# Patient Record
Sex: MALE | Race: WHITE | NOT HISPANIC OR LATINO | Employment: OTHER | URBAN - METROPOLITAN AREA
[De-identification: names, ages, dates, MRNs, and addresses within clinical notes are randomized per-mention and may not be internally consistent; named-entity substitution may affect disease eponyms.]

---

## 2018-04-12 ENCOUNTER — OFFICE VISIT (OUTPATIENT)
Dept: VASCULAR SURGERY | Facility: CLINIC | Age: 83
End: 2018-04-12
Payer: COMMERCIAL

## 2018-04-12 VITALS
TEMPERATURE: 98 F | WEIGHT: 250 LBS | SYSTOLIC BLOOD PRESSURE: 130 MMHG | BODY MASS INDEX: 39.24 KG/M2 | HEIGHT: 67 IN | DIASTOLIC BLOOD PRESSURE: 86 MMHG

## 2018-04-12 DIAGNOSIS — I73.9 PERIPHERAL ARTERIAL DISEASE (HCC): Primary | ICD-10-CM

## 2018-04-12 PROBLEM — G62.9 PERIPHERAL NEUROPATHY: Status: ACTIVE | Noted: 2018-04-12

## 2018-04-12 PROCEDURE — 99213 OFFICE O/P EST LOW 20 MIN: CPT | Performed by: SURGERY

## 2018-04-12 RX ORDER — CILOSTAZOL 100 MG/1
TABLET ORAL
COMMUNITY
Start: 2018-03-12 | End: 2018-06-17 | Stop reason: SDUPTHER

## 2018-04-12 RX ORDER — CHLORAL HYDRATE 500 MG
CAPSULE ORAL
COMMUNITY

## 2018-04-12 RX ORDER — METOPROLOL SUCCINATE 50 MG/1
TABLET, EXTENDED RELEASE ORAL
COMMUNITY

## 2018-04-12 RX ORDER — MULTIVIT-MIN/IRON/FOLIC ACID/K 18-600-40
CAPSULE ORAL
COMMUNITY

## 2018-04-12 RX ORDER — ASPIRIN 325 MG
TABLET ORAL
COMMUNITY
End: 2018-06-20 | Stop reason: CLARIF

## 2018-04-12 RX ORDER — LOSARTAN POTASSIUM 50 MG/1
TABLET ORAL
COMMUNITY
Start: 2018-03-16

## 2018-04-12 RX ORDER — AMLODIPINE BESYLATE 5 MG/1
TABLET ORAL
COMMUNITY
Start: 2018-03-12

## 2018-04-12 RX ORDER — ROSUVASTATIN CALCIUM 10 MG/1
TABLET, COATED ORAL
COMMUNITY

## 2018-04-12 NOTE — ASSESSMENT & PLAN NOTE
Recent onset of inability for dorsiflexion right foot, history of SFA stenosis    Planning lower extremity arterial study in further follow-up

## 2018-04-12 NOTE — PROGRESS NOTES
Assessment/Plan:    Peripheral arterial disease ([facility] )  Recent onset of inability for dorsiflexion right foot, history of SFA stenosis  Planning lower extremity arterial study in further follow-up       Diagnoses and all orders for this visit:    Peripheral arterial disease ([facility] )    Other orders  -     amLODIPine (NORVASC) 5 mg tablet;   -     aspirin 325 mg tablet; Take by mouth  -     cilostazol (PLETAL) 100 mg tablet;   -     rosuvastatin (CRESTOR) 10 MG tablet; Take by mouth  -     losartan (COZAAR) 50 mg tablet;   -     metoprolol succinate (TOPROL XL) 50 mg 24 hr tablet; Take by mouth  -     Cholecalciferol (VITAMIN D) 2000 units CAPS; Take by mouth  -     Cetirizine HCl (ZYRTEC ALLERGY) 10 MG CAPS; Take by mouth  -     Omega-3 1000 MG CAPS; Take by mouth  -     Fluticasone Propionate (FLONASE NA); into each nostril        Subjective:      Patient ID: Aman Kate is a 81 y o  male  HPI recent onset of tripping with his right foot problems with dorsiflexion and a recent fall, no ischemic rest pain or tissue loss    The following portions of the patient's history were reviewed and updated as appropriate: allergies, current medications, past family history, past medical history, past social history, past surgical history and problem list     Review of Systems    Right foot weakness all other systems negative  /86 (BP Location: Right arm, Patient Position: Sitting, Cuff Size: Adult)   Temp 98 °F (36 7 °C) (Tympanic)   Ht 5' 7" (1 702 m)   Wt 113 kg (250 lb) Comment: per pt  BMI 39 16 kg/m²          Physical Exam  bilateral lower extremities with excellent capillary refill, mild weakness in dorsiflexion right foot    No tissue loss at this time    Vitals:    04/12/18 0958   BP: 130/86   BP Location: Right arm   Patient Position: Sitting   Cuff Size: Adult   Temp: 98 °F (36 7 °C)   TempSrc: Tympanic   Weight: 113 kg (250 lb)   Height: 5' 7" (1 702 m)       Patient Active Problem List   Diagnosis  Peripheral neuropathy    Peripheral arterial disease (HCC)       No past surgical history on file  No family history on file  Social History     Social History    Marital status: /Civil Union     Spouse name: N/A    Number of children: N/A    Years of education: N/A     Occupational History    Not on file       Social History Main Topics    Smoking status: Never Smoker    Smokeless tobacco: Never Used    Alcohol use Not on file    Drug use: Unknown    Sexual activity: Not on file     Other Topics Concern    Not on file     Social History Narrative    No narrative on file       Allergies no known allergies      Current Outpatient Prescriptions:     amLODIPine (NORVASC) 5 mg tablet, , Disp: , Rfl:     aspirin 325 mg tablet, Take by mouth, Disp: , Rfl:     Cetirizine HCl (ZYRTEC ALLERGY) 10 MG CAPS, Take by mouth, Disp: , Rfl:     Cholecalciferol (VITAMIN D) 2000 units CAPS, Take by mouth, Disp: , Rfl:     cilostazol (PLETAL) 100 mg tablet, , Disp: , Rfl:     Fluticasone Propionate (FLONASE NA), into each nostril, Disp: , Rfl:     losartan (COZAAR) 50 mg tablet, , Disp: , Rfl:     metoprolol succinate (TOPROL XL) 50 mg 24 hr tablet, Take by mouth, Disp: , Rfl:     Omega-3 1000 MG CAPS, Take by mouth, Disp: , Rfl:     rosuvastatin (CRESTOR) 10 MG tablet, Take by mouth, Disp: , Rfl:

## 2018-04-12 NOTE — PATIENT INSTRUCTIONS
Patient is complaining of inability to dorsiflex his right foot and has tripped and fallen recently  He was in to see Podiatry who has recommended physical therapy for him and a brace as well  He has not had a lower extremity arterial study in over 2 years and I will be ordering that at [facility] for follow-up  This does not seem to be a vascular insufficiency issue while his pulses not palpable he has excellent capillary refill and we will obtain a lower extremity arterial study and see him in follow-up for further evaluation

## 2018-04-23 ENCOUNTER — TELEPHONE (OUTPATIENT)
Dept: PHYSICAL THERAPY | Facility: CLINIC | Age: 83
End: 2018-04-23

## 2018-04-23 ENCOUNTER — EVALUATION (OUTPATIENT)
Dept: PHYSICAL THERAPY | Facility: CLINIC | Age: 83
End: 2018-04-23
Payer: COMMERCIAL

## 2018-04-23 DIAGNOSIS — G89.29 CHRONIC PAIN OF RIGHT ANKLE: ICD-10-CM

## 2018-04-23 DIAGNOSIS — M25.571 CHRONIC PAIN OF RIGHT ANKLE: ICD-10-CM

## 2018-04-23 DIAGNOSIS — R29.818 DIFFICULTY BALANCING: Primary | ICD-10-CM

## 2018-04-23 PROCEDURE — G8979 MOBILITY GOAL STATUS: HCPCS

## 2018-04-23 PROCEDURE — 97163 PT EVAL HIGH COMPLEX 45 MIN: CPT

## 2018-04-23 PROCEDURE — G8978 MOBILITY CURRENT STATUS: HCPCS

## 2018-04-23 PROCEDURE — G8980 MOBILITY D/C STATUS: HCPCS

## 2018-04-23 NOTE — PROGRESS NOTES
PT Evaluation   PT DISCHARGE NOTE: 18 Pt has not returned to PT since intial evaluation due to further diagnostic testing ordered by MD, and PT put on hold DC from PT  Today's date: 2018  Patient name: Shannan Garland  : 1935  MRN: [id number]  Referring provider: Mone Lance  Dx:   Encounter Diagnosis     ICD-10-CM    1  Difficulty balancing R29 818    2  Chronic pain of right ankle M25 571     G89 29        Start Time: 1305  Stop Time: 1401  Total time in clinic (min): 56 minutes    Assessment    Assessment details: 81year old male is at increased falls per multiple standardized tests  Current script is for right ankle pain, but pt states his  Pain from posterior tibial tendon dysfunction has been significantly reduced by use of an ankle brace (Bennett Brace) that limits IV/EV  The pt however is more concerned about his imbalance  He progressed from a SPC to a rolling walker a few months ago due to imbalance  The pt states he has been referred to neurology and he will be seen next month  His goal is to walk with SPC or no Ad  Ankle strengthening will need to be incorporated to PT as he is reporting foot catching right, as long as it does not aggravate his pain, or AFO may need to be considered for safety  Advise continued PT for balance therapy  Updated script will be sought  Understanding of Dx/Px/POC: excellent   Prognosis: good    Goals  STG 2 weeks  1  Pt will be independent with initial hep  2  Independent/safe stand to sit without cues  LTG 4-8 weeks  1  Improve TUG score to 13-15 seconds to meet/approach age norm for community dwelling adults  2  Improve DGI to 20 or higher to improve safety in community   3  Improve gait speed by 0 1 meter/sec or more  4  Complete 6MWT as tolerated  5  Progress to least restrictive AD for community mobility/stairs/curbs/uneven ground         Plan  Planned therapy interventions: home exercise program, gait training, balance, manual therapy, neuromuscular re-education, patient education, therapeutic exercise and therapeutic activities  Frequency: 2x week  Duration in visits: 2  Duration in weeks: 8  Treatment plan discussed with: patient  Plan details: POC expires 18  Subjective Evaluation    History of Present Illness  Mechanism of injury: 81 y o male notes worsening balance over the past 8 years  He presents with script today  For right ankle pain  He has Posterior tibial tendon dysfunction (literature ) but he is very concerned about his balance  Pt arrive with rolling walker which he uses all of the time for the past 6 weeks  He fell a few times when he was using a SPC  Pt denies ever having dizziness  He wears a right foot brace all waking hours  **pt states he will see a neurologist next month due to his imbalance  Pain  Current pain ratin  At best pain ratin  At worst pain ratin  Quality: sharp  Relieving factors: support  Exacerbated by: activity without the brace  Social Support  Steps to enter house: yes  Stairs in house: yes (he lives on one level, has a stair glide  )   Lives in: multiple-level home  Lives with: spouse    Employment status: not working  Treatments  Previous treatment: physical therapy (PT here before, 4 years ago, for balance  )  Patient Goals  Patient goal: Pt wants to walk without AD without loss of balance  Objective     Ambulation     Ambulation: Level Surfaces     Additional Level Surfaces Ambulation Details  Coordination: intact finger to nose  Heel to shin: intact   ANNETTE: reduced performance in Ue and Le  Strength: hip flexion, quads 5/5  Left ankle 5/5 except EV = 4/5  Right ankle: pt wears a Bennett brace, allows df/pf only with plastic lateral uprights  Df=4/5  Pf=5/5  Pt reports difficulty getting up from a standard chair due to weakness  REO:30 sec  REC:5 sec  SLS: 1 sec with supervision  Gait speed= 0 71 m/sec or 2 32 ft/sec with rolling walker  Ruby Castañeda Decreased step height  Pt admits to occasionally catching right foot in gait  DGI:15/24  TU 29s  Oculomotor: SP intact,  Saccades slow and dysmetric  Positionals: deferred  Flowsheet Rows    Flowsheet Row Most Recent Value   PT/OT G-Codes   Current Score  34   Projected Score  61   FOTO information reviewed  Yes   Assessment Type  Evaluation   G code set  Mobility: Walking & Moving Around   Mobility: Walking and Moving Around Current Status ()  CJ   Mobility: Walking and Moving Around Goal Status ()  CL          Precautions:  has a past medical history of Allergic; Arthritis; Coronary artery disease; Obesity; and Visual impairment  He also has no past medical history of Anxiety; Depression; Diabetes mellitus ([address] ); Disease of thyroid gland; Eating disorder; GERD (gastroesophageal reflux disease); Heart murmur; HL (hearing loss); or Stroke [facility])      Daily Treatment Diary

## 2018-04-23 NOTE — TELEPHONE ENCOUNTER
Left message on nurse's station line  Requested new script since script is dated 1/10/18, and requested imbalance be added to the script as pt is very concerned about his balance and pain in ankle is negligible at this time with use of ankle brace  Left [facility] phone and fax #

## 2018-04-23 NOTE — TELEPHONE ENCOUNTER
Erica Benavides called to state Dr Victoriano Garcia would not write an updated script for this pt without seeing the pt and the pt was called and refused to make a follow up appt with physician

## 2018-04-23 NOTE — LETTER
2018    Yohana Lacey    Patient: Karri Christianson   YOB: 1935   Date of Visit: 2018     Encounter Diagnosis     ICD-10-CM    1  Difficulty balancing R29 818    2  Chronic pain of right ankle M25 571     G89 29        Dear Dr Sunshine Oliveira:    Please review the attached Plan of Care from [facility] recent visit  Please verify that you agree therapy should continue by signing the attached document and sending it back to our office  If you have any questions or concerns, please don't hesitate to call  Sincerely,    Diomedes Bingham, PT      Referring Provider:      I certify that I have read the below Plan of Care and certify the need for these services furnished under this plan of treatment while under my care  Elder Katherine  VIA Facsimile: [phone]          PT Evaluation     Today's date: 2018  Patient name: Karri Christianson  : 1935  MRN: [id number]  Referring provider: Marni Lesch  Dx:   Encounter Diagnosis     ICD-10-CM    1  Difficulty balancing R29 818    2  Chronic pain of right ankle M25 571     G89 29        Start Time: 1305  Stop Time: 1401  Total time in clinic (min): 56 minutes    Assessment    Assessment details: 81year old male is at increased falls per multiple standardized tests  Current script is for right ankle pain, but pt states his  Pain from posterior tibial tendon dysfunction has been significantly reduced by use of an ankle brace (Bennett Brace) that limits IV/EV  The pt however is more concerned about his imbalance  He progressed from a SPC to a rolling walker a few months ago due to imbalance  The pt states he has been referred to neurology and he will be seen next month  His goal is to walk with SPC or no Ad  Ankle strengthening will need to be incorporated to PT as he is reporting foot catching right, as long as it does not aggravate his pain, or AFO may need to be considered for safety    Advise continued PT for balance therapy  Updated script will be sought  Understanding of Dx/Px/POC: excellent   Prognosis: good    Goals  STG 2 weeks  1  Pt will be independent with initial hep  2  Independent/safe stand to sit without cues  LTG 4-8 weeks  1  Improve TUG score to 13-15 seconds to meet/approach age norm for community dwelling adults  2  Improve DGI to 20 or higher to improve safety in community   3  Improve gait speed by 0 1 meter/sec or more  4  Complete 6MWT as tolerated  5  Progress to least restrictive AD for community mobility/stairs/curbs/uneven ground  Plan  Planned therapy interventions: home exercise program, gait training, balance, manual therapy, neuromuscular re-education, patient education, therapeutic exercise and therapeutic activities  Frequency: 2x week  Duration in visits: 2  Duration in weeks: 8  Treatment plan discussed with: patient  Plan details: POC expires 18  Subjective Evaluation    History of Present Illness  Mechanism of injury: 81 y o male notes worsening balance over the past 8 years  He presents with script today  For right ankle pain  He has Posterior tibial tendon dysfunction (literature ) but he is very concerned about his balance  Pt arrive with rolling walker which he uses all of the time for the past 6 weeks  He fell a few times when he was using a SPC  Pt denies ever having dizziness  He wears a right foot brace all waking hours  **pt states he will see a neurologist next month due to his imbalance  Pain  Current pain ratin  At best pain ratin  At worst pain ratin  Quality: sharp  Relieving factors: support  Exacerbated by: activity without the brace      Social Support  Steps to enter house: yes  Stairs in house: yes (he lives on one level, has a stair glide  )   Lives in: multiple-level home  Lives with: spouse    Employment status: not working  Treatments  Previous treatment: physical therapy (PT here before, 4 years ago, for balance  )  Patient Goals  Patient goal: Pt wants to walk without AD without loss of balance  Objective     Ambulation     Ambulation: Level Surfaces     Additional Level Surfaces Ambulation Details  Coordination: intact finger to nose  Heel to shin: intact   ANNETTE: reduced performance in Ue and Le  Strength: hip flexion, quads 5/5  Left ankle 5/5 except EV = 4/5  Right ankle: pt wears a Bennett brace, allows df/pf only with plastic lateral uprights  Df=4/5  Pf=5/5  Pt reports difficulty getting up from a standard chair due to weakness  REO:30 sec  REC:5 sec  SLS: 1 sec with supervision  Gait speed= 0 71 m/sec or 2 32 ft/sec with rolling walker     Decreased step height  Pt admits to occasionally catching right foot in gait  DGI:15/24  TU 29s  Oculomotor: SP intact,  Saccades slow and dysmetric  Positionals: deferred  Flowsheet Rows    Flowsheet Row Most Recent Value   PT/OT G-Codes   Current Score  34   Projected Score  61   FOTO information reviewed  Yes   Assessment Type  Evaluation   G code set  Mobility: Walking & Moving Around   Mobility: Walking and Moving Around Current Status ()  CJ   Mobility: Walking and Moving Around Goal Status ()  CL          Precautions:  has a past medical history of Allergic; Arthritis; Coronary artery disease; Obesity; and Visual impairment  He also has no past medical history of Anxiety; Depression; Diabetes mellitus ([address] ); Disease of thyroid gland; Eating disorder; GERD (gastroesophageal reflux disease); Heart murmur; HL (hearing loss); or Stroke [facility])      Daily Treatment Diary

## 2018-04-26 ENCOUNTER — TRANSCRIBE ORDERS (OUTPATIENT)
Dept: PHYSICAL THERAPY | Facility: CLINIC | Age: 83
End: 2018-04-26

## 2018-04-26 DIAGNOSIS — R29.818 DIFFICULTY BALANCING: Primary | ICD-10-CM

## 2018-05-09 ENCOUNTER — TRANSCRIBE ORDERS (OUTPATIENT)
Dept: NEUROSURGERY | Facility: CLINIC | Age: 83
End: 2018-05-09

## 2018-05-09 DIAGNOSIS — G91.2 NORMAL PRESSURE HYDROCEPHALUS (HCC): Primary | ICD-10-CM

## 2018-05-10 ENCOUNTER — DOCUMENTATION (OUTPATIENT)
Dept: NEUROSURGERY | Facility: CLINIC | Age: 83
End: 2018-05-10

## 2018-05-10 ENCOUNTER — TELEPHONE (OUTPATIENT)
Dept: NEUROSURGERY | Facility: CLINIC | Age: 83
End: 2018-05-10

## 2018-05-10 DIAGNOSIS — R26.9 GAIT DISTURBANCE: Primary | ICD-10-CM

## 2018-05-10 NOTE — TELEPHONE ENCOUNTER
Spoke with patient and scheduled the following appts:    Friday, June 1st (SLT):    PT @ 10am, followed by appt with DR @ 11am   Patient is aware that he needs to bring his disc with images on them to this appt  Letter mailed home to patient

## 2018-05-10 NOTE — PROGRESS NOTES
Dear Allyssa Robison,     As discussed, you have been referred for further evaluation through the [facility] ([facility] pressure hydrocephalus) [facility]  The following appointments have been scheduled:       Friday, June 1st ([address] [place] [address])    · PT (physical therapy) gait assessment at 10:00am (arrive at 9:45 am)  · appointment in our office with Dr Dominique Nelson to follow at 11:00 am      Both PT and our office are located on the 2nd floor of the [address] [facility] at [address] [place] [address]; [facility] [address], located at the intersection of [facility] and [facility]  If you use a cane or walker, be sure to bring these assistive devices with you  Please bring your disc with your images on it to the appointment as well  If you have any questions or need to cancel or reschedule, please call [phone]  Thank you           Paty Molina RN  Coordinator, [facility] Fitz Meraz MD  Director, [address] [place] [address]

## 2018-06-01 ENCOUNTER — TELEPHONE (OUTPATIENT)
Dept: NEUROSURGERY | Facility: CLINIC | Age: 83
End: 2018-06-01

## 2018-06-01 ENCOUNTER — OFFICE VISIT (OUTPATIENT)
Dept: PHYSICAL THERAPY | Facility: CLINIC | Age: 83
End: 2018-06-01
Payer: COMMERCIAL

## 2018-06-01 ENCOUNTER — OFFICE VISIT (OUTPATIENT)
Dept: NEUROSURGERY | Facility: CLINIC | Age: 83
End: 2018-06-01
Payer: COMMERCIAL

## 2018-06-01 VITALS
HEIGHT: 67 IN | RESPIRATION RATE: 16 BRPM | BODY MASS INDEX: 33.74 KG/M2 | DIASTOLIC BLOOD PRESSURE: 78 MMHG | SYSTOLIC BLOOD PRESSURE: 142 MMHG | TEMPERATURE: 97.9 F | WEIGHT: 215 LBS

## 2018-06-01 DIAGNOSIS — Z01.818 PRE-PROCEDURAL EXAMINATION: ICD-10-CM

## 2018-06-01 DIAGNOSIS — G91.2 NORMAL PRESSURE HYDROCEPHALUS (HCC): Primary | ICD-10-CM

## 2018-06-01 DIAGNOSIS — R26.0 STAGGERING GAIT: Primary | ICD-10-CM

## 2018-06-01 DIAGNOSIS — G60.9 IDIOPATHIC PERIPHERAL NEUROPATHY: ICD-10-CM

## 2018-06-01 PROCEDURE — 97161 PT EVAL LOW COMPLEX 20 MIN: CPT | Performed by: PHYSICAL THERAPIST

## 2018-06-01 PROCEDURE — G8979 MOBILITY GOAL STATUS: HCPCS | Performed by: PHYSICAL THERAPIST

## 2018-06-01 PROCEDURE — G8978 MOBILITY CURRENT STATUS: HCPCS | Performed by: PHYSICAL THERAPIST

## 2018-06-01 PROCEDURE — 99205 OFFICE O/P NEW HI 60 MIN: CPT | Performed by: NEUROLOGICAL SURGERY

## 2018-06-01 RX ORDER — CHLORAL HYDRATE 500 MG
1000 CAPSULE ORAL 2 TIMES DAILY
COMMUNITY
End: 2021-02-08 | Stop reason: HOSPADM

## 2018-06-01 RX ORDER — CHLORHEXIDINE GLUCONATE 0.12 MG/ML
15 RINSE ORAL EVERY 12 HOURS SCHEDULED
Status: CANCELLED | OUTPATIENT
Start: 2018-06-01

## 2018-06-01 NOTE — LETTER
June 1, 2018     Dionisio Kemp MD  [address] [facility] [address]    Patient: Gladys Gambino   YOB: 1935   Date of Visit: 6/1/2018       Dear Dr Justus Haddad:    Thank you for referring Turner Montalvo to me for evaluation  Below are my notes for this consultation  If you have questions, please do not hesitate to call me  I look forward to following your patient along with you  Sincerely,        Parminder Naranjo MD        CC: Lopez Prince MD  6/1/2018 12:16 PM  Sign at close encounter  Office Note - Neurosurgery   Gladys Gambino 81 y o  male MRN: [id number]      Assessment:    Patient is gradually worsening  Symptoms, as detailed in HPI, continue to significantly impact of patient's quality of life in daily activities  After carefully considering presentation, investigations, functional status and co-morbidities, the risk/benefit profile of surgical intervention is favorable  I reviewed the history, physical examination and imaging in detail today  The clinical, radiographic and investigations support the diagnosis of possible NPH based on 2005 INPH Guidelines  I suspect there is peripheral neuropathy contributing to his gait difficulties  Requirement for right ankle arthrosis is also contributing to balance issues  After discussing the natural history for normal pressure hydrocephalus, the patient would like to proceed with high-volume lumbar puncture with respect to further investigation for normal pressure hydrocephalus  However, he is quite interested in proceeding with a shunt  This patient is a candidate for a right frontal ventriculoperitoneal shunt insertion  The degree of improvement is difficult to estimate and may be related to the extent that other diagnoses are contributing to clinical presentation  There is no alternative treatment for NPH, however overall function may be improved with additional treatment for other diagnoses      The goals of surgery:    1  Gait and balance are most likely to improve  Cognitive changes and incontinence are less likely to improve  The degree of improvement is difficult to predict and may be partial or negligible  The risks of surgery:    1  Risk of general anesthetic, infection and bleeding  2  Risk of neurological injury with new pain, weakness, numbness, speech or vision difficulties  Stroke, hemorrhage or seizures can occur  3  Risk of shunt malfunction or over-drainage, producing subdural hematoma,  requiring additional surgery or reprogramming  4  Risk of injury to thoracic and intraperitoneal structures  Once all questions were answered to their satisfaction, they would like to proceed with high-volume lumbar puncture and would likely proceed with insertion of right frontal  shunt unless no significant improvement is noted following LP  Medical clearance will be required  Thank you for referring your patient to the [address] [place] [address] Normal Pressure Hydrocephalus Program      Expected postoperative course, including activity restrictions, expected pain and postoperative medication were reviewed  The need for possible postoperative rehab placement was discussed  He will likely need to obtain help for his wife at home during his hospitalization  Patient provided verbal consent to surgical procedure and signed consent form: Yes    History, physical examination and diagnostic tests were reviewed and questions answered  Diagnosis, care plan and treatment options were discussed  The patient understand instructions and will follow up as directed      Plan:    Follow-up:  After LP    Problem List Items Addressed This Visit        Nervous and Auditory    Peripheral neuropathy    Relevant Orders    Ambulatory referral to Family Practice    UA (URINE) with reflex to Microscopic    Type and screen    Comprehensive metabolic panel    CBC and differential    APTT    Protime-INR    HEMOGLOBIN A1C W/ EAG ESTIMATION    EKG 12 lead      Other Visit Diagnoses     Normal pressure hydrocephalus    -  Primary    Relevant Orders    Case request operating room: Insertion of right frontal ventriculoperitoneal shunt (Completed)    UA (URINE) with reflex to Microscopic    Type and screen    Comprehensive metabolic panel    CBC and differential    APTT    Protime-INR    HEMOGLOBIN A1C W/ EAG ESTIMATION    EKG 12 lead    Pre-procedural examination        Relevant Orders    UA (URINE) with reflex to Microscopic    Type and screen    Comprehensive metabolic panel    CBC and differential    APTT    Protime-INR    HEMOGLOBIN A1C W/ EAG ESTIMATION    EKG 12 lead          Subjective/Objective     Chief Complaint    Progressive gait difficulties  HPI    81-year-old right-hand-dominant gentleman with a 7 year history of progressive difficulties with gait and balance  He denies any pain, weakness or numbness in his legs  Of note he does wear a right AFO secondary to tendon pain  He feels as if his balance has become slowly worse to the point where he has begun using a walker over the past 2 months  He describes a 6-9 month history of urinary urgency without flaquito incontinence  He denies any side change in cognition  He is the primary caregiver for his wife and is able to look over household finances and chores  He denies any headaches, nausea vomiting or seizure activity  He had 2 instances where he was hospitalized following closed head injury in the past 40 years  He denies any history of meningitis or intracranial hemorrhage  He has been referred to the integrated normal pressure hydrocephalus program for further assessment  ROS    Review of Systems   HENT: Negative  Eyes: Negative  Respiratory: Negative  Cardiovascular: Negative  Gastrointestinal: Negative  Endocrine: Negative  Genitourinary: Positive for urgency  Musculoskeletal: Negative  Skin: Negative      Allergic/Immunologic: Negative  Neurological: Positive for weakness (difficulty with gait and balance  Uses rollator  Wears brace on RLE)  Hematological: Bruises/bleeds easily (fish oil and omega 3)  Family History    Family History   Problem Relation Age of Onset    Hodgkin's lymphoma Father     Cancer Brother        Social History    Social History     Social History    Marital status: /Civil Union     Spouse name: N/A    Number of children: N/A    Years of education: N/A     Occupational History    Not on file       Social History Main Topics    Smoking status: Former Smoker     Packs/day: 6 00     Years: 20 00     Quit date: 4/23/1978    Smokeless tobacco: Never Used    Alcohol use 8 4 oz/week     14 Glasses of wine per week    Drug use: No    Sexual activity: Not on file     Other Topics Concern    Not on file     Social History Narrative    No narrative on file       Past Medical History    Past Medical History:   Diagnosis Date    Allergic     Arthritis     Coronary artery disease     History of heart artery stent 2001    x2    Obesity     Visual impairment        Surgical History    Past Surgical History:   Procedure Laterality Date    CATARACT EXTRACTION, BILATERAL Bilateral 2014    ROTATOR CUFF REPAIR Right 2016       Medications      Current Outpatient Prescriptions:     amLODIPine (NORVASC) 5 mg tablet, , Disp: , Rfl:     aspirin 325 mg tablet, Take by mouth, Disp: , Rfl:     Cetirizine HCl (ZYRTEC ALLERGY) 10 MG CAPS, Take by mouth, Disp: , Rfl:     Cholecalciferol (VITAMIN D) 2000 units CAPS, Take by mouth, Disp: , Rfl:     cilostazol (PLETAL) 100 mg tablet, , Disp: , Rfl:     Fluticasone Propionate (FLONASE NA), into each nostril, Disp: , Rfl:     losartan (COZAAR) 50 mg tablet, , Disp: , Rfl:     metoprolol succinate (TOPROL XL) 50 mg 24 hr tablet, Take by mouth, Disp: , Rfl:     Omega-3 1000 MG CAPS, Take by mouth, Disp: , Rfl:     Omega-3 Fatty Acids (FISH OIL) 1,000 mg, Take 1,000 mg by mouth 2 (two) times a day, Disp: , Rfl:     rosuvastatin (CRESTOR) 10 MG tablet, Take by mouth, Disp: , Rfl:     Allergies    No Known Allergies    The following portions of the patient's history were reviewed and updated as appropriate: allergies, current medications, past family history, past medical history, past social history, past surgical history and problem list     Investigations    I personally reviewed the MRI PT and cognitive assessment results with the patient:    MRI of the brain without contrast dated May 2nd, 2018  Age-appropriate degree of cortical atrophy  White matter signal change consistent with small vessel ischemic disease  Ventricular system is somewhat dilated out of proportion to degree of cortical atrophy  NPH scale dated 6/1/2018, 12/15  PT gait assessment dated 6/1/2018  TUG 26 sec , TUGc 30 sec, DGI 11/24   MoCA dated 6/1/2018, 25/30  Physical Exam    Vitals:  Blood pressure 142/78, temperature 97 9 °F (36 6 °C), resp  rate 16, height 5' 7" (1 702 m), weight 97 5 kg (215 lb)  ,Body mass index is 33 67 kg/m²  Physical Exam   Constitutional: He appears well-developed and well-nourished  Eyes: EOM are normal    Cardiovascular: Normal rate and regular rhythm  Pulmonary/Chest: Effort normal and breath sounds normal    Abdominal: Soft  Bowel sounds are normal    Neurological: He is alert  He has a normal Romberg Test    Reflex Scores:       Patellar reflexes are 0 on the right side and 0 on the left side  Achilles reflexes are 0 on the right side and 0 on the left side  Skin: Skin is warm and dry  Psychiatric: He has a normal mood and affect  His behavior is normal  Judgment and thought content normal      Neurologic Exam     Cranial Nerves     CN III, IV, VI   Extraocular motions are normal      CN V   Facial sensation intact  CN VII   Facial expression full, symmetric       CN XII   CN XII normal      Motor Exam   Right arm pronator drift: absent  Left arm pronator drift: absent5/5 power in lower extremities aside from weakness on right dorsiflexion  Brace is in place  Sensory Exam   Right leg light touch: normal  Left leg light touch: normal  Right leg vibration: decreased from knee  Left leg vibration: decreased from knee  Right leg pinprick: normal  Left leg pinprick: normal    Gait, Coordination, and Reflexes     Gait  Gait: shuffling    Coordination   Romberg: negative    Reflexes   Right patellar: 0  Left patellar: 0  Right achilles: 0  Left achilles: 0  Right Lockwood: absent  Left Lockwood: absent  Right ankle clonus: absent  Left ankle clonus: absentWalks with a shuffling gait with toes pointed outward  Slightly stooped forward posture

## 2018-06-01 NOTE — TELEPHONE ENCOUNTER
Patient is interested in LP  Tentatively scheduled with Nora for June 20th for 9:30am, arrive 8am   Will need to move the noon PT to make room for this patient

## 2018-06-01 NOTE — PROGRESS NOTES
Office Note - Neurosurgery   Virginia Bryson 81 y o  male MRN: [id number]      Assessment:    Patient is gradually worsening  Symptoms, as detailed in HPI, continue to significantly impact of patient's quality of life in daily activities  After carefully considering presentation, investigations, functional status and co-morbidities, the risk/benefit profile of surgical intervention is favorable  I reviewed the history, physical examination and imaging in detail today  The clinical, radiographic and investigations support the diagnosis of possible NPH based on 2005 INPH Guidelines  I suspect there is peripheral neuropathy contributing to his gait difficulties  Requirement for right ankle arthrosis is also contributing to balance issues  After discussing the natural history for normal pressure hydrocephalus, the patient would like to proceed with high-volume lumbar puncture with respect to further investigation for normal pressure hydrocephalus  However, he is quite interested in proceeding with a shunt  This patient is a candidate for a right frontal ventriculoperitoneal shunt insertion  The degree of improvement is difficult to estimate and may be related to the extent that other diagnoses are contributing to clinical presentation  There is no alternative treatment for NPH, however overall function may be improved with additional treatment for other diagnoses  The goals of surgery:    1  Gait and balance are most likely to improve  Cognitive changes and incontinence are less likely to improve  The degree of improvement is difficult to predict and may be partial or negligible  The risks of surgery:    1  Risk of general anesthetic, infection and bleeding  2  Risk of neurological injury with new pain, weakness, numbness, speech or vision difficulties  Stroke, hemorrhage or seizures can occur    3  Risk of shunt malfunction or over-drainage, producing subdural hematoma,  requiring additional surgery or reprogramming  4  Risk of injury to thoracic and intraperitoneal structures  Once all questions were answered to their satisfaction, they would like to proceed with high-volume lumbar puncture and would likely proceed with insertion of right frontal  shunt unless no significant improvement is noted following LP  Medical clearance will be required  Thank you for referring your patient to the [address] [place] [address] Normal Pressure Hydrocephalus Program      Expected postoperative course, including activity restrictions, expected pain and postoperative medication were reviewed  The need for possible postoperative rehab placement was discussed  He will likely need to obtain help for his wife at home during his hospitalization  Patient provided verbal consent to surgical procedure and signed consent form: Yes    History, physical examination and diagnostic tests were reviewed and questions answered  Diagnosis, care plan and treatment options were discussed  The patient understand instructions and will follow up as directed      Plan:    Follow-up:  After LP    Problem List Items Addressed This Visit        Nervous and Auditory    Peripheral neuropathy    Relevant Orders    Ambulatory referral to Family Practice    UA (URINE) with reflex to Microscopic    Type and screen    Comprehensive metabolic panel    CBC and differential    APTT    Protime-INR    HEMOGLOBIN A1C W/ EAG ESTIMATION    EKG 12 lead      Other Visit Diagnoses     Normal pressure hydrocephalus    -  Primary    Relevant Orders    Case request operating room: Insertion of right frontal ventriculoperitoneal shunt (Completed)    UA (URINE) with reflex to Microscopic    Type and screen    Comprehensive metabolic panel    CBC and differential    APTT    Protime-INR    HEMOGLOBIN A1C W/ EAG ESTIMATION    EKG 12 lead    Pre-procedural examination        Relevant Orders    UA (URINE) with reflex to Microscopic    Type and screen Comprehensive metabolic panel    CBC and differential    APTT    Protime-INR    HEMOGLOBIN A1C W/ EAG ESTIMATION    EKG 12 lead          Subjective/Objective     Chief Complaint    Progressive gait difficulties  HPI    73-year-old right-hand-dominant gentleman with a 7 year history of progressive difficulties with gait and balance  He denies any pain, weakness or numbness in his legs  Of note he does wear a right AFO secondary to tendon pain  He feels as if his balance has become slowly worse to the point where he has begun using a walker over the past 2 months  He describes a 6-9 month history of urinary urgency without flaquito incontinence  He denies any side change in cognition  He is the primary caregiver for his wife and is able to look over household finances and chores  He denies any headaches, nausea vomiting or seizure activity  He had 2 instances where he was hospitalized following closed head injury in the past 40 years  He denies any history of meningitis or intracranial hemorrhage  He has been referred to the integrated normal pressure hydrocephalus program for further assessment  ROS    Review of Systems   HENT: Negative  Eyes: Negative  Respiratory: Negative  Cardiovascular: Negative  Gastrointestinal: Negative  Endocrine: Negative  Genitourinary: Positive for urgency  Musculoskeletal: Negative  Skin: Negative  Allergic/Immunologic: Negative  Neurological: Positive for weakness (difficulty with gait and balance  Uses rollator  Wears brace on RLE)  Hematological: Bruises/bleeds easily (fish oil and omega 3)  Family History    Family History   Problem Relation Age of Onset    Hodgkin's lymphoma Father     Cancer Brother        Social History    Social History     Social History    Marital status: /Civil Union     Spouse name: N/A    Number of children: N/A    Years of education: N/A     Occupational History    Not on file       Social History Main Topics    Smoking status: Former Smoker     Packs/day: 6 00     Years: 20 00     Quit date: 4/23/1978    Smokeless tobacco: Never Used    Alcohol use 8 4 oz/week     14 Glasses of wine per week    Drug use: No    Sexual activity: Not on file     Other Topics Concern    Not on file     Social History Narrative    No narrative on file       Past Medical History    Past Medical History:   Diagnosis Date    Allergic     Arthritis     Coronary artery disease     History of heart artery stent 2001    x2    Obesity     Visual impairment        Surgical History    Past Surgical History:   Procedure Laterality Date    CATARACT EXTRACTION, BILATERAL Bilateral 2014    ROTATOR CUFF REPAIR Right 2016       Medications      Current Outpatient Prescriptions:     amLODIPine (NORVASC) 5 mg tablet, , Disp: , Rfl:     aspirin 325 mg tablet, Take by mouth, Disp: , Rfl:     Cetirizine HCl (ZYRTEC ALLERGY) 10 MG CAPS, Take by mouth, Disp: , Rfl:     Cholecalciferol (VITAMIN D) 2000 units CAPS, Take by mouth, Disp: , Rfl:     cilostazol (PLETAL) 100 mg tablet, , Disp: , Rfl:     Fluticasone Propionate (FLONASE NA), into each nostril, Disp: , Rfl:     losartan (COZAAR) 50 mg tablet, , Disp: , Rfl:     metoprolol succinate (TOPROL XL) 50 mg 24 hr tablet, Take by mouth, Disp: , Rfl:     Omega-3 1000 MG CAPS, Take by mouth, Disp: , Rfl:     Omega-3 Fatty Acids (FISH OIL) 1,000 mg, Take 1,000 mg by mouth 2 (two) times a day, Disp: , Rfl:     rosuvastatin (CRESTOR) 10 MG tablet, Take by mouth, Disp: , Rfl:     Allergies    No Known Allergies    The following portions of the patient's history were reviewed and updated as appropriate: allergies, current medications, past family history, past medical history, past social history, past surgical history and problem list     Investigations    I personally reviewed the MRI PT and cognitive assessment results with the patient:    MRI of the brain without contrast dated May 2nd, 2018  Age-appropriate degree of cortical atrophy  White matter signal change consistent with small vessel ischemic disease  Ventricular system is somewhat dilated out of proportion to degree of cortical atrophy  NPH scale dated 6/1/2018, 12/15  PT gait assessment dated 6/1/2018  TUG 26 sec , TUGc 30 sec, DGI 11/24   MoCA dated 6/1/2018, 25/30  Physical Exam    Vitals:  Blood pressure 142/78, temperature 97 9 °F (36 6 °C), resp  rate 16, height 5' 7" (1 702 m), weight 97 5 kg (215 lb)  ,Body mass index is 33 67 kg/m²  Physical Exam   Constitutional: He appears well-developed and well-nourished  Eyes: EOM are normal    Cardiovascular: Normal rate and regular rhythm  Pulmonary/Chest: Effort normal and breath sounds normal    Abdominal: Soft  Bowel sounds are normal    Neurological: He is alert  He has a normal Romberg Test    Reflex Scores:       Patellar reflexes are 0 on the right side and 0 on the left side  Achilles reflexes are 0 on the right side and 0 on the left side  Skin: Skin is warm and dry  Psychiatric: He has a normal mood and affect  His behavior is normal  Judgment and thought content normal      Neurologic Exam     Cranial Nerves     CN III, IV, VI   Extraocular motions are normal      CN V   Facial sensation intact  CN VII   Facial expression full, symmetric  CN XII   CN XII normal      Motor Exam   Right arm pronator drift: absent  Left arm pronator drift: absent5/5 power in lower extremities aside from weakness on right dorsiflexion  Brace is in place       Sensory Exam   Right leg light touch: normal  Left leg light touch: normal  Right leg vibration: decreased from knee  Left leg vibration: decreased from knee  Right leg pinprick: normal  Left leg pinprick: normal    Gait, Coordination, and Reflexes     Gait  Gait: shuffling    Coordination   Romberg: negative    Reflexes   Right patellar: 0  Left patellar: 0  Right achilles: 0  Left achilles: 0  Right Lockwood: absent  Left Lockwood: absent  Right ankle clonus: absent  Left ankle clonus: absentWalks with a shuffling gait with toes pointed outward  Slightly stooped forward posture

## 2018-06-01 NOTE — LETTER
June 1, 2018     Gin Sanchez MD  [address] [address]    Patient: Chencho Garces   YOB: 1935   Date of Visit: 6/1/2018       Dear Dr Raquel Reynoso:    Thank you for referring Kimmieliliam Mcbride to me for evaluation  Below are my notes for this consultation  If you have questions, please do not hesitate to call me  I look forward to following your patient along with you  Sincerely,        Emmanuel Lozano MD        CC: Xiomy Tapia MD  6/1/2018 11:19 AM  Sign at close encounter  Office Note - Neurosurgery   Chencho Garces 81 y o  male MRN: [id number]      Assessment:    {drdiscussionselection:97314}    ***     Expected postoperative course, including activity restrictions, expected pain and postoperative medication were reviewed  Patient provided verbal consent to surgical procedure and signed consent form: {YES/NO/NA - CPSL:65048}    { Patient discussion:48219}    Plan:    Follow-up: {follow up:08651}    {Assess/PlanSmartLinks:52692}    Subjective/Objective     Chief Complaint    ***    HPI    7 yra inc gait problems, decrease, legs ok  Urinary urgency 6-9 mt  Chi 37 ya, no mening, no bleed            ROS    Review of Systems   HENT: Negative  Eyes: Negative  Respiratory: Negative  Cardiovascular: Negative  Gastrointestinal: Negative  Endocrine: Negative  Genitourinary: Positive for urgency  Musculoskeletal: Negative  Skin: Negative  Allergic/Immunologic: Negative  Neurological: Positive for weakness (difficulty with gait and balance  Uses rollator  Wears brace on RLE)  Hematological: Bruises/bleeds easily (fish oil and omega 3)         Family History    Family History   Problem Relation Age of Onset    Hodgkin's lymphoma Father     Cancer Brother        Social History    Social History     Social History    Marital status: /Civil Union     Spouse name: N/A    Number of children: N/A    Years of education: N/A Occupational History    Not on file  Social History Main Topics    Smoking status: Former Smoker     Packs/day: 6 00     Years: 20 00     Quit date: 4/23/1978    Smokeless tobacco: Never Used    Alcohol use 8 4 oz/week     14 Glasses of wine per week    Drug use: No    Sexual activity: Not on file     Other Topics Concern    Not on file     Social History Narrative    No narrative on file       Past Medical History    Past Medical History:   Diagnosis Date    Allergic     Arthritis     Coronary artery disease     Obesity     Visual impairment        Surgical History    Past Surgical History:   Procedure Laterality Date    CATARACT EXTRACTION, BILATERAL Bilateral 2014       Medications      Current Outpatient Prescriptions:     amLODIPine (NORVASC) 5 mg tablet, , Disp: , Rfl:     aspirin 325 mg tablet, Take by mouth, Disp: , Rfl:     Cetirizine HCl (ZYRTEC ALLERGY) 10 MG CAPS, Take by mouth, Disp: , Rfl:     Cholecalciferol (VITAMIN D) 2000 units CAPS, Take by mouth, Disp: , Rfl:     cilostazol (PLETAL) 100 mg tablet, , Disp: , Rfl:     Fluticasone Propionate (FLONASE NA), into each nostril, Disp: , Rfl:     losartan (COZAAR) 50 mg tablet, , Disp: , Rfl:     metoprolol succinate (TOPROL XL) 50 mg 24 hr tablet, Take by mouth, Disp: , Rfl:     Omega-3 1000 MG CAPS, Take by mouth, Disp: , Rfl:     rosuvastatin (CRESTOR) 10 MG tablet, Take by mouth, Disp: , Rfl:     Allergies    No Known Allergies    {Common ambulatory SmartLinks:50990}    Investigations    I personally reviewed the {#:35579} results with the patient:    ***    Physical Exam    Vitals: There were no vitals taken for this visit  ,There is no height or weight on file to calculate BMI      Physical Exam  Neurologic Exam

## 2018-06-01 NOTE — PROGRESS NOTES
PT Evaluation     Today's date: 2018  Patient name: Kalli Gandara  : 1935  MRN: [id number]  Referring provider: Shnatell Hidalgo MD  Dx:   Encounter Diagnosis     ICD-10-CM    1  Staggering gait R26 0                   Assessment    Assessment details: Based on recent history as well as objective findings, pt is considered a moderate fall risk with use of RW  Pt presents with a decreased step length, mary, and small steps during pivot turn  Pt has a safe home set up with a full bed/bath on first floor without using steps  Pt would benefit from skilled PT to improve balance, gait/mobility, and gain more independence due to spouse limitations to assist  Pt is able to receive PT at this location if convenient to pt but no follow up is necessary at this time until future appointment with Dr Paul Pelaez  Goals  STG 4 weeks:  1  Follow up prn  LTG 8 weeks:  1  Follow up prn    Plan  Duration in weeks: 6  Treatment plan discussed with: patient  Plan details: Follow up as needed  Subjective Evaluation    History of Present Illness  Date of onset: 2018  Mechanism of injury: Pt presents today stating that 7 years ago he fell backwards and hit his head  He states over the years he has began to notice his balance was declining in particularly the last 1-2 years he states that he is discouraged with his mobility and has progressed to a walker from [facility] that is being used at all times  He scheduled an appointment with Dr Nicanor Darling, his PCP about a year ago and encouraged to go to PT  He did this with small improvement, then stopped  He went back to his PCP who indicated he should trial PT more and he was not content with this decision and did not follow up on PT  He made an appointment with a neurologist 2 months ago receiving an MRI and CT scan that confirmed the diagnosis of NPH and then referred to Dr Paul Pelaez which he is present to meet with him today   Pt had one fall within the past month while bending forward to sweep the floor  Pt feels unsteady when walking and is worried about a future fall  Pt lives with his wife that is unable to assist when needed due to current health condition  They live in a 2-story home that has 2STE with a railing on both sides  He does not use the full staircase in home due to a full bathroom and bedroom on the first floor  Pt has a chair lift to enter the basement but does not venture to the second floor  Quality of life: good    Pain  No pain reported    Social Support  Steps to enter house: yes  Stairs in house: yes (Use of Chair lift in basement, does not use 2nd floor stairs)   Lives in: multiple-level home  Lives with: spouse    Employment status: not working    Diagnostic Tests  MRI studies: abnormal  CT scan: abnormal (positive NPH)  Treatments  Previous treatment: physical therapy  Patient Goals  Patient goals for therapy: improved balance          Objective     Ambulation     Comments   TU'37" with RW  TUG Co'53" with RW recall until 64  DGI:  with RW  For all other details please refer to NPH form  Flowsheet Rows      Most Recent Value   PT/OT G-Codes   Assessment Type  Evaluation   G code set  Mobility: Walking & Moving Around   Mobility: Walking and Moving Around Current Status ()  CK   Mobility: Walking and Moving Around Goal Status ()  CK          Precautions: Falls, NPH, Neuropathy UE, HTN, Stent x 2

## 2018-06-04 ENCOUNTER — DOCUMENTATION (OUTPATIENT)
Dept: NEUROSURGERY | Facility: CLINIC | Age: 83
End: 2018-06-04

## 2018-06-04 ENCOUNTER — TELEPHONE (OUTPATIENT)
Dept: NEUROSURGERY | Facility: CLINIC | Age: 83
End: 2018-06-04

## 2018-06-04 DIAGNOSIS — R26.9 GAIT DISTURBANCE: Primary | ICD-10-CM

## 2018-06-04 NOTE — PROGRESS NOTES
Dear Allyssa Robison,     As discussed, you have been scheduled for continued evaluation through the NPH (normal pressure hydrocephalus) Center with the following appointments: Wednesday, June 20th ([address] [place] [address])    · LP (lumbar puncture) at 9:30 am (arrive at 8am; hospital entrance to registration desk)  · after you are discharged from the Ambulatory Unit following the lumbar puncture, please report to PT (physical therapy) for 12 noon for your gait assessment (Medical office building)  · appointment in our office with Dr Dominique Nelson to follow at 1pm    Again, both PT and our office are located on the 2nd floor of the Medical Office Building  Nora from radiology will be contacting you about a week prior to review our medications and any instructions prior to the procedure  If you use a cane or walker, be sure to bring these assistive devices with you to your appointments  If you have any questions or need to cancel or reschedule please call me directly at [phone]  Thank you           Paty Molina RN  Coordinator, NPH Fitz Meraz MD  Director, [address] [place] [address]

## 2018-06-04 NOTE — TELEPHONE ENCOUNTER
Spoke with patient and scheduled the following appts: Wednesday, June 20th (SLT):    LP @ 9:30am, arrive 8am    PT @ noon    Appt with DR @ 1pm    Info was emailed to patient and scripts were placed in the system  He is aware that he needs to stop blood thinning medications 1 wk prior

## 2018-06-05 PROBLEM — G91.2 NORMAL PRESSURE HYDROCEPHALUS (HCC): Status: ACTIVE | Noted: 2018-06-05

## 2018-06-13 ENCOUNTER — TELEPHONE (OUTPATIENT)
Dept: RADIOLOGY | Facility: HOSPITAL | Age: 83
End: 2018-06-13

## 2018-06-15 NOTE — PROGRESS NOTES
Patient is tentatively scheduled for surgery on 7/11/18 depending on results of LP  If he wants to go through with surgery, schedule patient's 2 and 4 wk POVs accordingly

## 2018-06-17 DIAGNOSIS — I73.9 CLAUDICATION IN PERIPHERAL VASCULAR DISEASE (HCC): Primary | ICD-10-CM

## 2018-06-18 ENCOUNTER — APPOINTMENT (OUTPATIENT)
Dept: LAB | Facility: HOSPITAL | Age: 83
End: 2018-06-18
Attending: NEUROLOGICAL SURGERY
Payer: COMMERCIAL

## 2018-06-18 ENCOUNTER — TRANSCRIBE ORDERS (OUTPATIENT)
Dept: ADMINISTRATIVE | Facility: HOSPITAL | Age: 83
End: 2018-06-18

## 2018-06-18 DIAGNOSIS — Z01.818 PREOP TESTING: ICD-10-CM

## 2018-06-18 DIAGNOSIS — G91.2 NORMAL PRESSURE HYDROCEPHALUS (HCC): ICD-10-CM

## 2018-06-18 DIAGNOSIS — Z01.818 PREOP TESTING: Primary | ICD-10-CM

## 2018-06-18 DIAGNOSIS — G60.9 IDIOPATHIC PERIPHERAL NEUROPATHY: ICD-10-CM

## 2018-06-18 DIAGNOSIS — Z01.818 PRE-PROCEDURAL EXAMINATION: ICD-10-CM

## 2018-06-18 LAB
ALBUMIN SERPL BCP-MCNC: 3.8 G/DL (ref 3.5–5)
ALP SERPL-CCNC: 76 U/L (ref 46–116)
ALT SERPL W P-5'-P-CCNC: 33 U/L (ref 12–78)
ANION GAP SERPL CALCULATED.3IONS-SCNC: 9 MMOL/L (ref 4–13)
APTT PPP: 28 SECONDS (ref 24–36)
AST SERPL W P-5'-P-CCNC: 26 U/L (ref 5–45)
BACTERIA UR QL AUTO: ABNORMAL /HPF
BASOPHILS # BLD AUTO: 0.09 THOUSANDS/ΜL (ref 0–0.1)
BASOPHILS NFR BLD AUTO: 1 % (ref 0–1)
BILIRUB SERPL-MCNC: 0.5 MG/DL (ref 0.2–1)
BILIRUB UR QL STRIP: NEGATIVE
BUN SERPL-MCNC: 11 MG/DL (ref 5–25)
CALCIUM SERPL-MCNC: 8.8 MG/DL (ref 8.3–10.1)
CHLORIDE SERPL-SCNC: 103 MMOL/L (ref 100–108)
CLARITY UR: CLEAR
CO2 SERPL-SCNC: 26 MMOL/L (ref 21–32)
COLOR UR: YELLOW
CREAT SERPL-MCNC: 1.2 MG/DL (ref 0.6–1.3)
EOSINOPHIL # BLD AUTO: 0.35 THOUSAND/ΜL (ref 0–0.61)
EOSINOPHIL NFR BLD AUTO: 4 % (ref 0–6)
ERYTHROCYTE [DISTWIDTH] IN BLOOD BY AUTOMATED COUNT: 13.1 % (ref 11.6–15.1)
EST. AVERAGE GLUCOSE BLD GHB EST-MCNC: 140 MG/DL
GFR SERPL CREATININE-BSD FRML MDRD: 56 ML/MIN/1.73SQ M
GLUCOSE P FAST SERPL-MCNC: 121 MG/DL (ref 65–99)
GLUCOSE UR STRIP-MCNC: NEGATIVE MG/DL
HBA1C MFR BLD: 6.5 % (ref 4.2–6.3)
HCT VFR BLD AUTO: 44.9 % (ref 36.5–49.3)
HGB BLD-MCNC: 15.1 G/DL (ref 12–17)
HGB UR QL STRIP.AUTO: ABNORMAL
IMM GRANULOCYTES # BLD AUTO: 0.02 THOUSAND/UL (ref 0–0.2)
IMM GRANULOCYTES NFR BLD AUTO: 0 % (ref 0–2)
INR PPP: 0.98 (ref 0.86–1.16)
KETONES UR STRIP-MCNC: NEGATIVE MG/DL
LEUKOCYTE ESTERASE UR QL STRIP: NEGATIVE
LYMPHOCYTES # BLD AUTO: 2.42 THOUSANDS/ΜL (ref 0.6–4.47)
LYMPHOCYTES NFR BLD AUTO: 26 % (ref 14–44)
MCH RBC QN AUTO: 31.5 PG (ref 26.8–34.3)
MCHC RBC AUTO-ENTMCNC: 33.6 G/DL (ref 31.4–37.4)
MCV RBC AUTO: 94 FL (ref 82–98)
MONOCYTES # BLD AUTO: 0.86 THOUSAND/ΜL (ref 0.17–1.22)
MONOCYTES NFR BLD AUTO: 9 % (ref 4–12)
NEUTROPHILS # BLD AUTO: 5.74 THOUSANDS/ΜL (ref 1.85–7.62)
NEUTS SEG NFR BLD AUTO: 60 % (ref 43–75)
NITRITE UR QL STRIP: NEGATIVE
NON-SQ EPI CELLS URNS QL MICRO: ABNORMAL /HPF
NRBC BLD AUTO-RTO: 0 /100 WBCS
PH UR STRIP.AUTO: 6 [PH] (ref 5–9)
PLATELET # BLD AUTO: 270 THOUSANDS/UL (ref 149–390)
PMV BLD AUTO: 9.5 FL (ref 8.9–12.7)
POTASSIUM SERPL-SCNC: 3.9 MMOL/L (ref 3.5–5.3)
PROT SERPL-MCNC: 7.8 G/DL (ref 6.4–8.2)
PROT UR STRIP-MCNC: NEGATIVE MG/DL
PROTHROMBIN TIME: 10.3 SECONDS (ref 9.4–11.7)
RBC # BLD AUTO: 4.8 MILLION/UL (ref 3.88–5.62)
RBC #/AREA URNS AUTO: ABNORMAL /HPF
SODIUM SERPL-SCNC: 138 MMOL/L (ref 136–145)
SP GR UR STRIP.AUTO: 1.01 (ref 1–1.03)
UROBILINOGEN UR QL STRIP.AUTO: 0.2 E.U./DL
WBC # BLD AUTO: 9.48 THOUSAND/UL (ref 4.31–10.16)
WBC #/AREA URNS AUTO: ABNORMAL /HPF

## 2018-06-18 PROCEDURE — 85610 PROTHROMBIN TIME: CPT

## 2018-06-18 PROCEDURE — 80053 COMPREHEN METABOLIC PANEL: CPT

## 2018-06-18 PROCEDURE — 36415 COLL VENOUS BLD VENIPUNCTURE: CPT

## 2018-06-18 PROCEDURE — 86901 BLOOD TYPING SEROLOGIC RH(D): CPT

## 2018-06-18 PROCEDURE — 81001 URINALYSIS AUTO W/SCOPE: CPT | Performed by: NEUROLOGICAL SURGERY

## 2018-06-18 PROCEDURE — 85730 THROMBOPLASTIN TIME PARTIAL: CPT

## 2018-06-18 PROCEDURE — 85025 COMPLETE CBC W/AUTO DIFF WBC: CPT

## 2018-06-18 PROCEDURE — 86900 BLOOD TYPING SEROLOGIC ABO: CPT

## 2018-06-18 PROCEDURE — 86850 RBC ANTIBODY SCREEN: CPT

## 2018-06-18 PROCEDURE — 83036 HEMOGLOBIN GLYCOSYLATED A1C: CPT

## 2018-06-18 RX ORDER — CILOSTAZOL 100 MG/1
TABLET ORAL
Qty: 180 TABLET | Refills: 3 | Status: SHIPPED | OUTPATIENT
Start: 2018-06-18 | End: 2019-05-30 | Stop reason: SDUPTHER

## 2018-06-19 LAB
ABO GROUP BLD: NORMAL
BLD GP AB SCN SERPL QL: NEGATIVE
RH BLD: POSITIVE
SPECIMEN EXPIRATION DATE: NORMAL

## 2018-06-20 ENCOUNTER — OFFICE VISIT (OUTPATIENT)
Dept: NEUROSURGERY | Facility: CLINIC | Age: 83
End: 2018-06-20
Payer: COMMERCIAL

## 2018-06-20 ENCOUNTER — HOSPITAL ENCOUNTER (OUTPATIENT)
Dept: RADIOLOGY | Facility: HOSPITAL | Age: 83
Discharge: HOME | End: 2018-06-20
Attending: NEUROLOGICAL SURGERY

## 2018-06-20 ENCOUNTER — OFFICE VISIT (OUTPATIENT)
Dept: PHYSICAL THERAPY | Facility: CLINIC | Age: 83
End: 2018-06-20
Payer: COMMERCIAL

## 2018-06-20 ENCOUNTER — HOSPITAL ENCOUNTER (OUTPATIENT)
Dept: RADIOLOGY | Facility: HOSPITAL | Age: 83
Discharge: HOME | End: 2018-06-20
Attending: NEUROLOGICAL SURGERY | Admitting: RADIOLOGY
Payer: COMMERCIAL

## 2018-06-20 VITALS
SYSTOLIC BLOOD PRESSURE: 150 MMHG | RESPIRATION RATE: 18 BRPM | HEART RATE: 87 BPM | BODY MASS INDEX: 33.74 KG/M2 | WEIGHT: 215 LBS | HEIGHT: 67 IN | DIASTOLIC BLOOD PRESSURE: 83 MMHG | TEMPERATURE: 97 F | OXYGEN SATURATION: 95 %

## 2018-06-20 DIAGNOSIS — G91.9 HYDROCEPHALUS (HCC): ICD-10-CM

## 2018-06-20 DIAGNOSIS — G91.2 NORMAL PRESSURE HYDROCEPHALUS (HCC): Primary | ICD-10-CM

## 2018-06-20 DIAGNOSIS — R26.0 STAGGERING GAIT: Primary | ICD-10-CM

## 2018-06-20 LAB
APTT PPP: 31 SECONDS (ref 24–36)
INR PPP: 0.95 (ref 0.86–1.17)
PLATELET # BLD AUTO: 259 THOUSANDS/UL (ref 149–390)
PMV BLD AUTO: 9.3 FL (ref 8.9–12.7)
PROTHROMBIN TIME: 12.4 SECONDS (ref 11.8–14.2)

## 2018-06-20 PROCEDURE — G8980 MOBILITY D/C STATUS: HCPCS | Performed by: PHYSICAL THERAPIST

## 2018-06-20 PROCEDURE — 62270 DX LMBR SPI PNXR: CPT

## 2018-06-20 PROCEDURE — G8978 MOBILITY CURRENT STATUS: HCPCS | Performed by: PHYSICAL THERAPIST

## 2018-06-20 PROCEDURE — 97164 PT RE-EVAL EST PLAN CARE: CPT | Performed by: PHYSICAL THERAPIST

## 2018-06-20 PROCEDURE — G8979 MOBILITY GOAL STATUS: HCPCS | Performed by: PHYSICAL THERAPIST

## 2018-06-20 PROCEDURE — 99215 OFFICE O/P EST HI 40 MIN: CPT | Performed by: NEUROLOGICAL SURGERY

## 2018-06-20 PROCEDURE — 85730 THROMBOPLASTIN TIME PARTIAL: CPT | Performed by: RADIOLOGY

## 2018-06-20 PROCEDURE — 85610 PROTHROMBIN TIME: CPT | Performed by: RADIOLOGY

## 2018-06-20 PROCEDURE — 85049 AUTOMATED PLATELET COUNT: CPT | Performed by: RADIOLOGY

## 2018-06-20 RX ORDER — MULTIVITAMIN
1 CAPSULE ORAL DAILY
COMMUNITY

## 2018-06-20 RX ORDER — LIDOCAINE WITH 8.4% SOD BICARB 0.9%(10ML)
5 SYRINGE (ML) INJECTION ONCE
Status: DISCONTINUED | OUTPATIENT
Start: 2018-06-20 | End: 2018-06-20

## 2018-06-20 RX ORDER — LIDOCAINE WITH 8.4% SOD BICARB 0.9%(10ML)
5 SYRINGE (ML) INJECTION ONCE
Status: COMPLETED | OUTPATIENT
Start: 2018-06-20 | End: 2018-06-20

## 2018-06-20 RX ADMIN — LIDOCAINE HYDROCHLORIDE 4 ML: 10 INJECTION, SOLUTION INFILTRATION; PERINEURAL at 09:40

## 2018-06-20 NOTE — TELEPHONE ENCOUNTER
Patient is scheduled for surgery on 7/11/18  He will need 2 and 4 wk POVs scheduled  Contact patient/son to set up appts  Patient prefers "[facility]"

## 2018-06-20 NOTE — PROGRESS NOTES
PT Evaluation     Today's date: 2018  Patient name: Merlinda Saner  : 1935  MRN: [id number]  Referring provider: Homero Chew MD  Dx:   Encounter Diagnosis     ICD-10-CM    1  Staggering gait R26 0                   Assessment  Impairments: abnormal gait and activity intolerance    Assessment details: Based on recent history as well as objective findings, pt is considered a minimal fall risk with use of RW  He does present with improved mobility and speed with ambulating  Pt has a safe home set up with a full bed/bath on first floor without using steps  He will follow up with MD for assessment s/p puncture and for possible shunt placement on   Goals  STG 4 weeks:  1  Follow up prn  LTG 8 weeks:  1  Follow up prn    Plan  Duration in weeks: 6  Treatment plan discussed with: patient  Plan details: Follow up as needed  Subjective Evaluation    History of Present Illness  Date of onset: 2018  Mechanism of injury: Pt presents today s/p lumbar puncture this morning  He states he does have a bit of a headache  He does feel the same while walking  It all started 7 years ago when he fell backwards and hit his head  He states over the years he has noticed a decline  Pt had one fall within the past month while bending forward to sweep the floor  Pt feels unsteady when walking and is worried about a future fall  Pt lives with his wife that is unable to assist when needed due to current health condition  They live in a 2-story home that has 2 BREEZY with a railing on both sides  Pt has a chair lift to enter the basement but does not venture to the second floor      Quality of life: good    Social Support  Steps to enter house: yes  Stairs in house: yes (Use of Chair lift in basement, does not use 2nd floor stairs)   Lives in: multiple-level home  Lives with: spouse (wife walking but mentally impaired)    Employment status: not working    Diagnostic Tests  MRI studies: abnormal  CT scan: abnormal (positive NPH)  Treatments  Previous treatment: physical therapy  Patient Goals  Patient goals for therapy: improved balance          Objective     Ambulation     Comments   TUG: 15'" with RW  TUG Co'53" with RW recall until 73  DGI:  with RW  Stairs: 6 steps step through going up B rail step to coming down  LE strength: WFL  Lockwood's:-   Invertor supinator:-          Precautions: Falls, NPH, Neuropathy UE, HTN, Stent x 2

## 2018-06-20 NOTE — LETTER
June 21, 2018     [address]  [address] [facility] [address]    Patient: Merlinda Saner   YOB: 1935   Date of Visit: 6/20/2018       Dear Dr Juan Daniel Shi: Thank you for referring Evan Recinos to me for evaluation  Below are my notes for this consultation  If you have questions, please do not hesitate to call me  I look forward to following your patient along with you           Sincerely,        Meche Burdick MD        CC: No Recipients

## 2018-06-20 NOTE — PRE-PROCEDURE INSTRUCTIONS
Pre-Surgery Instructions:   Medication Instructions    amLODIPine (NORVASC) 5 mg tablet Instructed patient per Anesthesia Guidelines   aspirin (ECOTRIN) 325 mg EC tablet Patient was instructed by Physician and understands   Cetirizine HCl (ZYRTEC ALLERGY) 10 MG CAPS Instructed patient per Anesthesia Guidelines   Cholecalciferol (VITAMIN D) 2000 units CAPS Patient was instructed by Physician and understands   cilostazol (PLETAL) 100 mg tablet Instructed patient per Anesthesia Guidelines   Fluticasone Propionate (FLONASE NA) Instructed patient per Anesthesia Guidelines   losartan (COZAAR) 50 mg tablet Instructed patient per Anesthesia Guidelines   metoprolol succinate (TOPROL XL) 50 mg 24 hr tablet Instructed patient per Anesthesia Guidelines   Multiple Vitamin (MULTIVITAMIN) capsule Patient was instructed by Physician and understands   Omega-3 1000 MG CAPS Patient was instructed by Physician and understands   psyllium (METAMUCIL) 58 6 % packet Instructed patient per Anesthesia Guidelines   rosuvastatin (CRESTOR) 10 MG tablet Instructed patient per Anesthesia Guidelines  Pre op and bathing instructions reviewed   Pt will get hibiclens

## 2018-06-20 NOTE — PROGRESS NOTES
Office Note - Neurosurgery   Perry Marrero 81 y o  male MRN: [id number]      Assessment:    Patient is stable  Symptoms, as detailed in HPI, continue to significantly impact of patient's quality of life in daily activities  After carefully considering presentation, investigations, functional status and co-morbidities, the risk/benefit profile of surgical intervention is favorable  I reviewed the history, physical examination and imaging in detail today  The clinical, radiographic and investigations support the diagnosis of probable NPH based on 2005 INPH Guidelines  He has had significant objective improvement in gait following high-volume lumbar puncture  This patient is a candidate for a right frontal ventriculoperitoneal shunt insertion  The degree of improvement is difficult to estimate and may be related to the extent that other diagnoses are contributing to clinical presentation  There is no alternative treatment for NPH, however overall function may be improved with additional treatment for other diagnoses  The goals of surgery:    1  Gait and balance are most likely to improve  Cognitive changes and incontinence are less likely to improve  The degree of improvement is difficult to predict and may be partial or negligible  The risks of surgery:    1  Risk of general anesthetic, infection and bleeding  2  Risk of neurological injury with new pain, weakness, numbness, speech or vision difficulties  Stroke, hemorrhage or seizures can occur  3  Risk of shunt malfunction or over-drainage, producing subdural hematoma,  requiring additional surgery or reprogramming  4  Risk of injury to thoracic and intraperitoneal structures  Once all questions were answered to their satisfaction, he asked to proceed with surgery  Medical clearance will be required  He understands that he will be the 2nd patient undergo this procedure at the [facility]      Thank you for referring your patient to the [facility] Normal Pressure Hydrocephalus Program      Expected postoperative course, including activity restrictions, expected pain and postoperative medication were reviewed  Patient provided verbal consent to surgical procedure and signed consent form: Yes    History, physical examination and diagnostic tests were reviewed and questions answered  Diagnosis, care plan and treatment options were discussed  The patient understand instructions and will follow up as directed  Plan:    Follow-up:   Surgery    Problem List Items Addressed This Visit        Nervous and Auditory    Normal pressure hydrocephalus - Primary          Subjective/Objective     Chief Complaint    Progressive difficulties with gait  HPI    Patient presents for follow-up post high-volume lumbar puncture  He currently denies any headache  He has not noticed any significant change in his gait or balance since undergoing the procedure a few hours ago  He has had the opportunity to arrange for care for his wife at home and have family support around the time of his planned surgery in July of 2018  He does have further questions regarding surgery  "41-year-old right-hand-dominant gentleman with a 7 year history of progressive difficulties with gait and balance  He denies any pain, weakness or numbness in his legs  Of note he does wear a right AFO secondary to tendon pain  He feels as if his balance has become slowly worse to the point where he has begun using a walker over the past 2 months  He describes a 6-9 month history of urinary urgency without flaquito incontinence  He denies any side change in cognition  He is the primary caregiver for his wife and is able to look over household finances and chores      He denies any headaches, nausea vomiting or seizure activity  He had 2 instances where he was hospitalized following closed head injury in the past 40 years    He denies any history of meningitis or intracranial hemorrhage  He has been referred to the integrated normal pressure hydrocephalus program for further assessment "          ROS    Review of Systems   HENT: Negative  Eyes: Negative  Respiratory: Negative  Cardiovascular: Negative  Gastrointestinal: Negative  Endocrine: Negative  Genitourinary: Positive for urgency  Musculoskeletal: Negative  Skin: Negative  Allergic/Immunologic: Negative  Neurological: Positive for weakness (b/l leg weakness uses walker)  Hematological: Negative  Psychiatric/Behavioral: Positive for decreased concentration  Family History    Family History   Problem Relation Age of Onset    Hodgkin's lymphoma Father     Cancer Brother        Social History    Social History     Social History    Marital status: /Civil Union     Spouse name: N/A    Number of children: N/A    Years of education: N/A     Occupational History    Not on file       Social History Main Topics    Smoking status: Former Smoker     Packs/day: 6 00     Years: 20 00     Quit date: 4/23/1978    Smokeless tobacco: Never Used    Alcohol use 1 2 oz/week     2 Glasses of wine per week      Comment: 3-4 times per week    Drug use: No    Sexual activity: Not on file     Other Topics Concern    Not on file     Social History Narrative    No narrative on file       Past Medical History    Past Medical History:   Diagnosis Date    Allergic     Arthritis     Coronary artery disease     GERD (gastroesophageal reflux disease)     History of heart artery stent 2001    x2    History of transfusion 1988    Hydrocephalus     Hyperlipidemia     Hypertension     Obesity     Visual impairment        Surgical History    Past Surgical History:   Procedure Laterality Date    CATARACT EXTRACTION, BILATERAL Bilateral 2014    ROTATOR CUFF REPAIR Right 2016       Medications      Current Outpatient Prescriptions:     amLODIPine (NORVASC) 5 mg tablet, , Disp: , Rfl:     aspirin (ECOTRIN) 325 mg EC tablet, Take 325 mg by mouth every 6 (six) hours as needed for mild pain, Disp: , Rfl:     Cetirizine HCl (ZYRTEC ALLERGY) 10 MG CAPS, Take by mouth, Disp: , Rfl:     Cholecalciferol (VITAMIN D) 2000 units CAPS, Take by mouth, Disp: , Rfl:     cilostazol (PLETAL) 100 mg tablet, TAKE 1 TABLET TWICE A DAY, Disp: 180 tablet, Rfl: 3    Fluticasone Propionate (FLONASE NA), into each nostril, Disp: , Rfl:     losartan (COZAAR) 50 mg tablet, , Disp: , Rfl:     metoprolol succinate (TOPROL XL) 50 mg 24 hr tablet, Take by mouth, Disp: , Rfl:     Multiple Vitamin (MULTIVITAMIN) capsule, Take 1 capsule by mouth daily, Disp: , Rfl:     Omega-3 1000 MG CAPS, Take by mouth, Disp: , Rfl:     Omega-3 Fatty Acids (FISH OIL) 1,000 mg, Take 1,000 mg by mouth 2 (two) times a day, Disp: , Rfl:     psyllium (METAMUCIL) 58 6 % packet, Take 1 packet by mouth daily, Disp: , Rfl:     rosuvastatin (CRESTOR) 10 MG tablet, Take by mouth, Disp: , Rfl:     Allergies    No Known Allergies    The following portions of the patient's history were reviewed and updated as appropriate: allergies, current medications, past family history, past medical history, past social history, past surgical history and problem list     Investigations    I personally reviewed the LP, NPH PT and NPH Cognitive results with the patient:    High-volume lumbar puncture dated June 20th, 2018  Reported by Dr Johnathan Peck  Opening pressure 17 cm of water  40 cc of clear CSF removed  NPH scale dated  June 20th, 2018, 12/15  PT gait assessment dated June 20th, 2018  TUG 16 sec , TUGc 19 sec, DGI 22/24   MoCA dated June 20th, 2018, 27/30  Physical Exam    Physical Exam   Constitutional: He is oriented to person, place, and time  He appears well-developed and well-nourished  Cardiovascular: Normal rate, regular rhythm and normal heart sounds  Pulmonary/Chest: Effort normal and breath sounds normal    Abdominal: Soft   Bowel sounds are normal    Neurological: He is alert and oriented to person, place, and time  No cranial nerve deficit  Walks with a shuffling gait using a walker, though somewhat improved compared to prior to lumbar puncture  Skin: Skin is warm and dry  Psychiatric: He has a normal mood and affect  His behavior is normal  Judgment and thought content normal      Neurologic Exam     Mental Status   Oriented to person, place, and time       Motor Exam   Right arm pronator drift: absent  Left arm pronator drift: absent

## 2018-06-22 NOTE — PROGRESS NOTES
Pt called back to say he had a slight headache post high volume lumbar puncture, but took tylenol and is fine  Pt denies any lower back pain or bleeding at site

## 2018-06-26 ENCOUNTER — DOCUMENTATION (OUTPATIENT)
Dept: NEUROSURGERY | Facility: CLINIC | Age: 83
End: 2018-06-26

## 2018-06-26 ENCOUNTER — TRANSCRIBE ORDERS (OUTPATIENT)
Dept: NEUROSURGERY | Facility: CLINIC | Age: 83
End: 2018-06-26

## 2018-06-26 DIAGNOSIS — R26.9 GAIT DISTURBANCE: ICD-10-CM

## 2018-06-26 DIAGNOSIS — G91.2 NPH (NORMAL PRESSURE HYDROCEPHALUS) (HCC): ICD-10-CM

## 2018-06-26 DIAGNOSIS — Z98.2 S/P VP SHUNT: Primary | ICD-10-CM

## 2018-06-26 NOTE — PROGRESS NOTES
Dear Mago Hernandez,     As discussed, it is time for your continued care through the NPH (normal pressure hydrocephalus) Center  The following appointments have been scheduled pending that your surgery date does not change: Wednesday, July 25th ([facility] [address])  · Incision check/staple removal with nurse at 11am      Monday, August 6th ([facility])    · CT scan head at 11:00am (arrive at 10:45amam)  · order enclosed      Thursday, August 9th ([facility])    · PT (physical therapy) gait assessment at 10am  · appointment in our office with Dr Ivan Griffith to follow completion of PT at 11am  · both PT and our office are on the 2nd floor of the Medical Office Building  · if you use a cane or walker, be sure to bring these assistive devices with you      If you need to cancel or reschedule, please call me directly at [phone]  Thank you           Dinah Haines RN  Coordinator, NPH Herminio Amos MD  Director, [address] [place] [address]

## 2018-06-26 NOTE — TELEPHONE ENCOUNTER
Spoke with Jolanta Bahena and scheduled his post op appts (see note in chart)    Confirmed information with Jolanta Bahena over the phone and emailed him the appt information per his request

## 2018-07-10 ENCOUNTER — DOCUMENTATION (OUTPATIENT)
Dept: NEUROSURGERY | Facility: CLINIC | Age: 83
End: 2018-07-10

## 2018-07-10 ENCOUNTER — ANESTHESIA EVENT (INPATIENT)
Dept: PERIOP | Facility: HOSPITAL | Age: 83
DRG: 033 | End: 2018-07-10
Payer: COMMERCIAL

## 2018-07-11 ENCOUNTER — APPOINTMENT (INPATIENT)
Dept: RADIOLOGY | Facility: HOSPITAL | Age: 83
DRG: 033 | End: 2018-07-11
Payer: COMMERCIAL

## 2018-07-11 ENCOUNTER — ANESTHESIA (INPATIENT)
Dept: PERIOP | Facility: HOSPITAL | Age: 83
DRG: 033 | End: 2018-07-11
Payer: COMMERCIAL

## 2018-07-11 ENCOUNTER — HOSPITAL ENCOUNTER (INPATIENT)
Facility: HOSPITAL | Age: 83
LOS: 1 days | Discharge: HOME HEALTH | DRG: 033 | End: 2018-07-12
Attending: NEUROLOGICAL SURGERY | Admitting: NEUROLOGICAL SURGERY
Payer: COMMERCIAL

## 2018-07-11 DIAGNOSIS — G60.9 IDIOPATHIC PERIPHERAL NEUROPATHY: ICD-10-CM

## 2018-07-11 DIAGNOSIS — I73.9 PERIPHERAL ARTERIAL DISEASE (HCC): ICD-10-CM

## 2018-07-11 DIAGNOSIS — G91.2 NORMAL PRESSURE HYDROCEPHALUS (HCC): Primary | ICD-10-CM

## 2018-07-11 PROBLEM — E66.811 CLASS 1 OBESITY IN ADULT: Status: ACTIVE | Noted: 2018-07-11

## 2018-07-11 PROBLEM — E66.9 CLASS 1 OBESITY IN ADULT: Status: ACTIVE | Noted: 2018-07-11

## 2018-07-11 PROBLEM — R26.9 NEUROLOGIC GAIT DYSFUNCTION: Chronic | Status: ACTIVE | Noted: 2018-07-11

## 2018-07-11 PROBLEM — E78.5 HYPERLIPIDEMIA: Status: ACTIVE | Noted: 2018-07-11

## 2018-07-11 PROBLEM — I10 ESSENTIAL HYPERTENSION: Chronic | Status: ACTIVE | Noted: 2018-07-11

## 2018-07-11 LAB
GRAM STN SPEC: NORMAL

## 2018-07-11 PROCEDURE — 87070 CULTURE OTHR SPECIMN AEROBIC: CPT | Performed by: NEUROLOGICAL SURGERY

## 2018-07-11 PROCEDURE — 71046 X-RAY EXAM CHEST 2 VIEWS: CPT

## 2018-07-11 PROCEDURE — 62223 ESTABLISH BRAIN CAVITY SHUNT: CPT | Performed by: PHYSICIAN ASSISTANT

## 2018-07-11 PROCEDURE — G8979 MOBILITY GOAL STATUS: HCPCS

## 2018-07-11 PROCEDURE — G8978 MOBILITY CURRENT STATUS: HCPCS

## 2018-07-11 PROCEDURE — 74018 RADEX ABDOMEN 1 VIEW: CPT

## 2018-07-11 PROCEDURE — 99223 1ST HOSP IP/OBS HIGH 75: CPT | Performed by: INTERNAL MEDICINE

## 2018-07-11 PROCEDURE — 97163 PT EVAL HIGH COMPLEX 45 MIN: CPT

## 2018-07-11 PROCEDURE — 97116 GAIT TRAINING THERAPY: CPT

## 2018-07-11 PROCEDURE — 00160J6 BYPASS CEREBRAL VENTRICLE TO PERITONEAL CAVITY WITH SYNTHETIC SUBSTITUTE, OPEN APPROACH: ICD-10-PCS | Performed by: NEUROLOGICAL SURGERY

## 2018-07-11 PROCEDURE — 70250 X-RAY EXAM OF SKULL: CPT

## 2018-07-11 PROCEDURE — 82948 REAGENT STRIP/BLOOD GLUCOSE: CPT

## 2018-07-11 DEVICE — VALVE CERTAS PLUS PROGRAMMABLE IN-LINE VALVE WITH SIPHONGUARD DEVICE: Type: IMPLANTABLE DEVICE | Site: BRAIN | Status: FUNCTIONAL

## 2018-07-11 DEVICE — BACTISEAL VP CATHETER KIT: Type: IMPLANTABLE DEVICE | Site: BRAIN | Status: FUNCTIONAL

## 2018-07-11 RX ORDER — SODIUM CHLORIDE 9 MG/ML
INJECTION, SOLUTION INTRAVENOUS CONTINUOUS PRN
Status: DISCONTINUED | OUTPATIENT
Start: 2018-07-11 | End: 2018-07-11 | Stop reason: SURG

## 2018-07-11 RX ORDER — PROMETHAZINE HYDROCHLORIDE 25 MG/ML
12.5 INJECTION, SOLUTION INTRAMUSCULAR; INTRAVENOUS ONCE AS NEEDED
Status: DISCONTINUED | OUTPATIENT
Start: 2018-07-11 | End: 2018-07-11 | Stop reason: HOSPADM

## 2018-07-11 RX ORDER — PRAVASTATIN SODIUM 80 MG/1
80 TABLET ORAL
Status: DISCONTINUED | OUTPATIENT
Start: 2018-07-11 | End: 2018-07-12 | Stop reason: HOSPADM

## 2018-07-11 RX ORDER — CILOSTAZOL 100 MG/1
100 TABLET ORAL 2 TIMES DAILY
Status: DISCONTINUED | OUTPATIENT
Start: 2018-07-11 | End: 2018-07-12 | Stop reason: HOSPADM

## 2018-07-11 RX ORDER — AMLODIPINE BESYLATE 5 MG/1
5 TABLET ORAL DAILY
Status: DISCONTINUED | OUTPATIENT
Start: 2018-07-11 | End: 2018-07-12 | Stop reason: HOSPADM

## 2018-07-11 RX ORDER — LIDOCAINE HYDROCHLORIDE 10 MG/ML
0.5 INJECTION, SOLUTION INFILTRATION; PERINEURAL ONCE AS NEEDED
Status: DISCONTINUED | OUTPATIENT
Start: 2018-07-11 | End: 2018-07-11

## 2018-07-11 RX ORDER — FENTANYL CITRATE/PF 50 MCG/ML
25 SYRINGE (ML) INJECTION
Status: DISCONTINUED | OUTPATIENT
Start: 2018-07-11 | End: 2018-07-11 | Stop reason: HOSPADM

## 2018-07-11 RX ORDER — SODIUM CHLORIDE, SODIUM LACTATE, POTASSIUM CHLORIDE, CALCIUM CHLORIDE 600; 310; 30; 20 MG/100ML; MG/100ML; MG/100ML; MG/100ML
50 INJECTION, SOLUTION INTRAVENOUS CONTINUOUS
Status: DISCONTINUED | OUTPATIENT
Start: 2018-07-11 | End: 2018-07-11

## 2018-07-11 RX ORDER — SODIUM CHLORIDE, SODIUM LACTATE, POTASSIUM CHLORIDE, CALCIUM CHLORIDE 600; 310; 30; 20 MG/100ML; MG/100ML; MG/100ML; MG/100ML
100 INJECTION, SOLUTION INTRAVENOUS CONTINUOUS
Status: DISCONTINUED | OUTPATIENT
Start: 2018-07-11 | End: 2018-07-11

## 2018-07-11 RX ORDER — OXYCODONE HYDROCHLORIDE 5 MG/1
5 TABLET ORAL EVERY 6 HOURS PRN
Status: DISCONTINUED | OUTPATIENT
Start: 2018-07-11 | End: 2018-07-12 | Stop reason: HOSPADM

## 2018-07-11 RX ORDER — ONDANSETRON 2 MG/ML
INJECTION INTRAMUSCULAR; INTRAVENOUS AS NEEDED
Status: DISCONTINUED | OUTPATIENT
Start: 2018-07-11 | End: 2018-07-11 | Stop reason: SURG

## 2018-07-11 RX ORDER — METOPROLOL TARTRATE 5 MG/5ML
INJECTION INTRAVENOUS AS NEEDED
Status: DISCONTINUED | OUTPATIENT
Start: 2018-07-11 | End: 2018-07-11 | Stop reason: SURG

## 2018-07-11 RX ORDER — CHLORHEXIDINE GLUCONATE 0.12 MG/ML
15 RINSE ORAL EVERY 12 HOURS SCHEDULED
Status: DISCONTINUED | OUTPATIENT
Start: 2018-07-11 | End: 2018-07-12 | Stop reason: HOSPADM

## 2018-07-11 RX ORDER — DOCUSATE SODIUM 100 MG/1
100 CAPSULE, LIQUID FILLED ORAL 2 TIMES DAILY
Status: DISCONTINUED | OUTPATIENT
Start: 2018-07-11 | End: 2018-07-12 | Stop reason: HOSPADM

## 2018-07-11 RX ORDER — SUCCINYLCHOLINE CHLORIDE 20 MG/ML
INJECTION INTRAMUSCULAR; INTRAVENOUS AS NEEDED
Status: DISCONTINUED | OUTPATIENT
Start: 2018-07-11 | End: 2018-07-11 | Stop reason: SURG

## 2018-07-11 RX ORDER — ACETAMINOPHEN 325 MG/1
975 TABLET ORAL EVERY 8 HOURS SCHEDULED
Status: DISCONTINUED | OUTPATIENT
Start: 2018-07-11 | End: 2018-07-12 | Stop reason: HOSPADM

## 2018-07-11 RX ORDER — VALSARTAN 80 MG/1
40 TABLET ORAL DAILY
Status: DISCONTINUED | OUTPATIENT
Start: 2018-07-11 | End: 2018-07-12 | Stop reason: HOSPADM

## 2018-07-11 RX ORDER — SODIUM CHLORIDE, SODIUM LACTATE, POTASSIUM CHLORIDE, CALCIUM CHLORIDE 600; 310; 30; 20 MG/100ML; MG/100ML; MG/100ML; MG/100ML
125 INJECTION, SOLUTION INTRAVENOUS CONTINUOUS
Status: DISCONTINUED | OUTPATIENT
Start: 2018-07-11 | End: 2018-07-11

## 2018-07-11 RX ORDER — METOPROLOL SUCCINATE 50 MG/1
50 TABLET, EXTENDED RELEASE ORAL DAILY
Status: DISCONTINUED | OUTPATIENT
Start: 2018-07-11 | End: 2018-07-12 | Stop reason: HOSPADM

## 2018-07-11 RX ORDER — EPHEDRINE SULFATE 50 MG/ML
INJECTION, SOLUTION INTRAVENOUS AS NEEDED
Status: DISCONTINUED | OUTPATIENT
Start: 2018-07-11 | End: 2018-07-11 | Stop reason: SURG

## 2018-07-11 RX ORDER — ONDANSETRON 2 MG/ML
4 INJECTION INTRAMUSCULAR; INTRAVENOUS EVERY 6 HOURS PRN
Status: DISCONTINUED | OUTPATIENT
Start: 2018-07-11 | End: 2018-07-12 | Stop reason: HOSPADM

## 2018-07-11 RX ORDER — ONDANSETRON 2 MG/ML
4 INJECTION INTRAMUSCULAR; INTRAVENOUS ONCE AS NEEDED
Status: DISCONTINUED | OUTPATIENT
Start: 2018-07-11 | End: 2018-07-11 | Stop reason: HOSPADM

## 2018-07-11 RX ORDER — SENNOSIDES 8.6 MG
1 TABLET ORAL DAILY
Status: DISCONTINUED | OUTPATIENT
Start: 2018-07-11 | End: 2018-07-12 | Stop reason: HOSPADM

## 2018-07-11 RX ORDER — MELATONIN
1000 DAILY
Status: DISCONTINUED | OUTPATIENT
Start: 2018-07-11 | End: 2018-07-12 | Stop reason: HOSPADM

## 2018-07-11 RX ORDER — FENTANYL CITRATE 50 UG/ML
INJECTION, SOLUTION INTRAMUSCULAR; INTRAVENOUS AS NEEDED
Status: DISCONTINUED | OUTPATIENT
Start: 2018-07-11 | End: 2018-07-11 | Stop reason: SURG

## 2018-07-11 RX ORDER — METOCLOPRAMIDE HYDROCHLORIDE 5 MG/ML
10 INJECTION INTRAMUSCULAR; INTRAVENOUS ONCE AS NEEDED
Status: DISCONTINUED | OUTPATIENT
Start: 2018-07-11 | End: 2018-07-11 | Stop reason: HOSPADM

## 2018-07-11 RX ORDER — GINSENG 100 MG
CAPSULE ORAL AS NEEDED
Status: DISCONTINUED | OUTPATIENT
Start: 2018-07-11 | End: 2018-07-11 | Stop reason: HOSPADM

## 2018-07-11 RX ORDER — SODIUM CHLORIDE, SODIUM LACTATE, POTASSIUM CHLORIDE, CALCIUM CHLORIDE 600; 310; 30; 20 MG/100ML; MG/100ML; MG/100ML; MG/100ML
100 INJECTION, SOLUTION INTRAVENOUS CONTINUOUS
Status: DISCONTINUED | OUTPATIENT
Start: 2018-07-11 | End: 2018-07-12

## 2018-07-11 RX ORDER — PROPOFOL 10 MG/ML
INJECTION, EMULSION INTRAVENOUS AS NEEDED
Status: DISCONTINUED | OUTPATIENT
Start: 2018-07-11 | End: 2018-07-11 | Stop reason: SURG

## 2018-07-11 RX ORDER — GLYCOPYRROLATE 0.2 MG/ML
INJECTION INTRAMUSCULAR; INTRAVENOUS AS NEEDED
Status: DISCONTINUED | OUTPATIENT
Start: 2018-07-11 | End: 2018-07-11 | Stop reason: SURG

## 2018-07-11 RX ORDER — ROCURONIUM BROMIDE 10 MG/ML
INJECTION, SOLUTION INTRAVENOUS AS NEEDED
Status: DISCONTINUED | OUTPATIENT
Start: 2018-07-11 | End: 2018-07-11 | Stop reason: SURG

## 2018-07-11 RX ADMIN — CEFAZOLIN SODIUM 2000 MG: 2 SOLUTION INTRAVENOUS at 15:51

## 2018-07-11 RX ADMIN — CILOSTAZOL 100 MG: 100 TABLET ORAL at 17:59

## 2018-07-11 RX ADMIN — SODIUM CHLORIDE, SODIUM LACTATE, POTASSIUM CHLORIDE, AND CALCIUM CHLORIDE 100 ML/HR: .6; .31; .03; .02 INJECTION, SOLUTION INTRAVENOUS at 23:05

## 2018-07-11 RX ADMIN — PSYLLIUM HUSK 1 PACKET: 3.4 POWDER ORAL at 12:00

## 2018-07-11 RX ADMIN — NEOSTIGMINE METHYLSULFATE 4 MG: 1 INJECTION, SOLUTION INTRAMUSCULAR; INTRAVENOUS; SUBCUTANEOUS at 09:13

## 2018-07-11 RX ADMIN — METOPROLOL TARTRATE 2 MG: 1 INJECTION, SOLUTION INTRAVENOUS at 08:36

## 2018-07-11 RX ADMIN — VALSARTAN 40 MG: 80 TABLET ORAL at 11:32

## 2018-07-11 RX ADMIN — PROPOFOL 150 MG: 10 INJECTION, EMULSION INTRAVENOUS at 08:00

## 2018-07-11 RX ADMIN — ACETAMINOPHEN 975 MG: 325 TABLET, FILM COATED ORAL at 21:03

## 2018-07-11 RX ADMIN — DOCUSATE SODIUM 100 MG: 100 CAPSULE, LIQUID FILLED ORAL at 11:33

## 2018-07-11 RX ADMIN — DEXAMETHASONE SODIUM PHOSPHATE 10 MG: 10 INJECTION INTRAMUSCULAR; INTRAVENOUS at 08:00

## 2018-07-11 RX ADMIN — CHLORHEXIDINE GLUCONATE 15 ML: 1.2 RINSE ORAL at 20:52

## 2018-07-11 RX ADMIN — METOPROLOL SUCCINATE 50 MG: 50 TABLET, EXTENDED RELEASE ORAL at 11:33

## 2018-07-11 RX ADMIN — FENTANYL CITRATE 25 MCG: 50 INJECTION INTRAMUSCULAR; INTRAVENOUS at 08:35

## 2018-07-11 RX ADMIN — CEFAZOLIN SODIUM 2000 MG: 2 SOLUTION INTRAVENOUS at 08:15

## 2018-07-11 RX ADMIN — SUCCINYLCHOLINE CHLORIDE 100 MG: 20 INJECTION, SOLUTION INTRAMUSCULAR; INTRAVENOUS at 08:00

## 2018-07-11 RX ADMIN — SODIUM CHLORIDE: 0.9 INJECTION, SOLUTION INTRAVENOUS at 07:01

## 2018-07-11 RX ADMIN — EPHEDRINE SULFATE 10 MG: 50 INJECTION, SOLUTION INTRAMUSCULAR; INTRAVENOUS; SUBCUTANEOUS at 08:42

## 2018-07-11 RX ADMIN — CHLORHEXIDINE GLUCONATE 15 ML: 1.2 RINSE ORAL at 11:03

## 2018-07-11 RX ADMIN — SODIUM CHLORIDE, SODIUM LACTATE, POTASSIUM CHLORIDE, AND CALCIUM CHLORIDE 100 ML/HR: .6; .31; .03; .02 INJECTION, SOLUTION INTRAVENOUS at 11:15

## 2018-07-11 RX ADMIN — ROCURONIUM BROMIDE 10 MG: 10 INJECTION INTRAVENOUS at 08:33

## 2018-07-11 RX ADMIN — GLYCOPYRROLATE 0.5 MG: 0.2 INJECTION, SOLUTION INTRAMUSCULAR; INTRAVENOUS at 09:13

## 2018-07-11 RX ADMIN — AMLODIPINE BESYLATE 5 MG: 5 TABLET ORAL at 11:32

## 2018-07-11 RX ADMIN — PRAVASTATIN SODIUM 80 MG: 80 TABLET ORAL at 17:59

## 2018-07-11 RX ADMIN — FENTANYL CITRATE 25 MCG: 50 INJECTION INTRAMUSCULAR; INTRAVENOUS at 08:31

## 2018-07-11 RX ADMIN — SENNOSIDES 8.6 MG: 8.6 TABLET, FILM COATED ORAL at 11:33

## 2018-07-11 RX ADMIN — DOCUSATE SODIUM 100 MG: 100 CAPSULE, LIQUID FILLED ORAL at 17:59

## 2018-07-11 RX ADMIN — FENTANYL CITRATE 50 MCG: 50 INJECTION INTRAMUSCULAR; INTRAVENOUS at 08:25

## 2018-07-11 RX ADMIN — ROCURONIUM BROMIDE 40 MG: 10 INJECTION INTRAVENOUS at 08:11

## 2018-07-11 RX ADMIN — CHLORHEXIDINE GLUCONATE 15 ML: 1.2 RINSE ORAL at 07:21

## 2018-07-11 RX ADMIN — CHOLECALCIFEROL TAB 25 MCG (1000 UNIT) 1000 UNITS: 25 TAB at 11:32

## 2018-07-11 RX ADMIN — EPHEDRINE SULFATE 10 MG: 50 INJECTION, SOLUTION INTRAMUSCULAR; INTRAVENOUS; SUBCUTANEOUS at 09:05

## 2018-07-11 RX ADMIN — CEFAZOLIN SODIUM 2000 MG: 2 SOLUTION INTRAVENOUS at 23:48

## 2018-07-11 RX ADMIN — ONDANSETRON 4 MG: 2 INJECTION INTRAMUSCULAR; INTRAVENOUS at 08:00

## 2018-07-11 RX ADMIN — FENTANYL CITRATE 50 MCG: 50 INJECTION INTRAMUSCULAR; INTRAVENOUS at 08:43

## 2018-07-11 RX ADMIN — EPHEDRINE SULFATE 10 MG: 50 INJECTION, SOLUTION INTRAMUSCULAR; INTRAVENOUS; SUBCUTANEOUS at 08:32

## 2018-07-11 RX ADMIN — SODIUM CHLORIDE, SODIUM LACTATE, POTASSIUM CHLORIDE, AND CALCIUM CHLORIDE 125 ML/HR: .6; .31; .03; .02 INJECTION, SOLUTION INTRAVENOUS at 10:26

## 2018-07-11 RX ADMIN — CILOSTAZOL 100 MG: 100 TABLET ORAL at 13:00

## 2018-07-11 RX ADMIN — LIDOCAINE HYDROCHLORIDE 100 MG: 20 INJECTION, SOLUTION INTRAVENOUS at 08:00

## 2018-07-11 RX ADMIN — METOPROLOL TARTRATE 1 MG: 1 INJECTION, SOLUTION INTRAVENOUS at 09:21

## 2018-07-11 RX ADMIN — ACETAMINOPHEN 975 MG: 325 TABLET, FILM COATED ORAL at 13:45

## 2018-07-11 RX ADMIN — METOPROLOL TARTRATE 1 MG: 1 INJECTION, SOLUTION INTRAVENOUS at 09:15

## 2018-07-11 RX ADMIN — FENTANYL CITRATE 50 MCG: 50 INJECTION INTRAMUSCULAR; INTRAVENOUS at 08:00

## 2018-07-11 NOTE — LETTER
July 12, 2018     [facility]    Patient: Karri Christianson   YOB: 1935   Date of Visit: 6/5/2018       Dear Dr Garcia Else:    Thank you for referring Narendra Luciano to me for evaluation  Below are the relevant portions of my H&P  If you have questions, please do not hesitate to call me  I look forward to following [facility] along with you  Sincerely,        No name on file          CC: No Recipients patient's belongings returned

## 2018-07-11 NOTE — PHYSICAL THERAPY NOTE
PHYSICAL THERAPY TREATMENT NOTE    Patient Name: Rubia Byrd  JHZMF'P Date: 7/11/2018 07/11/18 1448   Pain Assessment   Pain Assessment No/denies pain   Restrictions/Precautions   Other Precautions Multiple lines;O2;Fall Risk;Telemetry; Chair Alarm; Bed Alarm   General   Chart Reviewed Yes   Family/Caregiver Present Yes   Cognition   Overall Cognitive Status WFL   Arousal/Participation Alert   Attention Attends with cues to redirect   Orientation Level Oriented X4;Other (Comment)  (pt was identified w/ full name and birth date)   Following Commands Follows one step commands with increased time or repetition   Subjective   Subjective pt agreed to PT intervention  mobility education was provided regarding transfers and mobility w/ roller walker  education was completed via demonstration and verbal instruction  Transfers   Sit to Stand 4  Minimal assistance   Additional items Assist x 1; Increased time required;Verbal cues  (for hand placement)   Stand to Sit 4  Minimal assistance   Additional items Assist x 1;Verbal cues  (for body positioning, hand placement)   Ambulation/Elevation   Gait pattern Decreased foot clearance;Shuffling; Foward flexed   Gait Assistance 4  Minimal assist   Additional items Assist x 1;Verbal cues; Tactile cues  (for walker placement, full step length)   Assistive Device Rolling walker   Distance 60 feet x3 w/ standing rest breaks x 2 to 3 minutes each  pt then ambulated 20 feet x2 to bathroom and back    (standing at urinal pt needed modx1 (retropulsion noted))   Balance   Static Sitting Fair +   Dynamic Sitting Poor +   Static Standing Fair -   Dynamic Standing Poor +   Ambulatory Poor +  (w/ roller walker)   Activity Tolerance   Activity Tolerance Patient limited by fatigue   Nurse Made Aware spoke to [address] [facility]   Assessment   Prognosis Good   Problem List Decreased strength;Decreased endurance; Impaired balance;Decreased mobility; Decreased coordination;Decreased safety awareness   Assessment Therapist provided education on mobility technique including transfers and use of roller walker to improve mobility safety  Repetition was necessary for carryover of education received  Pt was noted to have improvement after education w/ decreased assist to maintain mobility safety and increased ambulation distance  Pt continues to need min assist and cuing for technique/safety  Pt continues to be at risk for falls  continued inpatient PT tx is indicated to reduce fall risk  Goals   Patient Goals go home, walk with a walker   STG Expiration Date 07/21/18   Treatment Day 1   Plan   Treatment/Interventions Functional transfer training;LE strengthening/ROM; Elevations; Therapeutic exercise; Endurance training;Patient/family training;Equipment eval/education; Bed mobility;Gait training   Progress Progressing toward goals   PT Frequency Twice a day  (7 days per week)   Recommendation   Recommendation Post acute IP rehab; Other (Comment)  (pt and family refused  pt needs 24 hour assist and home PT)   Equipment Recommended Other (Comment)  (roller walker)     Skilled inpatient PT recommended while in hospital to progress pt toward treatment goals      Emma Fallon, PT

## 2018-07-11 NOTE — ANESTHESIA PREPROCEDURE EVALUATION
Review of Systems/Medical History  Patient summary reviewed  Chart reviewed  No history of anesthetic complications     Cardiovascular  Exercise tolerance (METS): <4,  Hyperlipidemia, Hypertension on > 1 medication, CAD , Cardiac stents     Pulmonary  Smoker ex-smoker  Cumulative Pack Years: 120,        GI/Hepatic    GERD ,        Negative  ROS        Endo/Other    Obesity    GYN  Negative gynecology ROS          Hematology  Negative hematology ROS      Musculoskeletal    Arthritis     Neurology      Comment: Normal pressure hydrocephalus Psychology   Negative psychology ROS              Physical Exam    Airway    Mallampati score: II  TM Distance: >3 FB  Neck ROM: full     Dental   upper dentures and lower dentures,     Cardiovascular      Pulmonary      Other Findings        Anesthesia Plan  ASA Score- 3     Anesthesia Type- general with ASA Monitors  Additional Monitors:   Airway Plan: ETT  Plan Factors- Patient instructed to abstain from smoking on day of procedure  Patient did not smoke on day of surgery  Induction- intravenous  Postoperative Plan- Plan for postoperative opioid use  Planned trial extubation    Informed Consent- Anesthetic plan and risks discussed with patient  I personally reviewed this patient with the CRNA  Discussed and agreed on the Anesthesia Plan with the CRNA  Ora Davidson

## 2018-07-11 NOTE — ANESTHESIA POSTPROCEDURE EVALUATION
Post-Op Assessment Note      CV Status:  Stable    Mental Status:  Lethargic    Hydration Status:  Euvolemic    PONV Controlled:  Controlled    Airway Patency:  Patent and adequate    Post Op Vitals Reviewed: Yes          Staff: CRNA       Comments: oral airway intact    RN aware          BP  135/78   Temp   98 2   Pulse  80   Resp      SpO2   97%

## 2018-07-11 NOTE — PLAN OF CARE
Problem: Potential for Falls  Goal: Patient will remain free of falls  INTERVENTIONS:  - Assess patient frequently for physical needs  -  Identify cognitive and physical deficits and behaviors that affect risk of falls    -  [place] fall precautions as indicated by assessment   - Educate patient/family on patient safety including physical limitations  - Instruct patient to call for assistance with activity based on assessment  - Modify environment to reduce risk of injury  - Consider OT/PT consult to assist with strengthening/mobility   Outcome: Progressing      Problem: Prexisting or High Potential for Compromised Skin Integrity  Goal: Skin integrity is maintained or improved  INTERVENTIONS:  - Identify patients at risk for skin breakdown  - Assess and monitor skin integrity  - Assess and monitor nutrition and hydration status  - Monitor labs (i e  albumin)  - Assess for incontinence   - Turn and reposition patient  - Assist with mobility/ambulation  - Relieve pressure over bony prominences  - Avoid friction and shearing  - Provide appropriate hygiene as needed including keeping skin clean and dry  - Evaluate need for skin moisturizer/barrier cream  - Collaborate with interdisciplinary team (i e  Nutrition, Rehabilitation, etc )   - Patient/family teaching  Outcome: Progressing      Problem: PAIN - ADULT  Goal: Verbalizes/displays adequate comfort level or baseline comfort level  Interventions:  - Encourage patient to monitor pain and request assistance  - Assess pain using appropriate pain scale  - Administer analgesics based on type and severity of pain and evaluate response  - Implement non-pharmacological measures as appropriate and evaluate response  - Consider cultural and social influences on pain and pain management  - Notify physician/advanced practitioner if interventions unsuccessful or patient reports new pain  Outcome: Progressing      Problem: INFECTION - ADULT  Goal: Absence or prevention of progression during hospitalization  INTERVENTIONS:  - Assess and monitor for signs and symptoms of infection  - Monitor lab/diagnostic results  - Monitor all insertion sites, i e  indwelling lines, tubes, and drains  - Monitor endotracheal (as able) and nasal secretions for changes in amount and color  - [place] appropriate cooling/warming therapies per order  - Administer medications as ordered  - Instruct and encourage patient and family to use good hand hygiene technique  - Identify and instruct in appropriate isolation precautions for identified infection/condition  Outcome: Progressing    Goal: Absence of fever/infection during neutropenic period  INTERVENTIONS:  - Monitor WBC  - Implement neutropenic guidelines  Outcome: Progressing      Problem: SAFETY ADULT  Goal: Maintain or return to baseline ADL function  INTERVENTIONS:  -  Assess patient's ability to carry out ADLs; assess patient's baseline for ADL function and identify physical deficits which impact ability to perform ADLs (bathing, care of mouth/teeth, toileting, grooming, dressing, etc )  - Assess/evaluate cause of self-care deficits   - Assess range of motion  - Assess patient's mobility; develop plan if impaired  - Assess patient's need for assistive devices and provide as appropriate  - Encourage maximum independence but intervene and supervise when necessary  ¯ Involve family in performance of ADLs  ¯ Assess for home care needs following discharge   ¯ Request OT consult to assist with ADL evaluation and planning for discharge  ¯ Provide patient education as appropriate  Outcome: Progressing    Goal: Maintain or return mobility status to optimal level  INTERVENTIONS:  - Assess patient's baseline mobility status (ambulation, transfers, stairs, etc )    - Identify cognitive and physical deficits and behaviors that affect mobility  - Identify mobility aids required to assist with transfers and/or ambulation (gait belt, sit-to-stand, lift, walker, cane, etc )  - [place] fall precautions as indicated by assessment  - Record patient progress and toleration of activity level on Mobility SBAR; progress patient to next Phase/Stage  - Instruct patient to call for assistance with activity based on assessment  - Request Rehabilitation consult to assist with strengthening/weightbearing, etc   Outcome: Progressing      Problem: Knowledge Deficit  Goal: Patient/family/caregiver demonstrates understanding of disease process, treatment plan, medications, and discharge instructions  Complete learning assessment and assess knowledge base    Interventions:  - Provide teaching at level of understanding  - Provide teaching via preferred learning methods  Outcome: Progressing

## 2018-07-11 NOTE — PHYSICAL THERAPY NOTE
PHYSICAL THERAPY EVALUATION NOTE    Patient Name: Lance Martinez  UATBL'H Date: 7/11/2018  AGE:   81 y o  Mrn:   [id number]  ADMIT DX:  Normal pressure hydrocephalus [G91 2]    Past Medical History:   Diagnosis Date    Allergic     Arthritis     Coronary artery disease     GERD (gastroesophageal reflux disease)     History of heart artery stent 2001    x2    History of transfusion 1988    Hydrocephalus     Hyperlipidemia     Hypertension     Obesity     Visual impairment      Length Of Stay: 0  PHYSICAL THERAPY EVALUATION :    07/11/18 1422   Pain Assessment   Pain Assessment No/denies pain   Home Living   Type of Home House   Home Layout Multi-level; Able to live on main level with bedroom/bathroom; Other (Comment)  (2 BREEZY w/ railing through garage)   Additional Comments lives w/ spouse  ambulates w/ roller walker  owns cane and quad cane  independent w/ ADLs and driving  2 falls in last 6 months  pt performs all household tasks  family is planning to take time off to stay w/ pt following discharge from hospital  pt and daughter state having no interest in inpatient rehab and want to go home following discharge from hospital    Prior Function   Comments pt seen sitting in chair w/ daughter present  agreed to PT eval  denied pain or dizziness  states feeling "unsteady "    Restrictions/Precautions   Other Precautions Multiple lines;O2;Fall Risk;Telemetry; Chair Alarm; Bed Alarm   General   Additional Pertinent History 2L oxygen via nasal cannula  resting pulse ox 95% and 78 BPM, active 93% and 95 BPM  seated blood pressure 125/79     Family/Caregiver Present Yes   Cognition   Overall Cognitive Status WFL   Arousal/Participation Alert   Orientation Level Oriented X4;Other (Comment)  (pt was identified w/ full name and birth date)   Following Commands Follows one step commands with increased time or repetition   RUE Assessment RUE Assessment WFL  (3+/5, shoulder 3/5)   LUE Assessment   LUE Assessment WFL  (3+/5, shoulder 3/5)   RLE Assessment   RLE Assessment WFL  (3+/5)   LLE Assessment   LLE Assessment WFL  (3+/5)   Coordination   Movements are Fluid and Coordinated 0   Coordination and Movement Description impaired coordination all extremities   Light Touch   RLE Light Touch Grossly intact   LLE Light Touch Grossly intact   Transfers   Sit to Stand 3  Moderate assistance   Additional items Assist x 1; Increased time required;Verbal cues  (for hand placement)   Stand to Sit 4  Minimal assistance   Additional items Assist x 1;Verbal cues  (for body positioning, hand placement)   Additional Comments pt was unable to fully complete transition to 6 inch step  pt completed bilateral step taps to 6 inch step w/ roller walker support and modx1  steps taps were completed twice bilaterally  additional not possible due to fatigue  Ambulation/Elevation   Gait pattern Decreased foot clearance;Shuffling; Inconsistent mary; Foward flexed   Gait Assistance 3  Moderate assist   Additional items Assist x 1;Verbal cues; Tactile cues  (for walker placement, posture)   Assistive Device Rolling walker   Distance 30 feet  (additional not possible due to fatigue)   Stair Management Assistance (see comment above)   Balance   Static Sitting Fair +   Dynamic Sitting Poor +   Static Standing Poor +   Dynamic Standing Poor +   Ambulatory Poor  (w/ roller walker)   Activity Tolerance   Activity Tolerance Patient limited by fatigue   Nurse Made Aware spoke to [facility]   Assessment   Prognosis Good   Problem List Decreased strength;Decreased endurance; Impaired balance;Decreased mobility; Decreased coordination;Decreased safety awareness   Assessment Pt c/o gait and balance issues  Ambulated w/ walker recently  Dx: NPH  7/11/18 insertion right frontal  shunt   order placed for PT eval and tx, w/ activity order of ambulate by 15:00 and seizure precautions  pt presents w/ comorbidities of right rotator cuff repair, bilateral cataract extraction, CAD, DM, dyslipidemia, ataxia, and HTN and personal factors of ambulating w/ assistive device, stairs to enter home, positive fall history and advanced age  pt presents w/ weakness, decreased endurance, impaired balance, gait deviations, impaired coordination, decreased safety awareness and fall risk  these impairments are evident in findings from physical examination (weakness and impaired coordination), mobility assessment (need for min to mod assist w/ all phases of mobility when usually mobilizing independently, tolerance to only 30 feet of ambulation and need for cueing for mobility technique), and Barthel Index: 55/100  pt needed input for task focus and mobility technique  pt is at risk for falls due to physical and safety awareness deficits  pt's clinical presentation is unstable/unpredictable (evident in need for assist w/ all phases of mobility when usually mobilizing independently, tolerance to only 30 feet of ambulation, need for supplemental oxygen in order to maintain oxygen saturation and need for input for mobility technique/safety)  pt needs inpatient PT tx to improve mobility deficits  discharge recommendation is for IP rehab to reduce fall risk and maximize level of functional independence  Pt and family state not wanting inpatient rehab instead wanting to go home  Pt will need 24 hour assist and home PT  Goals   Patient Goals go home, walk with a walker   STG Expiration Date 07/21/18   Short Term Goal #1 pt will:  Increase bilateral LE strength 1 grade to facilitate independent mobility, Perform all bed mobility tasks independently to decrease fall risk factors, Perform all transfers independently to improve independence, Ambulate 250 ft  with roller walker independently w/o LOB, Navigate 2 stairs independently and w/ supervision with unilateral handrail to facilitate return to previous living environment, Increase all balance 1 grade to decrease risk for falls, Complete exercise program independently, Tolerate 3 hr OOB to faciliate upright tolerance and Improve Barthel Index score to 85 or greater to facilitate independence   Plan   Treatment/Interventions Functional transfer training;LE strengthening/ROM; Elevations; Therapeutic exercise; Endurance training;Patient/family training;Equipment eval/education; Bed mobility;Gait training   PT Frequency Twice a day  (7 days per week)   Recommendation   Recommendation Post acute IP rehab  (pt and family refused  pt needs 24 hour assist and home PT)   Equipment Recommended Other (Comment)  (roller walker)   Barthel Index   Feeding 10   Bathing 0   Grooming Score 5   Dressing Score 5   Bladder Score 10   Bowels Score 10   Toilet Use Score 5   Transfers (Bed/Chair) Score 10   Mobility (Level Surface) Score 0   Stairs Score 0   Barthel Index Score 55     Skilled PT recommended while in hospital and upon DC to progress pt toward treatment goals       Rehana Patel, PT

## 2018-07-11 NOTE — CONSULTS
[address] 81 y o  male MRN: [id number]  Unit/Bed#:  Encounter: [id number]    Physician Requesting Consult: Tila Horowitz MD    Reason for Consultation / Chief Complaint:   Status Post Right Frontal  Shunt    /   "I am good"    History of Present Illness:  Kings Padgett is a 81 y o  male who presented to [address] [place] [address] for elective placement of Right frontal  shunt secondary to normal pressure hydrocephalus  After chart review, noted patient with a 7 year history of progressive gait and balance difficulties  Patient over the past several months has been using a walker to ambulate which has prompted placement of the  shunt  Additional PMH includes Obesity, HTN, HLD, CAD s/p Stent placement and arthritis  Patient presented to the ICU post operatively and is without any complaints at this time  Patient reports that he feels well and is looking forward to working with physical therapy  Patient denies headache, dizziness, visual changes, slurred or speech difficulties, no pain, no neck discomfort, no Chest pain or shortness of breath, no abdominal pain, nausea or vomiting  [facility] consulted to follow along on patients ICU admission    History obtained from chart review and the patient      Past Medical History:  Past Medical History:   Diagnosis Date    Allergic     Arthritis     Coronary artery disease     GERD (gastroesophageal reflux disease)     History of heart artery stent 2001    x2    History of transfusion 1988    Hydrocephalus     Hyperlipidemia     Hypertension     Obesity     Visual impairment        Past Surgical History:  Past Surgical History:   Procedure Laterality Date    CATARACT EXTRACTION, BILATERAL Bilateral 2014    ROTATOR CUFF REPAIR Right 2016       Past Family History:  Family History   Problem Relation Age of Onset    Hodgkin's lymphoma Father     Cancer Brother        Social History:  History   Smoking Status    Former Smoker    Packs/day: 6 00    Years: 20 00    Quit date: 4/23/1978   Smokeless Tobacco    Never Used     History   Alcohol Use    1 2 oz/week    2 Glasses of wine per week     Comment: 3-4 times per week     History   Drug Use No     Marital Status: /Civil Union  Exercise History: "very active, but with walker"    Home Medications:   Prior to Admission medications    Medication Sig Start Date End Date Taking?  Authorizing Provider   amLODIPine (NORVASC) 5 mg tablet  3/12/18  Yes Historical Provider, MD   aspirin (ECOTRIN) 325 mg EC tablet Take 325 mg by mouth every 6 (six) hours as needed for mild pain   Yes Historical Provider, MD   Cetirizine HCl (ZYRTEC ALLERGY) 10 MG CAPS Take by mouth   Yes Historical Provider, MD   Cholecalciferol (VITAMIN D) 2000 units CAPS Take by mouth   Yes Historical Provider, MD   cilostazol (PLETAL) 100 mg tablet TAKE 1 TABLET TWICE A DAY 6/18/18  Yes Jolene Gracia MD   Fluticasone Propionate (FLONASE NA) into each nostril   Yes Historical Provider, MD   losartan (COZAAR) 50 mg tablet  3/16/18  Yes Historical Provider, MD   metoprolol succinate (TOPROL XL) 50 mg 24 hr tablet Take by mouth   Yes Historical Provider, MD   Multiple Vitamin (MULTIVITAMIN) capsule Take 1 capsule by mouth daily   Yes Historical Provider, MD   [place]-3 1000 MG CAPS Take by mouth   Yes Historical Provider, MD   Omega-3 Fatty Acids (FISH OIL) 1,000 mg Take 1,000 mg by mouth 2 (two) times a day   Yes Historical Provider, MD   psyllium (METAMUCIL) 58 6 % packet Take 1 packet by mouth daily   Yes Historical Provider, MD   rosuvastatin (CRESTOR) 10 MG tablet Take by mouth   Yes Historical Provider, MD       Inpatient Medications:  Scheduled Meds:  Current Facility-Administered Medications:  acetaminophen 975 mg Oral Q8H Kailee Patiño MD    amLODIPine 5 mg Oral Daily Chantel Gillespie MD    cefazolin 2,000 mg Intravenous Burgess Summer MD    chlorhexidine 15 mL Brandie Solorzano MD cholecalciferol 1,000 Units Oral Daily Roxy Hammer MD    cilostazol 100 mg Oral BID Roxy Hammer MD    docusate sodium 100 mg Oral BID Roxy Hammer MD    lactated ringers 50 mL/hr Intravenous Continuous Sinan Hernandez MD Last Rate: Stopped (07/11/18 1040)   lactated ringers 125 mL/hr Intravenous Continuous Amy Pichardo MD Last Rate: Stopped (07/11/18 1115)   lactated ringers 100 mL/hr Intravenous Continuous Amy Pichardo MD Last Rate: 100 mL/hr (07/11/18 1115)   lactated ringers 100 mL/hr Intravenous Continuous Roxy Hammer MD    metoprolol succinate 50 mg Oral Daily Roxy Hammer MD    ondansetron 4 mg Intravenous Q6H PRN Roxy Hammer MD    oxyCODONE 5 mg Oral Q6H PRN Roxy Hammer MD    pravastatin 80 mg Oral Daily With Juan Mendez MD    psyllium 1 packet Oral Daily Roxy Hammer MD    senna 1 tablet Oral Daily Roxy Hammer MD    valsartan 40 mg Oral Daily Roxy Hammer MD      Continuous Infusions:  lactated ringers 50 mL/hr Last Rate: Stopped (07/11/18 1040)   lactated ringers 125 mL/hr Last Rate: Stopped (07/11/18 1115)   lactated ringers 100 mL/hr Last Rate: 100 mL/hr (07/11/18 1115)   lactated ringers 100 mL/hr      PRN Meds:    ondansetron 4 mg Q6H PRN   oxyCODONE 5 mg Q6H PRN       Allergies:  No Known Allergies    ROS:   Review of Systems   Constitutional: Negative for appetite change, chills, diaphoresis, fatigue and fever  HENT: Negative  Eyes: Negative for visual disturbance  Respiratory: Negative for cough, shortness of breath and wheezing  Cardiovascular: Negative for chest pain, palpitations and leg swelling  Gastrointestinal: Negative for abdominal pain, nausea and vomiting  Endocrine: Negative  Genitourinary: Negative for dysuria  Musculoskeletal: Positive for gait problem  Negative for arthralgias, back pain, joint swelling, myalgias, neck pain and neck stiffness  Skin: Negative  Allergic/Immunologic: Negative      Neurological: Negative for dizziness, tremors, seizures, syncope, facial asymmetry, speech difficulty, weakness, light-headedness, numbness and headaches  Hematological: Negative  Psychiatric/Behavioral: Negative  Vitals:  Vitals:    18 1040 18 1100 18 1130 18 1215   BP: 120/75 132/65 139/80 124/80   Pulse: 76 73 82 81   Resp: 15 (!) 24 (!) 23 22   Temp: (!) 97 1 °F (36 2 °C)      TempSrc: Oral      SpO2: 95% 93% 94% 93%   Weight: 94 9 kg (209 lb 3 5 oz)      Height: 5' 7" (1 702 m)        Temperature:   Temp (24hrs), Av 8 °F (36 6 °C), Min:97 °F (36 1 °C), Max:98 8 °F (37 1 °C)    Current Temperature: (!) 97 1 °F (36 2 °C)    Weights:   IBW: 66 1 kg  Body mass index is 32 77 kg/m²  Hemodynamic Monitoring:  N/A     Non-Invasive/Invasive Ventilation Settings:  Respiratory    Lab Data (Last 4 hours)    None         O2/Vent Data (Last 4 hours)    None              No results found for: PHART, PIW7HFP, PO2ART, NXU7RSD, R0LQITCH, BEART, SOURCE  SpO2: SpO2: 93 %     Physical Exam:  Physical Exam     General: 82 y/o M currently in chair in room 222 appearing non-toxic, awake, alert, and oriented in no acute distress  HEENT: Normocephalic, atraumatic  Noted shunt about R side of head with intact staples, and bandages, no active drainage  PERRL, EOMI, sclera anicteric, conjunctiva pink, oropharynx patent, mucous membranes moist, tongue midline on protrusion  Neck: Supple, Trachea midline  Heart: RRR without murmur, rub, or gallop  Lungs: clear and equal all fields bilaterally, no wheezing, rales, or rhonchi  Abd: Soft, non-tender, no guarding, rebound, or peritoneal signs, active BS noted  Back: no CVA tenderness  Neuro: GCS 15, conscious, alert, and oriented  Non-focal exam    Labs:  No Labs obtained today    Imaging: No new imaging I have personally reviewed pertinent reports  EKG: Telemetry monitor reviewed without noted acute events, currently NSR without ectopy  This was personally reviewed by myself  Micro:   No results found for: Manuel Kraus    ______________________________________________________________________    Assessment and Plan:     1  Normal pressure Hydrocephalus s/p R Frontal  shunt placement  2  Gait and Balance dysfunction  3  Obesity  4  Hypertension  5  Hyperlipidemia  6  CAD s/p Stent placement       Neuro: continue to closely monitor mental status exam performing q1h neuro checks  Place on seizure precautions, no need for PPx medications  Continue with scheduled tylenol and ok to administer oxycodone prn severe pain  Shunt Series xray ordered for this evening  CT Head w/o contrast for tomorrow morning  Neuro-surgical service will remain primary  CV: continue telemetry monitoring, HD stable, continue to closely monitor BP, home medications were restarted, continue Norvasc, Toprol xl, pravastatin, and diovan  Lung: continue SpO2 monitoring, no acute issues  Aggressive pulmonary toileting, encourage OOB to chair, incentive spirometry    GI: diet ordered, encourage intake    F/E/N: will check BMP and CBC in am, replete lytes prn, continue LR 100ml/h    : strict I/O with U/O measures    ID: monitor temps as well as WBC count,     Heme: monitor Hgb/Plt    Endo: no issues    Msk/Skin: reposition and turn q2h while in bed, out of bed to chair today, PT to work with patient today    Disposition: maintain ICU level of care through next 24 hours  Neurosurgery remains primary, [facility] will continue to follow along    Counseling / Coordination of Care  Total time spent today 40 minutes  Greater than 50% of total time was spent with the patient and / or family counseling and / or coordination of care   A description of the counseling / coordination of care: review of EMR, development of care and treatment plan  ______________________________________________________________________    VTE Pharmacologic Prophylaxis: s/p  shunt placement  VTE Mechanical Prophylaxis: sequential compression device    Invasive lines and devices: Invasive Devices     Peripheral Intravenous Line            Peripheral IV 07/11/18 Right Wrist less than 1 day                Code Status: Level 1 - Full Code  POA:    POLST:      Given critical illness, patient length of stay will require greater than two midnights  Portions of the record may have been created with voice recognition software  Occasional wrong word or "sound a like" substitutions may have occurred due to the inherent limitations of voice recognition software  Read the chart carefully and recognize, using context, where substitutions have occurred        Ellen Salinas PA-C    Consults

## 2018-07-11 NOTE — PLAN OF CARE
Problem: PHYSICAL THERAPY ADULT  Goal: Performs mobility at highest level of function for planned discharge setting  See evaluation for individualized goals  Treatment/Interventions: Functional transfer training, LE strengthening/ROM, Elevations, Therapeutic exercise, Endurance training, Patient/family training, Equipment eval/education, Bed mobility, Gait training  Equipment Recommended: Other (Comment) (roller walker)       See flowsheet documentation for full assessment, interventions and recommendations  Outcome: Progressing  Prognosis: Good  Problem List: Decreased strength, Decreased endurance, Impaired balance, Decreased mobility, Decreased coordination, Decreased safety awareness  Assessment: Therapist provided education on mobility technique including transfers and use of roller walker to improve mobility safety  Repetition was necessary for carryover of education received  Pt was noted to have improvement after education w/ decreased assist to maintain mobility safety and increased ambulation distance  Pt continues to need min assist and cuing for technique/safety  Pt continues to be at risk for falls  continued inpatient PT tx is indicated to reduce fall risk  Recommendation: Post acute IP rehab, Other (Comment) (pt and family refused  pt needs 24 hour assist and home PT)          See flowsheet documentation for full assessment

## 2018-07-11 NOTE — PROGRESS NOTES
Patient tolerated the procedure well  He is resting comfortably in PACU  He denies pain and is able to move all extremities  No focal deficits on exam  No questions at this time  We will continue to monitor

## 2018-07-11 NOTE — OP NOTE
OPERATIVE REPORT  PATIENT NAME: Karri Christianson    :  1935  MRN: [id number]  Pt Location: AN OR ROOM 04    SURGERY DATE: 2018    Surgeon(s) and Role:     * Elsi Philip MD - Primary     * Rick Kaur PA-C - Assisting    No qualified resident was available  GUSTABO was present for the procedure, and provided essential assistance with proper exposure, retraction, hemostasis, placement of  shunt, and wound closure, which was necessary secondary to the complex nature of this case  Preop Diagnosis:  Normal pressure hydrocephalus [G91 2]    Post-Op Diagnosis Codes:     * Normal pressure hydrocephalus [G91 2]     Procedure:  1  Insertion of right frontal ventriculoperitoneal shunt  Certas plus valve set to 6  Specimen(s):  ID Type Source Tests Collected by Time Destination   A : CSF CULTURE / GRAM STAIN Cerebrospinal Fluid Brain CSF CULTURE  Tien Diez MD 2018 1424        Estimated Blood Loss:   Minimal    Drains:       Anesthesia Type:   General    Operative Indications:  Normal pressure hydrocephalus [G91 2]    Operative Findings:  Clear CSF under moderate pressure  Complications:   None    Procedure and Technique:  Clinical Note    The goals and alternatives to the procedure listed above were discussed  The risks of surgery were discussed in detail with patient and family  1  Risk of general anesthetic with possible respiratory and cardiac complications  Risk of infection and bleeding were described  2  Risk of neurological injury with new pain, weakness or numbness in the arms and legs, difficulties with speech, language and vision  The risk of seizures was described  Risk of hemorrhage requiring reoperation was described  3  Risk of shunt malfunction including over drainage requiring reprogramming of the shunt for surgical revision of the shunt  4  Risk of injury to injure thoracic and intra-abdominal structures was described      Once all questions were answered to their satisfaction, they asked to proceed with surgery  Operating Room Note    The patient was brought to the operating room and transferred onto the OR table and placed under general anesthetic  All appropriate lines were placed  Care was taken to insure that all pressure points were padded and the neck was in a neutral position  The head was placed horseshoe head handley and turned to the left  The right side of head was shaved  An incision was planned in the mid pupillary line at the coronal suture while additional incisions were planned behind the ear and 3 finger breaths below the costal margin in the midline of the abdomen  The skin was then prepped and draped in usual sterile fashion  A full surgical timeout was undertaken identifying the site, side and type of surgery  It was confirmed that the patient received preoperative antibiotics  All incisions were infiltrated with 1% lidocaine with 100,000 of epinephrine  The abdominal incision was incised with a 15 blade  Monopolar cautery was used to dissect through the fascia  The underlying peritoneum was isolated with hemostats and the [place] 4 was used to ensure there is no underlying loops of bowel  The peritoneum was then incised with Metzenbaum scissors  Loops of bowel were visible a [place] 4 was placed at the entry to the peritoneum with a Lola Hope  The incision behind the ear was incised with a 15 blade  Monopolar cautery was used to dissect the outer table of the skull  A shunt passer was then passed from the abdominal incision to the incision behind the right ear over the chest wall and clavicle  The distal catheter was passed through the shunt passer and the shunt passer was then removed  The incision over the coronal suture was incised with a 15 blade  Monopolar cautery was used to dissect the outer table of the skull  A matchstick bur was used to drill a bur hole at the coronal suture   The dura was cauterized with bipolar cautery and bone wax was used for hemostasis of the bony edges  A uterine packing forceps was used to make a subgaleal pocket then passed from the incision in thefrontal area to the incision behind the ear  The distal catheter was then pulled from the incision behind the ear to the frontal incision at the coronal suture  The distal catheter was then primed with saline  A fresh 15 blade was used to incise the dura  Bipolar cautery was used for hemostasis of the dural leaflets and stephanie  A ventriculostomy catheter was placed perpendicular to the outer table of the skull and advanced in the mid pupillary line to 6 centimeters from the inner table of the skull  There was egress of CSF, which was collected for Gram stain  The catheter was then cut at 10 cm and secured to the proximal end of the shunt valve, which had been set to 6, with a silk tie  The distal catheter was then connected to the distal and to the shunt valve and secured with silk tie  The shunt valve was then placed in the subgaleal pocket and excess distal catheter was pulled through to the abdomen  The reservoir was pumped and there is egress of clear CSF from the distal catheter  The [place] 4 was removed from the opening in the peritoneum and the distal catheter was placed under direct visualization into the peritoneal cavity  The catheter was not anchored at the peritoneum  All surgical sites were irrigated copiously with antibiotic impregnated irrigation  Vicryl suture was used to approximate the edges of the peritoneum, followed by the fascia  Inverted Vicryl suture was used to approximate the subcutaneous tissue while staples were used to approximate the skin edges at the abdominal incision  An inverted Vicryl suture was used to approximate the galea at the incision behind the ear and at the coronal suture  Staples were used to approximate the skin edges at the incision behind the ear   Monocryl was used to approximate the skin edges at the incision at the coronal suture  The count was correct at the end of the case and there were no complications  The patient was carefully extubated and transferred onto the operating room gurney  They were transferred to the operating room where an air noted to be hemodynamically stable and neurologically unchanged  They will be admitted to ICU for ongoing observation  The results of surgery were discussed with the patient and family       I was present for the entire procedure    Patient Disposition:  PACU     SIGNATURE: Flaquita Weeks MD  DATE: July 11, 2018  TIME: 9:18 AM

## 2018-07-11 NOTE — INTERVAL H&P NOTE
H&P reviewed  After examining the patient I find no changes in the patients condition since the H&P had been written  Patient personally seen and examined  Neurological examination unchanged compared to last office/progress note, with the following exceptions:    None    Post operative instructions and medications have been reviewed with the patient and family  Assessment and Plan:    All questions have been answered to the patient and family satisfaction  Plan to proceed with insertion of right frontal ventriculoperitoneal shunt  They are in agreement with proceeding

## 2018-07-12 ENCOUNTER — APPOINTMENT (INPATIENT)
Dept: CT IMAGING | Facility: HOSPITAL | Age: 83
DRG: 033 | End: 2018-07-12
Payer: COMMERCIAL

## 2018-07-12 VITALS
OXYGEN SATURATION: 97 % | RESPIRATION RATE: 18 BRPM | HEART RATE: 87 BPM | DIASTOLIC BLOOD PRESSURE: 67 MMHG | SYSTOLIC BLOOD PRESSURE: 135 MMHG | WEIGHT: 209.22 LBS | TEMPERATURE: 97.8 F | BODY MASS INDEX: 32.84 KG/M2 | HEIGHT: 67 IN

## 2018-07-12 LAB
ANION GAP SERPL CALCULATED.3IONS-SCNC: 6 MMOL/L (ref 4–13)
BUN SERPL-MCNC: 13 MG/DL (ref 5–25)
CALCIUM SERPL-MCNC: 8.8 MG/DL (ref 8.3–10.1)
CHLORIDE SERPL-SCNC: 102 MMOL/L (ref 100–108)
CO2 SERPL-SCNC: 27 MMOL/L (ref 21–32)
CREAT SERPL-MCNC: 1.13 MG/DL (ref 0.6–1.3)
ERYTHROCYTE [DISTWIDTH] IN BLOOD BY AUTOMATED COUNT: 13.6 % (ref 11.6–15.1)
GFR SERPL CREATININE-BSD FRML MDRD: 60 ML/MIN/1.73SQ M
GLUCOSE SERPL-MCNC: 125 MG/DL (ref 65–140)
GLUCOSE SERPL-MCNC: 147 MG/DL (ref 65–140)
GLUCOSE SERPL-MCNC: 209 MG/DL (ref 65–140)
HCT VFR BLD AUTO: 42.7 % (ref 36.5–49.3)
HGB BLD-MCNC: 14.3 G/DL (ref 12–17)
MCH RBC QN AUTO: 31.2 PG (ref 26.8–34.3)
MCHC RBC AUTO-ENTMCNC: 33.5 G/DL (ref 31.4–37.4)
MCV RBC AUTO: 93 FL (ref 82–98)
PLATELET # BLD AUTO: 261 THOUSANDS/UL (ref 149–390)
PMV BLD AUTO: 10.2 FL (ref 8.9–12.7)
POTASSIUM SERPL-SCNC: 4.3 MMOL/L (ref 3.5–5.3)
RBC # BLD AUTO: 4.59 MILLION/UL (ref 3.88–5.62)
SODIUM SERPL-SCNC: 135 MMOL/L (ref 136–145)
WBC # BLD AUTO: 16.53 THOUSAND/UL (ref 4.31–10.16)

## 2018-07-12 PROCEDURE — 70450 CT HEAD/BRAIN W/O DYE: CPT

## 2018-07-12 PROCEDURE — 85027 COMPLETE CBC AUTOMATED: CPT | Performed by: PHYSICIAN ASSISTANT

## 2018-07-12 PROCEDURE — 99024 POSTOP FOLLOW-UP VISIT: CPT | Performed by: NEUROLOGICAL SURGERY

## 2018-07-12 PROCEDURE — 97530 THERAPEUTIC ACTIVITIES: CPT

## 2018-07-12 PROCEDURE — 99232 SBSQ HOSP IP/OBS MODERATE 35: CPT | Performed by: INTERNAL MEDICINE

## 2018-07-12 PROCEDURE — 97116 GAIT TRAINING THERAPY: CPT

## 2018-07-12 PROCEDURE — 80048 BASIC METABOLIC PNL TOTAL CA: CPT | Performed by: NEUROLOGICAL SURGERY

## 2018-07-12 RX ORDER — HEPARIN SODIUM 5000 [USP'U]/ML
5000 INJECTION, SOLUTION INTRAVENOUS; SUBCUTANEOUS EVERY 8 HOURS SCHEDULED
Status: DISCONTINUED | OUTPATIENT
Start: 2018-07-12 | End: 2018-07-12 | Stop reason: HOSPADM

## 2018-07-12 RX ADMIN — HEPARIN SODIUM 5000 UNITS: 5000 INJECTION, SOLUTION INTRAVENOUS; SUBCUTANEOUS at 10:29

## 2018-07-12 RX ADMIN — CHLORHEXIDINE GLUCONATE 15 ML: 1.2 RINSE ORAL at 08:44

## 2018-07-12 RX ADMIN — CILOSTAZOL 100 MG: 100 TABLET ORAL at 08:49

## 2018-07-12 RX ADMIN — CHOLECALCIFEROL TAB 25 MCG (1000 UNIT) 1000 UNITS: 25 TAB at 08:44

## 2018-07-12 RX ADMIN — AMLODIPINE BESYLATE 5 MG: 5 TABLET ORAL at 08:49

## 2018-07-12 RX ADMIN — SENNOSIDES 8.6 MG: 8.6 TABLET, FILM COATED ORAL at 08:44

## 2018-07-12 RX ADMIN — PSYLLIUM HUSK 1 PACKET: 3.4 POWDER ORAL at 08:49

## 2018-07-12 RX ADMIN — METOPROLOL SUCCINATE 50 MG: 50 TABLET, EXTENDED RELEASE ORAL at 08:44

## 2018-07-12 RX ADMIN — DOCUSATE SODIUM 100 MG: 100 CAPSULE, LIQUID FILLED ORAL at 08:44

## 2018-07-12 RX ADMIN — ACETAMINOPHEN 975 MG: 325 TABLET, FILM COATED ORAL at 06:13

## 2018-07-12 RX ADMIN — ACETAMINOPHEN 975 MG: 325 TABLET, FILM COATED ORAL at 14:21

## 2018-07-12 RX ADMIN — VALSARTAN 40 MG: 80 TABLET ORAL at 08:43

## 2018-07-12 NOTE — PROGRESS NOTES
Progress Note - Critical Care   Raegan Del Rio 81 y o  male MRN: [id number]  Unit/Bed#:  Encounter: [id number]    Attending Physician: Sridevi Amador MD      ______________________________________________________________________  Assessment and Plan:     1  Normal pressure hydrocephalus s/p R Frontal  shunt post op day 1  2  Gait and balance dysfunction 2/2 #1  3  Obesity  4  Hypertension  5  Hyperlipidemia  6  CAD s/p stent placement      Neuro: mental status stable without acute issues, continue neuro checks, Continue seizure precautions; however no need for PPx medications  Continue with scheduled tylenol with prn oxycodone severe pain  Follow up shunt series performed last evening and CT head obtained this am   Neuro-surg continues as primary care team    CV: continue telemetry monitoring, HD stable, continue to closely monitor BP, and continue norvasc, toprol XL, pravastatin, and diovan    Pulm: SpO2 stable without necessary supplementation  Continue aggressive pulmonary toileting, encourage OOB to chair, continue incentive spirometry    GI: encourage liquid and solid intake    : continue strict I/O and U/O measures    F/E/N: currently on LR 100ml/h, d/c fluids as patient tolerating POs, replete lytes prn    ID: monitor temps as well as WBC count, doubtful of infectious etiology    Heme: monitor Hgb/Plt    Endo: no acute issues     Msk/Skin: reposition and turn q2h while in bed, out of bed to chair, PT again to see and work with patient today  Recommendation at this time by PT is for acute IP rehab; however patient currently refusing inpatient and would like to discuss further options with case management / PT / primary care teams    Disposition: appropriate for MS today or if able to determine appropriate PT plan possibly even d/c    Code Status: Level 1 - Full Code    Counseling / Coordination of Care  Total time spent today 35 minutes   Greater than 50% of total time was spent with the patient and / or family counseling and / or coordination of care  A description of the counseling / coordination of care: development of care and treatment plan  ______________________________________________________________________    Chief Complaint: " I didn't sleep at all, but am feeling really good "    24 Hour Events: Patient seen and evaluated this am   Patient is s/p  shunt performed on 7/11  Patient reports occasional headache which did jimenez with tylenol  Shunt Series as well as CT Head performed and results are pending  Patient has no additional complaints  Review of Systems   Constitutional: Negative for activity change, appetite change, chills, diaphoresis, fatigue and fever  HENT: Negative  Eyes: Negative for visual disturbance  Respiratory: Negative  Cardiovascular: Negative  Gastrointestinal: Negative  Endocrine: Negative  Genitourinary: Negative  Musculoskeletal: Negative  Neurological: Positive for headaches  Negative for dizziness, seizures, syncope, speech difficulty and numbness  Hematological: Negative      Psychiatric/Behavioral: Negative       ______________________________________________________________________    Physical Exam:     General: 82 y/o M awake, alert in bed appearing in no acute distress  HEENT: Normocephalic, atraumatic, PERR, EOMI, sclera anicteric, conjunctiva pink, oropharynx patent, mucous membranes moist  Tongue midline, tolerating secretions  Neck: Supple, trachea midline  Heart: RRR without murmur, rub, or gallop  Lungs: Clear and equal all fields bilaterally, no wheezing, rales, or rhonchi  Abd: Soft, non-tender, no guarding, rebound, or peritoneal signs, active BS noted  Ext: FROM of upper and lower extremities, distal pulses intact  Neuro: GCS 15, conscious, alert,and oriented, non-focal exam, CNII-XII grossly intact   ______________________________________________________________________  Vitals:    07/12/18 0100 07/12/18 0200 07/12/18 0300 18 0400   BP: 123/58 126/64 123/58 113/55   BP Location:   Right arm Right arm   Pulse: 81 80 71 68   Resp:   15 16   Temp:   97 6 °F (36 4 °C)    TempSrc:   Oral    SpO2: 94% 93% 94% 97%   Weight:       Height:           Temperature:   Temp (24hrs), Av 7 °F (36 5 °C), Min:97 °F (36 1 °C), Max:98 8 °F (37 1 °C)    Current Temperature: 97 6 °F (36 4 °C)  Weights:   IBW: 66 1 kg    Body mass index is 32 77 kg/m²  Weight (last 2 days)     Date/Time   Weight    18 1040  94 9 (209 22)    18 0651  97 5 (215)            Hemodynamic Monitoring:  N/A     Non-Invasive/Invasive Ventilation Settings:  Respiratory    Lab Data (Last 4 hours)    None         O2/Vent Data (Last 4 hours)    None              No results found for: PHART, BBL4XCG, PO2ART, HKI7JTI, L5OIUVUN, BEART, SOURCE  SpO2: SpO2: 97 %  Intake and Outputs:  I/O       07/10 07 -  0700  07 -  0700    P  O   1130    I V  (mL/kg)  2418 8 (25 5)    IV Piggyback  50    Total Intake(mL/kg)  3598 8 (37 9)    Urine (mL/kg/hr)  1120 (0 5)    Total Output   1120    Net   +2478 8              UOP: >50 ml/hr   Nutrition:        Diet Orders            Start     Ordered    18 1115  Diet Regular; Regular House  Diet effective 1000     Question Answer Comment   Diet Type Regular    Regular Regular House    RD to adjust diet per protocol?  Yes        18 1114    18 1101  Room Service  Once     Question:  Type of Service  Answer:  Room Service - Appropriate with Assistance    18 1100          Labs:     Results from last 7 days  Lab Units 18  0410   WBC Thousand/uL 16 53*   HEMOGLOBIN g/dL 14 3   HEMATOCRIT % 42 7   PLATELETS Thousands/uL 261       Results from last 7 days  Lab Units 18  0009   SODIUM mmol/L 135*   POTASSIUM mmol/L 4 3   CHLORIDE mmol/L 102   CO2 mmol/L 27   BUN mg/dL 13   CREATININE mg/dL 1 13   CALCIUM mg/dL 8 8   GLUCOSE RANDOM mg/dL 209*         No results found for: PHOS       No results found for: Jose Medina      ABG:No results found for: PHART, XMZ1CFD, PO2ART, BPH4CZV, Q7JSPXXE, BEART, SOURCE  Imaging: Shunt Series, CT Head completed, awaiting results I have personally reviewed pertinent films in PACS  EKG: Telemetry monitor reviewed   Micro:  No results found for: Demetrius Arias, WOUNDCULT, SPUTUMCULTUR  Allergies: No Known Allergies  Medications:   Scheduled Meds:  Current Facility-Administered Medications:  acetaminophen 975 mg Oral Q8H Ravinder Avendano MD    amLODIPine 5 mg Oral Daily Jim Quiroz MD    chlorhexidine 15 mL Swish & Spit Q12H Ravinder Avendano MD    cholecalciferol 1,000 Units Oral Daily Jim Quiroz MD    cilostazol 100 mg Oral BID Jim Quiroz MD    docusate sodium 100 mg Oral BID Jim Quiroz MD    lactated ringers 100 mL/hr Intravenous Continuous Jim Quiroz MD Last Rate: 100 mL/hr (07/11/18 2305)   metoprolol succinate 50 mg Oral Daily Chicho Saldaña MD    ondansetron 4 mg Intravenous Q6H PRN Jim Quiroz MD    oxyCODONE 5 mg Oral Q6H PRN Jim Quiroz MD    pravastatin 80 mg Oral Daily With Luis Cleveland MD    psyllium 1 packet Oral Daily Jim Quiroz MD    senna 1 tablet Oral Daily Jim Quiroz MD    valsartan 40 mg Oral Daily Jim Quiroz MD      Continuous Infusions:  lactated ringers 100 mL/hr Last Rate: 100 mL/hr (07/11/18 2305)     PRN Meds:    ondansetron 4 mg Q6H PRN   oxyCODONE 5 mg Q6H PRN     VTE Pharmacologic Prophylaxis: currently not on PPx, will further discuss and is ambulating  VTE Mechanical Prophylaxis: sequential compression device  Invasive lines and devices: Invasive Devices     Peripheral Intravenous Line            Peripheral IV 07/11/18 Right Wrist less than 1 day                     Portions of the record may have been created with voice recognition software  Occasional wrong word or "sound a like" substitutions may have occurred due to the inherent limitations of voice recognition software    Read the chart carefully and recognize, using context, where substitutions have occurred      Virgina Habermann, PA-C

## 2018-07-12 NOTE — DISCHARGE INSTRUCTIONS
 Monitor incisions daily  Keep incision clean and dry   May shower and wash hair 72 hours after surgery   Avoid rubbing the incision, but gently massage hair   Do not use a hair dryer, and avoid hair products such as mousse, oils, and gels  Do not brush your hair away from the incision since this will put strain on the suture line   Do not dye or perm hair until cleared by MD Juliet Mcgovern Please remove dressing within 1-2 days after surgery   No soaking in tub   May walk as tolerated: 3 short walks daily   Avoid heavy lifting, pushing or pulling over 10lbs for 2 weeks   Do not drive until cleared by MD/PA   Coumadin, ASA, Plavix may be restarted once cleared by MD Juliet Mcgovern If you ever require a MRI a skull X-ray must be done before and after the MRI  There may be a need to reprogram your shunt, so contact the office   Follow-up for a 2 week incision check and staple removal as scheduled  Follow-up for a 6 week post-operative visit with a repeat CT head to be completed prior to your visit  **Please contact MD if incision becomes red, swelling, and tender or has increased drainage  Or if you experience increased headaches, difficulties walking, nausea/vomiting, changes in vision, and increased drowsiness or temp>101   **

## 2018-07-12 NOTE — PHYSICAL THERAPY NOTE
PHYSICAL THERAPY NOTE    Patient Name: John Fox  XIQXA'T Date: 18 1025   Pain Assessment   Pain Assessment 0-10   Pain Score No Pain   Restrictions/Precautions   Other Precautions Multiple lines; Fall Risk;Telemetry; Chair Alarm; Bed Alarm   General   Family/Caregiver Present No   Subjective   Subjective Patient supine in bed and is agreeable to therapy session  Patient identifers obtained from name and   Bed Mobility   Supine to Sit 5  Supervision  (close/SBA)   Additional items Assist x 1;HOB elevated; Bedrails; Increased time required;Verbal cues   Sit to Supine Unable to assess   Additional Comments Patient seated OOB in recliner post session with call bell and belongings in reach  Transfers   Sit to Stand 4  Minimal assistance  (contact guard)   Additional items Assist x 1; Increased time required;Verbal cues  (hand placement and body positioning )   Stand to Sit 4  Minimal assistance   Additional items Assist x 1;Verbal cues  (hand placement and body positioning)   Additional Comments Step ups with RW for B UE support x 4   Ambulation/Elevation   Gait pattern Decreased foot clearance;Shuffling; Foward flexed   Gait Assistance 4  Minimal assist   Additional items Assist x 1;Verbal cues; Tactile cues  (walker management)   Assistive Device Rolling walker   Distance 60' x2, 80', 190'x 1   Stair Management Assistance 4  Minimal assist   Additional items Assist x 1;Verbal cues; Increased time required   Stair Management Technique Two rails; Step to pattern; Foreward   Number of Stairs 3   Balance   Static Sitting Fair +   Dynamic Sitting Poor +   Static Standing Fair -   Dynamic Standing Poor +   Ambulatory Poor +  (roller walker)   Activity Tolerance   Activity Tolerance Patient limited by fatigue   Nurse Made Aware spoke to Sam Morales [place]    Assessment   Prognosis Good   Problem List Decreased strength;Decreased endurance; Impaired balance;Decreased mobility; Decreased coordination;Decreased safety awareness   Assessment Patient demonstrated ability to ambulate increased distance with roller walker and improved quality however remains unsteady requiring increased assistance  Provided verbal instruction for hand placement and body positioning during transfers for safety  Patient impulsive during session requiring additional cuing and education on safety with fair understanding  Initated stair training with step ups x 4 trials with roller walker for B UE support needing CGA, progressed to stair trail with ability to ascend and descend 3 steps with B handrails and min A x 1  Contine to focua on gait and stair progression with reinforcement of safety techniques  Goals   Patient Goals to go home   STG Expiration Date 07/21/18   Treatment Day 2   Plan   Treatment/Interventions Functional transfer training;LE strengthening/ROM; Elevations; Therapeutic exercise; Endurance training;Patient/family training;Equipment eval/education; Bed mobility;Gait training   Progress Progressing toward goals   PT Frequency Twice a day  (7 days a week)   Recommendation   Recommendation Post acute IP rehab  (pt and family refused   pt needs 24 hour assist and home PT)   Equipment Recommended Other (Comment)  (roller walker)       Aurea Marino, ALFREDO

## 2018-07-12 NOTE — SOCIAL WORK
CM met with patient and PT KERI  Patient stressed his concern about whether he will want to go to IP rehab or home with PT/OT  RJ discussed the difference of treatment with the patient as to whether he will get rehab everyday vs 2-3 times a week  Patient insisted that he chooses facilities  to go to in order to go to rehab  Patient was given the SNF list by JUAN  Patient asked for referrals to be sent top [facility] and [facility] placed referral through [facility]  CM then called patient's daughter to inform her of the patient's decision  As per daughter patient is not home alone and she is not sure why he changed his mind about Rehab being as thought he stated he wanted to go home on 7/11  CM informed the patient that [facility] was set up for PT/OT and patient's daughter said she doesn't see the problem with that  Patient's daughter said she will come in later today to speak with her father about the decisions he wants to make  CM left contact information for JUAN FELIX CM dept will continue to follow until d/c

## 2018-07-12 NOTE — PHYSICAL THERAPY NOTE
PHYSICAL THERAPY TREATMENT NOTE    Patient Name: Raul LACEY Date: 7/12/2018 07/12/18 1332   Pain Assessment   Pain Assessment No/denies pain   Subjective   Subjective pt seen sitting in chair  pt was called to bedside by Dank Shah regarding need to coordinate rehab services and discharge plan  pt states wanting to know what discharge plan will provide him w/ best outcomes  pt states that yesterday he and daughter did not refuse inpatient rehab and did not commit to any discharge plan from hospital     Activity Tolerance   Nurse Made Aware spoke to Alicia BHAT, Mariah Black and Danny Costello CM   Assessment   Prognosis Good   Problem List Decreased strength;Decreased endurance; Impaired balance;Decreased mobility; Decreased coordination;Decreased safety awareness   Assessment therapist met w/ pt to educate regarding discharge plan  Momo MARTINEZ and Cintia BHAT were present during parts of session  therapist provided education regarding levels of rehab (inpatient, home, and outpatient), frequency and length of PT intervention typically provided, and benefits/risk of each (varying levels of support, fall risk management)  pt stated understanding all presented material and requested specific length of stay data at inpatient rehab for his diagnosis and surgery  therapist did not have specific length of stay information  therapist looked in several medical datebases but specific information was not available for his surgery  pt stated understanding and reports wanting to go to inpatient rehab following discharge from hospital  therapist educated pt regarding continued PT plan of care including progressive mobility training and balance training, w/ pt being seen twice a day  pt is agreeable to treatment plan  JUAN and PERLITA informed of outcomes of education and care plan     Goals   Patient Goals go to rehab   STG Expiration Date 07/21/18 Treatment Day 3   Plan   Treatment/Interventions Functional transfer training;LE strengthening/ROM; Elevations; Therapeutic exercise; Endurance training;Patient/family training;Equipment eval/education; Bed mobility;Gait training   PT Frequency Twice a day  (7 days a week)   Recommendation   Recommendation Post acute IP rehab   Equipment Recommended Other (Comment)  (roller walker)     Skilled inpatient PT recommended while in hospital to progress pt toward treatment goals      Raudel Luu, PT

## 2018-07-12 NOTE — PROGRESS NOTES
Progress Note - Neurosurgery   Perry Marrero 81 y o  male MRN: [id number]  Unit/Bed#:  Encounter: [id number]    Assessment:  81year-old postop day 1 insertion of right frontal ventriculoperitoneal shunt for normal pressure hydrocephalus  Dressing clean and dry  No focal neurological deficits  Postoperative imaging demonstrates good positioning of right frontal  shunt in continuity into the right upper quadrant of the abdomen  A total of 15 min was spent with the patient, of which more than 50% was spent in direct counseling coordination of care  Plan:  1  Transfer to [place]  2   Start heparin for DVT prophylaxis  3   Remove right frontal and right occipital dressings today  4   Continue to work with physical therapy  5   Case management to work with patient and family with regards to discharge planning  Patient would prefer discharge home with home physical therapy and appropriate support at home as opposed to inpatient rehab as recommended by physical therapy  From a medical/surgical perspective, patient may be discharged  6   Continue to monitor neurological examination  VTE Prophylaxis: Sequential compression device Runadine Tena)     Subjective/Objective   Chief Complaint:  None  Vitals: Blood pressure 113/55, pulse 68, temperature 97 6 °F (36 4 °C), temperature source Oral, resp  rate 16, height 5' 7" (1 702 m), weight 94 9 kg (209 lb 3 5 oz), SpO2 97 %  ,Body mass index is 32 77 kg/m²  Hemodynamic Monitoring: None  Physical Exam:     Physical Exam    Neurologic Exam        Invasive Devices     Peripheral Intravenous Line            Peripheral IV 07/11/18 Right Wrist less than 1 day                          Lab Results:   I have personally reviewed pertinent results      Lab Results   Component Value Date    WBC 16 53 (H) 07/12/2018    HGB 14 3 07/12/2018    HCT 42 7 07/12/2018    MCV 93 07/12/2018     07/12/2018    MCH 31 2 07/12/2018    MCHC 33 5 07/12/2018    RDW 13 6 07/12/2018    MPV 10 2 07/12/2018     (L) 07/12/2018     07/12/2018    CO2 27 07/12/2018    ANIONGAP 6 07/12/2018    BUN 13 07/12/2018    CREATININE 1 13 07/12/2018    GLUCOSE 209 (H) 07/12/2018    CALCIUM 8 8 07/12/2018    EGFR 60 07/12/2018       Imaging Studies: I have personally reviewed pertinent films in PACS    CT scan of the head without contrast dated July 12th, 2018  Interval placement of right frontal ventricular catheter  Small amount of expected pneumocephalus  No new intra-axial or extra-axial lesions  Shunt series dated July 11th, 2018  Right frontal  shunt, Certas plus set to 6  Shunt is in continuity over right chest wall and terminates in right upper quadrant of abdomen  Other Studies:  None

## 2018-07-12 NOTE — SOCIAL WORK
LOS-1 DAY  PATIENT IS NOT A BUNDLE  PATIENT IS NOT A READMIT  CM met with patient at bedside  Patient lives in a 3 story house with his wife located in [place]  Per patient he only stays on the 1st floor  Patient mentioned he has a chairlift, walker, and avelar but can complete ADLS by himself  Patient has no Hx of VNA or inpt rehab  [facility] [address]  AND has prescription coverage  Patient states his children are in town and will be staying with him for sometime  Either his daughter or son will be picking patient at d/c  Patient receives SSI  CM discussed the rehab vs [facility] for patient at d/c Patient stated he would rather go to OP PT than going to a SNF  As per occupational therapist she mentioned that it would be best for patient to have Home Pt /OT at discharged  Patient is agreeable to having services at home  CM placed a referral to [facility] through [address]  CM reviewed d/c planning process : identifying caregivers at home, d/c needs, [facility] to beds  CM will continue to follow until d/c

## 2018-07-12 NOTE — PLAN OF CARE
Problem: PHYSICAL THERAPY ADULT  Goal: Performs mobility at highest level of function for planned discharge setting  See evaluation for individualized goals  Treatment/Interventions: Functional transfer training, LE strengthening/ROM, Elevations, Therapeutic exercise, Endurance training, Patient/family training, Equipment eval/education, Bed mobility, Gait training  Equipment Recommended: Other (Comment) (roller walker)       See flowsheet documentation for full assessment, interventions and recommendations  Outcome: Progressing  Prognosis: Good  Problem List: Decreased strength, Decreased endurance, Impaired balance, Decreased mobility, Decreased coordination, Decreased safety awareness  Assessment: therapist met w/ pt to educate regarding discharge plan  Momo MARTINEZ and Cintia BHAT were present during parts of session  therapist provided education regarding levels of rehab (inpatient, home, and outpatient), frequency and length of PT intervention typically provided, and benefits/risk of each (varying levels of support, fall risk management)  pt stated understanding all presented material and requested specific length of stay data at inpatient rehab for his diagnosis and surgery  therapist did not have specific length of stay information  therapist looked in several medical datebases but specific information was not available for his surgery  pt stated understanding and reports wanting to go to inpatient rehab following discharge from hospital  therapist educated pt regarding continued PT plan of care including progressive mobility training and balance training, w/ pt being seen twice a day  pt is agreeable to treatment plan  JUAN and PERLITA informed of outcomes of education and care plan  Recommendation: Post acute IP rehab          See flowsheet documentation for full assessment

## 2018-07-12 NOTE — PLAN OF CARE
Problem: PHYSICAL THERAPY ADULT  Goal: Performs mobility at highest level of function for planned discharge setting  See evaluation for individualized goals  Treatment/Interventions: Functional transfer training, LE strengthening/ROM, Elevations, Therapeutic exercise, Endurance training, Patient/family training, Equipment eval/education, Bed mobility, Gait training  Equipment Recommended: Other (Comment) (roller walker)       See flowsheet documentation for full assessment, interventions and recommendations  Outcome: Progressing  Prognosis: Good  Problem List: Decreased strength, Decreased endurance, Impaired balance, Decreased mobility, Decreased coordination, Decreased safety awareness  Assessment: Patient demonstrated ability to ambulate increased distance with roller walker and improved quality however remains unsteady requiring increased assistance  Provided verbal instruction for hand placement and body positioning during transfers for safety  Patient impulsive during session requiring additional cuing and education on safety with fair understanding  Initated stair training with step ups x 4 trials with roller walker for B UE support needing CGA, progressed to stair trail with ability to ascend and descend 3 steps with B handrails and min A x 1  Contine to focua on gait and stair progression with reinforcement of safety techniques  Recommendation: Post acute IP rehab (pt and family refused  pt needs 24 hour assist and home PT)          See flowsheet documentation for full assessment

## 2018-07-12 NOTE — PROGRESS NOTES
Post Op Check Note -Surgery PA  Nancy Vazquez 81 y o  male MRN: [id number]  Unit/Bed#:  Encounter: [id number]    ASSESSMENT/PLAN:  Problem List     * (Principal)Normal pressure hydrocephalus    Essential hypertension (Chronic)    Hyperlipidemia   80 yo male POD #0 s/p  shunt  Pain controlled, tolerating diet  PT recommends rehab  Pt not sure if he wants to go  If he does go home he needs 24 hour supervision and home PT  No complaints  · Pain control   · Follow up on shunt series -read pending  · CTH in am  · PT        Subjective/Objective     Subjective: Nancy aVzquez is a 81 y o  male who is status post  shunt  No complaints  Voiding and tolerating diet    Objective/Physical Exam:   Blood pressure 134/92, pulse 98, temperature 97 9 °F (36 6 °C), temperature source Oral, resp  rate 20, height 5' 7" (1 702 m), weight 94 9 kg (209 lb 3 5 oz), SpO2 94 %  ,Body mass index is 32 77 kg/m²      General appearance: alert and oriented, in no acute distress  Head: dressing in place, no active bleeding or edema,  Abdomen: soft, non-tender; bowel sounds normal; no masses,  no organomegaly and dressing C/D/I       Current Facility-Administered Medications:     acetaminophen (TYLENOL) tablet 975 mg, 975 mg, Oral, Q8H [facility], Chandler Jaquez MD, 975 mg at 07/11/18 2103    amLODIPine (NORVASC) tablet 5 mg, 5 mg, Oral, Daily, Chandler Jaquez MD, 5 mg at 07/11/18 1132    ceFAZolin (ANCEF) IVPB (premix) 2,000 mg, 2,000 mg, Intravenous, Q8H, Chandler Jaquez MD, Stopped at 07/11/18 1700    chlorhexidine (PERIDEX) 0 12 % oral rinse 15 mL, 15 mL, Swish & Spit, Q12H [facility], Chandler Jaquez MD, 15 mL at 07/11/18 2052    cholecalciferol (VITAMIN D3) tablet 1,000 Units, 1,000 Units, Oral, Daily, Chandler Jaquez MD, 1,000 Units at 07/11/18 1132    cilostazol (PLETAL) tablet 100 mg, 100 mg, Oral, BID, Chandler Jaquez MD, 100 mg at 07/11/18 1759    docusate sodium (COLACE) capsule 100 mg, 100 mg, Oral, BID, Chandler Jaquez MD, 100 mg at 07/11/18 1759    lactated ringers infusion, 100 mL/hr, Intravenous, Continuous, Arik Hui MD    metoprolol succinate (TOPROL-XL) 24 hr tablet 50 mg, 50 mg, Oral, Daily, Arik Hui MD, 50 mg at 07/11/18 1133    ondansetron (ZOFRAN) injection 4 mg, 4 mg, Intravenous, Q6H PRN, Arik Hui MD    oxyCODONE (ROXICODONE) IR tablet 5 mg, 5 mg, Oral, Q6H PRN, Arik Hui MD    pravastatin (PRAVACHOL) tablet 80 mg, 80 mg, Oral, Daily With Cathie Delvalle MD, 80 mg at 07/11/18 1759    psyllium (METAMUCIL) 1 packet, 1 packet, Oral, Daily, Arik Hui MD, 1 packet at 07/11/18 1200    senna (SENOKOT) tablet 8 6 mg, 1 tablet, Oral, Daily, Arik Hui MD, 8 6 mg at 07/11/18 1133    valsartan (DIOVAN) tablet 40 mg, 40 mg, Oral, Daily, Arik Hui MD, 40 mg at 07/11/18 1132

## 2018-07-12 NOTE — CASE MANAGEMENT
Thank you,  Lawson Aqq  [facility] Utilization Review Department  Phone: [phone]; Fax [phone]  ATTENTION: The Network Utilization Review Department is now centralized for our 9 Facilities  Make a note that we have a new phone and fax numbers for our Department  Please call with any questions or concerns to [phone] and carefully follow the prompts so that you are directed to the right person  All voicemails are confidential  Fax any determinations, approvals, denials, and requests for initial or continue stay review clinical to [phone]  Due to HIGH CALL volume, it would be easier if you could please send faxed requests to expedite your requests and in part, help us provide discharge notifications faster  Elective Inpatient surgery    RHETT#[id number]    Initial Clinical Review    Age/Sex: 81 y o  male    Surgery Date: 7/11/18     Procedure: Insertion of right frontal ventriculoperitoneal shunt  Certas plus valve set to 6  Anesthesia: General     Admission Orders: Date/Time/Statement: Inpatient 7/11/18 @ 32 70 61     Orders Placed This Encounter   Procedures    Inpatient Admission     Standing Status:   Standing     Number of Occurrences:   1     Order Specific Question:   Admitting Physician     Answer:   Alicja Cowley     Order Specific Question:   Level of Care     Answer:   Critical Care [15]     Order Specific Question:   Estimated length of stay     Answer:   More than 2 Midnights     Order Specific Question:   Certification     Answer:   I certify that inpatient services are medically necessary for this patient for a duration of greater than two midnights  See H&P and MD Progress Notes for additional information about the patient's course of treatment         Vital Signs: /67 (BP Location: Right arm)   Pulse 87   Temp 97 8 °F (36 6 °C) (Oral)   Resp 18   Ht 5' 7" (1 702 m)   Wt 94 9 kg (209 lb 3 5 oz)   SpO2 97%   BMI 32 77 kg/m²     Diet:   Diet Orders       Start     Ordered    07/11/18 1115  Diet Regular; Regular House  Diet effective 1000     Question Answer Comment   Diet Type Regular    Regular Regular House    RD to adjust diet per protocol? Yes        07/11/18 1114       Mobility: OOB, ambulate as tolerated     DVT Prophylaxis: SCD's     Pain Medications             aspirin (ECOTRIN) 325 mg EC tablet Take 325 mg by mouth every 6 (six) hours as needed for mild pain   Orders:   Regular diet   Seizure precautions   I's&O's   PT/OT eval   SCD's   Neuro checks q 1 hr     7/12 Per neuro 81year-old postop day 1 insertion of right frontal ventriculoperitoneal shunt for normal pressure hydrocephalus  Dressing clean and dry  No focal neurological deficits  Postoperative imaging demonstrates good positioning of right frontal  shunt in continuity into the right upper quadrant of the abdomen  Start heparin for DVT prophylaxis  Remove right frontal and right occipital dressings today  Continue to work with physical therapy  Case management to work with patient and family with regards to discharge planning  Patient would prefer discharge home with home physical therapy and appropriate support at home as opposed to inpatient rehab as recommended by physical therapy  Continue to monitor neurological examination

## 2018-07-12 NOTE — PLAN OF CARE
Problem: DISCHARGE PLANNING - CARE MANAGEMENT  Goal: Discharge to post-acute care or home with appropriate resources  INTERVENTIONS:  - Conduct assessment to determine patient/family and health care team treatment goals, and need for post-acute services based on payer coverage, community resources, and patient preferences, and barriers to discharge  - Address psychosocial, clinical, and financial barriers to discharge as identified in assessment in conjunction with the patient/family and health care team  - Arrange appropriate level of post-acute services according to patients   needs and preference and payer coverage in collaboration with the physician and health care team  - Communicate with and update the patient/family, physician, and health care team regarding progress on the discharge plan  - Arrange appropriate transportation to post-acute venues   Outcome: Completed Date Met: 07/12/18  CM met with patient, family, and Dr Sam Flannery to discuss DCP  Everyone is in agreement for patient to discharge home with VNA services in place  Cm reviewed that patient is set up with [facility] [place] [address] and that his first visit will be tomorrow  Contact information has been added into the AVS   No further CM discharge needs were discussed or identified  STR referrals were cancelled  Patient's family will be transporting patient home  CM reviewed the availability of treatment team to discuss questions or concerns patient and/or family may have regarding  understanding medications and recognizing signs and symptoms once discharged  CM also encouraged patient to follow up with all recommended appointments after discharge  Patient advised of importance for patient and family to participate in managing patients medical well being

## 2018-07-12 NOTE — SOCIAL WORK
CM met with patient, family, and Dr Paul Pelaez to discuss DCP  Everyone is in agreement for patient to discharge home with VNA services in place  Cm reviewed that patient is set up with [facility] and that his first visit will be tomorrow  Contact information has been added into the AVS   No further CM discharge needs were discussed or identified  STR referrals were cancelled  Patient's family will be transporting patient home  CM reviewed the availability of treatment team to discuss questions or concerns patient and/or family may have regarding  understanding medications and recognizing signs and symptoms once discharged  CM also encouraged patient to follow up with all recommended appointments after discharge  Patient advised of importance for patient and family to participate in managing patients medical well being

## 2018-07-12 NOTE — PHYSICAL THERAPY NOTE
Physical Therapy Cancellation Note        Chart check performed  Con Scales stated pt will be discharged this afternoon and decision was made that pt will be returning home w/ family support and home PT  Spoke to pt and daughter at bedside  They stated feeling confident about discharge home and declined participation in PT due to pending discharge  Notified [facility] of cancellation      Fátima Galindos, PT

## 2018-07-12 NOTE — PLAN OF CARE
Problem: DISCHARGE PLANNING - CARE MANAGEMENT  Goal: Discharge to post-acute care or home with appropriate resources  INTERVENTIONS:  - Conduct assessment to determine patient/family and health care team treatment goals, and need for post-acute services based on payer coverage, community resources, and patient preferences, and barriers to discharge  - Address psychosocial, clinical, and financial barriers to discharge as identified in assessment in conjunction with the patient/family and health care team  - Arrange appropriate level of post-acute services according to patients   needs and preference and payer coverage in collaboration with the physician and health care team  - Communicate with and update the patient/family, physician, and health care team regarding progress on the discharge plan  - Arrange appropriate transportation to post-acute venues   Outcome: Progressing  CM met with patient and PT , [address]  Patient stressed his concern about whether he will want to go to IP rehab or home with PT/OT  RJ discussed the difference of treatment with the patient as to whether he will get rehab everyday vs 2-3 times a week  Patient insisted that he chooses facilities  to go to in order to go to rehab  Patient was given the SNF list by CM  Patient asked for referrals to be sent top [facility] and [facility]  CM placed referral through [facility]  CM then called patient's daughter to inform her of the patient's decision  As per daughter patient is not home alone and she is not sure why he changed his mind about Rehab being as thought he stated he wanted to go home on 7/11  CM informed the patient that VNA was set up for PT/OT and patient's daughter said she doesn't see the problem with that  Patient's daughter said she will come in later today to speak with her father about the decisions he wants to make       CM left contact information for JUAN FELIX CM dept will continue to follow until d/c

## 2018-07-12 NOTE — PLAN OF CARE
Problem: DISCHARGE PLANNING - CARE MANAGEMENT  Goal: Discharge to post-acute care or home with appropriate resources  INTERVENTIONS:  - Conduct assessment to determine patient/family and health care team treatment goals, and need for post-acute services based on payer coverage, community resources, and patient preferences, and barriers to discharge  - Address psychosocial, clinical, and financial barriers to discharge as identified in assessment in conjunction with the patient/family and health care team  - Arrange appropriate level of post-acute services according to patients   needs and preference and payer coverage in collaboration with the physician and health care team  - Communicate with and update the patient/family, physician, and health care team regarding progress on the discharge plan  - Arrange appropriate transportation to post-acute venues  Outcome: Progressing  LOS-1 DAY  PATIENT IS NOT A BUNDLE  PATIENT IS NOT A READMIT  CM met with patient at bedside  Patient lives in a 3 story house with his wife located in [place]  Per patient he only stays on the 1st floor  Patient mentioned he has a chairlift, walker, and avelar but can complete ADLS by himself  Patient has no Hx of VNA or inpt rehab  [facility] [address]  AND has prescription coverage  Patient states his children are in town and will be staying with him for sometime  Either his daughter or son will be picking patient at d/c  Patient receives SSI  CM discussed the rehab vs [facility] for patient at d/c Patient stated he would rather go to OP PT than going to a SNF  As per occupational therapist she mentioned that it would be best for patient to have Home Pt /OT at discharged  Patient is agreeable to having services at home  CM placed a referral to [facility] through [facility]  CM reviewed d/c planning process : identifying caregivers at home, d/c needs, [facility] to beds   CM will continue to follow until d/c

## 2018-07-13 NOTE — DISCHARGE SUMMARY
Discharge Summary - Shannan Garland 81 y o  male MRN: [id number]    Unit/Bed#:  Encounter: [id number]    Admission Date: 7/11/2018   Discharge Date: 07/12/2018  Admitting Diagnosis:   Normal pressure hydrocephalus [G91 2]    Discharge Diagnoses: Principal Problem:    Normal pressure hydrocephalus  Active Problems:    Neurologic gait dysfunction    Class 1 obesity in adult    Essential hypertension    Hyperlipidemia      Consultations: critical care    Procedures Performed: Procedure(s):  FRONTAL VENTRICULOPERITONEAL [facility] Course: Shannan Garland is a 81 y o  male admitted for elective placement of above procedure  He was admitted to the ICU after the surgery for monitoring  Neuro checks were performed every hour  Shunt series xrays were ordered for that were negative  He was placed on a regular diet and was given pain control  Case management and PT were consulted for dispo planning and agreed he can be discharged home with PT  His dressings were removed on POD 1 and left open to air  CT was ordered as well that stated no hemorrhage  All discharge questions were answered and he was picked up by family   Condition at Discharge: good     Discharge instructions/Information to patient and family:   See after visit summary for information provided to patient and family  Provisions for Follow-Up Care:  See after visit summary for information related to follow-up care and any pertinent home health orders  Disposition: Home    Planned Readmission: No    Discharge Statement   I spent 30 minutes discharging the patient  This time was spent on the day of discharge  I had direct contact with the patient on the day of discharge  Additional documentation is required if more than 30 minutes were spent on discharge  Discharge Medications:  See after visit summary for reconciled discharge medications provided to patient and family

## 2018-07-14 LAB — BACTERIA CSF CULT: NO GROWTH

## 2018-07-17 ENCOUNTER — TELEPHONE (OUTPATIENT)
Dept: NEUROSURGERY | Facility: CLINIC | Age: 83
End: 2018-07-17

## 2018-07-17 NOTE — TELEPHONE ENCOUNTER
Spoke with Joshua Gambino to see how he is doing after being d/c'd from the hospital  He reports that he is doing very well overall and denies any incisional issues or fevers  Patient denies any bleeding, dizziness, fever, redness, significant pain and swelling  He also denies any changes to his vision or mental status  Verified date/time/location of his upcoming POV on 7/25/2018 and advised him to call the office with any further questions or concerns, or if any incisional issues or fevers would arise  Went over incision care with patient and he has no further questions at this time  Patient was appreciative for the call

## 2018-07-25 ENCOUNTER — CLINICAL SUPPORT (OUTPATIENT)
Dept: NEUROSURGERY | Facility: CLINIC | Age: 83
End: 2018-07-25

## 2018-07-25 VITALS
TEMPERATURE: 97.2 F | DIASTOLIC BLOOD PRESSURE: 78 MMHG | SYSTOLIC BLOOD PRESSURE: 124 MMHG | BODY MASS INDEX: 32.33 KG/M2 | RESPIRATION RATE: 16 BRPM | HEIGHT: 67 IN | WEIGHT: 206 LBS

## 2018-07-25 DIAGNOSIS — Z98.890 STATUS POST SURGERY: Primary | ICD-10-CM

## 2018-07-25 PROCEDURE — 99024 POSTOP FOLLOW-UP VISIT: CPT

## 2018-07-25 NOTE — PROGRESS NOTES
Post-Op Visit-Neurosurgery    Alana Allan 81 y o  male MRN: [id number]     Vitals:    07/25/18 1057   BP: 124/78   Resp: 16   Temp: (!) 97 2 °F (36 2 °C)   Weight: 93 4 kg (206 lb)   Height: 5' 7" (1 702 m)       Chief Complaint  Patient presents post: insertion of right frontal  shunt certas plus valve set to 6  History of Present Illness  Patient presents for 2 week POV for incision check  He reports that he is doing well overall and denies any incisional issues or fevers  He does have intermittent headaches which typically resolve on their own or with tylenol  Denies any other pain symptoms, changes to his vision or mental status  Assessment  Wound Exam: Incisions are all well approximated  No erythema, edema or drainage present  Procedure  Staple/suture removal    location: Right scalp, behind right ear and midline abdomen  Procedure Note:9 staples behind the ear and 6 abdominal staples were removed  Patient Status:  the patient tolerated the procedure well  Complications: None  Incisions DEEP  Discussion/Summary  Reviewed incision care with patient including daily observation for s/s infection including: increased erythema, edema, drainage, dehiscence of incision or fever >101  Should these be observed, he understands that he is to call and/or return immediately for reassessment  Advised patient to continue cleansing area with mild soap and water and pat dry  Not to apply any lotions, creams, or ointments, & not to submerge in any water for two more weeks  Activity levels were also reviewed with the patient in detail, he is to lift no greater than 10 pounds and ambulation is encouraged as tolerated  Verified date/time/location of upcoming POV and reminded him to complete CT scan prior to his next scheduled visit on 8/9/2018, which is already scheduled     He is to call the office with any further questions or concerns, or if any incisional issues or fevers would arise

## 2018-07-26 ENCOUNTER — TELEPHONE (OUTPATIENT)
Dept: NEUROSURGERY | Facility: CLINIC | Age: 83
End: 2018-07-26

## 2018-07-26 DIAGNOSIS — Z98.2 S/P VP SHUNT: Primary | ICD-10-CM

## 2018-07-26 DIAGNOSIS — R26.9 GAIT DISTURBANCE: ICD-10-CM

## 2018-07-26 NOTE — TELEPHONE ENCOUNTER
Received call from home PT stating that patient would prefer to go to outpatient therapy if you were okay with it? Please advise id you're okay with this and I can place the order

## 2018-08-06 ENCOUNTER — HOSPITAL ENCOUNTER (OUTPATIENT)
Dept: CT IMAGING | Facility: HOSPITAL | Age: 83
Discharge: HOME | End: 2018-08-06
Attending: NEUROLOGICAL SURGERY
Payer: COMMERCIAL

## 2018-08-06 DIAGNOSIS — Z98.2 S/P VP SHUNT: ICD-10-CM

## 2018-08-06 DIAGNOSIS — R26.9 GAIT DISTURBANCE: ICD-10-CM

## 2018-08-06 DIAGNOSIS — G91.2 NPH (NORMAL PRESSURE HYDROCEPHALUS) (HCC): ICD-10-CM

## 2018-08-06 PROCEDURE — 70450 CT HEAD/BRAIN W/O DYE: CPT

## 2018-08-07 NOTE — PROGRESS NOTES
Jessica Lazo will be discharged from PT as she is not in further need but will follow up as needed for further gait evaluation

## 2018-08-09 ENCOUNTER — OFFICE VISIT (OUTPATIENT)
Dept: NEUROSURGERY | Facility: CLINIC | Age: 83
End: 2018-08-09

## 2018-08-09 ENCOUNTER — OFFICE VISIT (OUTPATIENT)
Dept: PHYSICAL THERAPY | Facility: CLINIC | Age: 83
End: 2018-08-09
Payer: COMMERCIAL

## 2018-08-09 VITALS
TEMPERATURE: 97.4 F | RESPIRATION RATE: 18 BRPM | DIASTOLIC BLOOD PRESSURE: 76 MMHG | HEIGHT: 67 IN | WEIGHT: 210 LBS | SYSTOLIC BLOOD PRESSURE: 128 MMHG | BODY MASS INDEX: 32.96 KG/M2

## 2018-08-09 DIAGNOSIS — G91.2 NORMAL PRESSURE HYDROCEPHALUS (HCC): ICD-10-CM

## 2018-08-09 DIAGNOSIS — Z98.890 STATUS POST SURGERY: Primary | ICD-10-CM

## 2018-08-09 DIAGNOSIS — R26.9 GAIT DISTURBANCE: Primary | ICD-10-CM

## 2018-08-09 DIAGNOSIS — Z98.2 S/P VP SHUNT: ICD-10-CM

## 2018-08-09 PROCEDURE — G8978 MOBILITY CURRENT STATUS: HCPCS | Performed by: PHYSICAL THERAPIST

## 2018-08-09 PROCEDURE — 97162 PT EVAL MOD COMPLEX 30 MIN: CPT | Performed by: PHYSICAL THERAPIST

## 2018-08-09 PROCEDURE — 99024 POSTOP FOLLOW-UP VISIT: CPT | Performed by: NEUROLOGICAL SURGERY

## 2018-08-09 PROCEDURE — G8979 MOBILITY GOAL STATUS: HCPCS | Performed by: PHYSICAL THERAPIST

## 2018-08-09 RX ORDER — CEPHALEXIN 250 MG/1
CAPSULE ORAL
Qty: 5 CAPSULE | Refills: 0 | Status: SHIPPED | OUTPATIENT
Start: 2018-08-09 | End: 2018-08-30

## 2018-08-09 NOTE — PROGRESS NOTES
PT Evaluation     Today's date: 2018  Patient name: Raegan Del Rio  : 1935  MRN: [id number]  Referring provider: Alcon Graham MD  Dx:   Encounter Diagnosis     ICD-10-CM    1  Gait disturbance R26 9 Ambulatory referral to Physical Therapy                  Assessment  Impairments: abnormal coordination, abnormal gait, abnormal muscle firing, abnormal or restricted ROM, abnormal movement, activity intolerance, difficulty understanding, impaired balance, impaired physical strength, lacks appropriate home exercise program, pain with function, safety issue, weight-bearing intolerance and poor posture     Assessment details: Raegan Del Rio is a 81 y o  male who presents with decreased strength, ambulatory dysfunction and balance dysfunction  Due to these impairments, Patient has difficulty performing a/iadls, recreational activities and engaging in social activities  Patient's clinical presentation is consistent with their referring diagnosis  Patient would benefit from skilled physical therapy to address their aforementioned impairments, improve their level of function and to improve their overall quality of life  Understanding of Dx/Px/POC: good   Prognosis: fair    Goals  Short Term Goals: to be achieved in 4 weeks  1) Patient to be independent with basic HEP  2) Increase LE strength by 1/2 MMT grade in all deficient planes  3) Pt will demonstrate improved TUG score by 3 seconds    Long Term Goals: to be achieved by discharge  1) FOTO equal to or greater projected score  2) Patient to be independent with comprehensive HEP  3) Patient will be able to descend stairs reciprocally with use of 1 UE hand rails    4) Pt will demonstrate improved DGI score by at least 3 points    Plan  Patient would benefit from: skilled PT  Referral necessary: No  Planned modality interventions: biofeedback, cryotherapy, hydrotherapy, TENS and unattended electrical stimulation  Planned therapy interventions: activity modification, ADL retraining, balance, balance/weight bearing training, behavior modification, body mechanics training, functional ROM exercises, gait training, home exercise program, IADL retraining, joint mobilization, manual therapy, massage, neuromuscular re-education, patient education, strengthening, stretching, therapeutic activities, therapeutic exercise, transfer training, graded exercise and postural training  Frequency: 2x week  Duration in weeks: 6  Plan of Care beginning date: 8/9/2018  Plan of Care expiration date: 9/20/2018  Treatment plan discussed with: patient        Subjective Evaluation    History of Present Illness  Date of surgery: 7/11/2018  Mechanism of injury: Pt notes that since his  shunt placement he has been much better  He is ambulating with a quad cane instead of a RW  He denies any falls but does report that he had a near miss recently but was able to catch himself  He is challenged with curbs and requires use of handrails with stair climbing  Otherwise his ADL's are better  He does report a fleeting headache that occurs intermittently, but denies any other issues  He would like to continue with additional PT to further improve his gait and balance  He reports having 2 steps to enter his home, and then lives on the first floor of a multi-level home  Recurrent probem    Quality of life: good    Pain  No pain reported    Patient Goals  Patient goal: Be able to walk unassisted- and no longer require a SPC  Objective     Ambulation     Comments   Gait- decreased step height and step length, decreased foot clearance and slowed speed  Ambulates with NBQC   Shuffling gait in tight spaces and when navigating obstacles  TUG- 19 47 sec, no AD used, most challenge with turns  Cognitive TUG-26 54; required a few seconds upon standing prior to initiating gait   DGI- 11/24 no AD used  Stairs- alternating ascending w/1 rail, 1 at a time descending, both hands on 1 rail (turned sideways) to descend  Retropulsion- negative     LE strength- 4/5 t/o sary LE's except 3+/5 R ankle DF      Flowsheet Rows      Most Recent Value   PT/OT G-Codes   FOTO information reviewed  N/A   Assessment Type  Evaluation   G code set  Mobility: Walking & Moving Around   Mobility: Walking and Moving Around Current Status ()  CL   Mobility: Walking and Moving Around Goal Status ()  CJ          Precautions: Hx of stents, NPH w/shunt placement, fall risk, HTN    Daily Treatment Diary     Manual                                                                                   Exercise Diary  8/9            bike             Repeated sit to stand             Side stepping             Heel raises             Standing hip 3 way             Static stance on foam             Tandem stance             Cone step over             Gait training w/obstacle navigation                                                                                                                                                                Modalities

## 2018-08-09 NOTE — PROGRESS NOTES
Office Note - Neurosurgery   Latonya People 81 y o  male MRN: [id number]      Assessment:    Patient is rapidly improving  73-year-old gentleman post insertion of 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  He has had objective and subjective improvement in his gait and cognition since surgery  He is very pleased with his progress  He will continue to work with physical therapy  He will follow-up in 6 months time through the integrated normal pressure hydrocephalus program with PT and cognitive assessment  He will contact this office should any concerns arise in the interim  He was provided a prescription for Keflex in anticipation of the dental procedure next week  From a neurosurgical perspective, he can proceed with colonoscopy in September as planned  History, physical examination and diagnostic tests were reviewed and questions answered  Diagnosis, care plan and treatment options were discussed  The patient understand instructions and will follow up as directed  Plan:    Follow-up: in 6 month(s)    Problem List Items Addressed This Visit        Nervous and Auditory    Normal pressure hydrocephalus       Other    S/P  shunt      Other Visit Diagnoses     Status post surgery    -  Primary    Relevant Medications    cephalexin (KEFLEX) 250 mg capsule          Subjective/Objective     Chief Complaint    None  HPI    Six weeks post insertion of 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  The patient is noted significant improvement in his gait and balance since surgery  He no longer uses a walker and is able to ambulate with a cane  His cognition he feels has improved to some extent as well  He denies any difficulties with the incisions  Overall he is very pleased with the results of surgery and continues to actively participate with physical therapy on an outpatient basis  ROS    Review of Systems   HENT: Negative  Eyes: Negative      Respiratory: Negative  Cardiovascular: Negative  Gastrointestinal: Negative  Endocrine: Negative  Genitourinary: Negative  Musculoskeletal: Negative  Allergic/Immunologic: Negative  Neurological: Positive for weakness  Hematological: Negative  Psychiatric/Behavioral: Positive for decreased concentration  Family History    Family History   Problem Relation Age of Onset    Hodgkin's lymphoma Father     Cancer Brother        Social History    Social History     Social History    Marital status: /Civil Union     Spouse name: N/A    Number of children: N/A    Years of education: N/A     Occupational History    Not on file       Social History Main Topics    Smoking status: Former Smoker     Packs/day: 6 00     Years: 20 00     Quit date: 4/23/1978    Smokeless tobacco: Never Used    Alcohol use 1 2 oz/week     2 Glasses of wine per week      Comment: 3-4 times per week    Drug use: No    Sexual activity: No     Other Topics Concern    Not on file     Social History Narrative    No narrative on file       Past Medical History    Past Medical History:   Diagnosis Date    Allergic     Arthritis     Coronary artery disease     GERD (gastroesophageal reflux disease)     History of heart artery stent 2001    x2    History of transfusion 1988    Hydrocephalus     Hyperlipidemia     Hypertension     Obesity     Visual impairment        Surgical History    Past Surgical History:   Procedure Laterality Date    CARDIAC SURGERY      CATARACT EXTRACTION, BILATERAL Bilateral 2014    OK CREATE SHUNT:VENTRIC-PERITONEAL Right 7/11/2018    Procedure: FRONTAL VENTRICULOPERITONEAL SHUNT INSERTION;  Surgeon: Ana Laura Orozco MD;  Location: [facility];  Service: Neurosurgery    ROTATOR CUFF REPAIR Right 2016       Medications      Current Outpatient Prescriptions:     amLODIPine (NORVASC) 5 mg tablet, , Disp: , Rfl:     aspirin (ECOTRIN) 325 mg EC tablet, Take 325 mg by mouth every 6 (six) hours as needed for mild pain, Disp: , Rfl:     Cetirizine HCl (ZYRTEC ALLERGY) 10 MG CAPS, Take by mouth, Disp: , Rfl:     Cholecalciferol (VITAMIN D) 2000 units CAPS, Take by mouth, Disp: , Rfl:     cilostazol (PLETAL) 100 mg tablet, TAKE 1 TABLET TWICE A DAY, Disp: 180 tablet, Rfl: 3    Fluticasone Propionate (FLONASE NA), into each nostril, Disp: , Rfl:     losartan (COZAAR) 50 mg tablet, , Disp: , Rfl:     metoprolol succinate (TOPROL XL) 50 mg 24 hr tablet, Take by mouth, Disp: , Rfl:     Multiple Vitamin (MULTIVITAMIN) capsule, Take 1 capsule by mouth daily, Disp: , Rfl:     Omega-3 1000 MG CAPS, Take by mouth, Disp: , Rfl:     Omega-3 Fatty Acids (FISH OIL) 1,000 mg, Take 1,000 mg by mouth 2 (two) times a day, Disp: , Rfl:     psyllium (METAMUCIL) 58 6 % packet, Take 1 packet by mouth daily, Disp: , Rfl:     rosuvastatin (CRESTOR) 10 MG tablet, Take by mouth, Disp: , Rfl:     cephalexin (KEFLEX) 250 mg capsule, Keflex 250mg, take one tablet one hour prior to dental procedure and then take one tablet four times daily for one day following dental procedure, Disp: 5 capsule, Rfl: 0    Allergies    No Known Allergies    The following portions of the patient's history were reviewed and updated as appropriate: allergies, current medications, past family history, past medical history, past social history, past surgical history and problem list     Investigations    I personally reviewed the CT, NPH PT and NPH Cognitive results with the patient:    CT scan of the head without contrast dated August 6th, 2018  Comparison to previous imaging  Stable if appearance of right frontal  shunt catheter within the right frontal horn  No new intra-axial or extra-axial lesions  Overall stable examination  NPH scale dated 8/9/2018, 12/15  PT gait assessment dated 8/9/2018  TUG 20 sec , TUGc 27 sec, DGI 11/24     Patient notes that today's PT evaluation was undertaken without walker or cane and previous evaluations were undertaken with him using a walker  MoCA dated 8/9/2018, 28/30  Physical Exam    Vitals:  Blood pressure 128/76, temperature (!) 97 4 °F (36 3 °C), resp  rate 18, height 5' 7" (1 702 m), weight 95 3 kg (210 lb)  ,Body mass index is 32 89 kg/m²  Physical Exam   Constitutional: He is oriented to person, place, and time  He appears well-developed and well-nourished  Abdominal: Soft  He exhibits no distension  Neurological: He is alert and oriented to person, place, and time  No cranial nerve deficit  Walks with a steady mildly wide-based gait using a cane  No pronator or parietal drift  Skin: Skin is warm and dry  Shunt related incisions well healed  Mild scabbing over right frontal incision  No tenderness or swelling along shunt tract  Vitals reviewed  Neurologic Exam     Mental Status   Oriented to person, place, and time

## 2018-08-09 NOTE — LETTER
August 9, 2018     Keya Chinchilla  [address] [facility] [address]    Patient: Josh Estimjoni   YOB: 1935   Date of Visit: 8/9/2018       Dear Dr Leticia Benton: Thank you for referring Carlito Dempsey to me for evaluation  Below are my notes for this consultation  If you have questions, please do not hesitate to call me  I look forward to following your patient along with you  Sincerely,        Raul Chapman MD        CC: No Recipients  Raul Chapman MD  8/9/2018 11:55 AM  Sign at close encounter  Office Note - Neurosurgery   Josh Estimable 81 y o  male MRN: [id number]      Assessment:    Patient is rapidly improving  42-year-old gentleman post insertion of 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  He has had objective and subjective improvement in his gait and cognition since surgery  He is very pleased with his progress  He will continue to work with physical therapy  He will follow-up in 6 months time through the integrated normal pressure hydrocephalus program with PT and cognitive assessment  He will contact this office should any concerns arise in the interim  He was provided a prescription for Keflex in anticipation of the dental procedure next week  From a neurosurgical perspective, he can proceed with colonoscopy in September as planned  History, physical examination and diagnostic tests were reviewed and questions answered  Diagnosis, care plan and treatment options were discussed  The patient understand instructions and will follow up as directed  Plan:    Follow-up: in 6 month(s)    Problem List Items Addressed This Visit        Nervous and Auditory    Normal pressure hydrocephalus       Other    S/P  shunt      Other Visit Diagnoses     Status post surgery    -  Primary    Relevant Medications    cephalexin (KEFLEX) 250 mg capsule          Subjective/Objective     Chief Complaint    None      HPI    Six weeks post insertion of 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  The patient is noted significant improvement in his gait and balance since surgery  He no longer uses a walker and is able to ambulate with a cane  His cognition he feels has improved to some extent as well  He denies any difficulties with the incisions  Overall he is very pleased with the results of surgery and continues to actively participate with physical therapy on an outpatient basis  ROS    Review of Systems   HENT: Negative  Eyes: Negative  Respiratory: Negative  Cardiovascular: Negative  Gastrointestinal: Negative  Endocrine: Negative  Genitourinary: Negative  Musculoskeletal: Negative  Allergic/Immunologic: Negative  Neurological: Positive for weakness  Hematological: Negative  Psychiatric/Behavioral: Positive for decreased concentration  Family History    Family History   Problem Relation Age of Onset    Hodgkin's lymphoma Father     Cancer Brother        Social History    Social History     Social History    Marital status: /Civil Union     Spouse name: N/A    Number of children: N/A    Years of education: N/A     Occupational History    Not on file       Social History Main Topics    Smoking status: Former Smoker     Packs/day: 6 00     Years: 20 00     Quit date: 4/23/1978    Smokeless tobacco: Never Used    Alcohol use 1 2 oz/week     2 Glasses of wine per week      Comment: 3-4 times per week    Drug use: No    Sexual activity: No     Other Topics Concern    Not on file     Social History Narrative    No narrative on file       Past Medical History    Past Medical History:   Diagnosis Date    Allergic     Arthritis     Coronary artery disease     GERD (gastroesophageal reflux disease)     History of heart artery stent 2001    x2    History of transfusion 1988    Hydrocephalus     Hyperlipidemia     Hypertension     Obesity     Visual impairment        Surgical History    Past Surgical History:   Procedure Laterality Date    CARDIAC SURGERY      CATARACT EXTRACTION, BILATERAL Bilateral 2014    CO CREATE SHUNT:VENTRIC-PERITONEAL Right 7/11/2018    Procedure: FRONTAL VENTRICULOPERITONEAL SHUNT INSERTION;  Surgeon: Annelise Alan MD;  [facility];  Service: Neurosurgery    ROTATOR CUFF REPAIR Right 2016       Medications      Current Outpatient Prescriptions:     amLODIPine (NORVASC) 5 mg tablet, , Disp: , Rfl:     aspirin (ECOTRIN) 325 mg EC tablet, Take 325 mg by mouth every 6 (six) hours as needed for mild pain, Disp: , Rfl:     Cetirizine HCl (ZYRTEC ALLERGY) 10 MG CAPS, Take by mouth, Disp: , Rfl:     Cholecalciferol (VITAMIN D) 2000 units CAPS, Take by mouth, Disp: , Rfl:     cilostazol (PLETAL) 100 mg tablet, TAKE 1 TABLET TWICE A DAY, Disp: 180 tablet, Rfl: 3    Fluticasone Propionate (FLONASE NA), into each nostril, Disp: , Rfl:     losartan (COZAAR) 50 mg tablet, , Disp: , Rfl:     metoprolol succinate (TOPROL XL) 50 mg 24 hr tablet, Take by mouth, Disp: , Rfl:     Multiple Vitamin (MULTIVITAMIN) capsule, Take 1 capsule by mouth daily, Disp: , Rfl:     Omega-3 1000 MG CAPS, Take by mouth, Disp: , Rfl:     Omega-3 Fatty Acids (FISH OIL) 1,000 mg, Take 1,000 mg by mouth 2 (two) times a day, Disp: , Rfl:     psyllium (METAMUCIL) 58 6 % packet, Take 1 packet by mouth daily, Disp: , Rfl:     rosuvastatin (CRESTOR) 10 MG tablet, Take by mouth, Disp: , Rfl:     cephalexin (KEFLEX) 250 mg capsule, Keflex 250mg, take one tablet one hour prior to dental procedure and then take one tablet four times daily for one day following dental procedure, Disp: 5 capsule, Rfl: 0    Allergies    No Known Allergies    The following portions of the patient's history were reviewed and updated as appropriate: allergies, current medications, past family history, past medical history, past social history, past surgical history and problem list     Investigations    I personally reviewed the CT, NPH PT and NPH Cognitive results with the patient:    CT scan of the head without contrast dated August 6th, 2018  Comparison to previous imaging  Stable if appearance of right frontal  shunt catheter within the right frontal horn  No new intra-axial or extra-axial lesions  Overall stable examination  NPH scale dated 8/9/2018, 12/15  PT gait assessment dated 8/9/2018  TUG 20 sec , TUGc 27 sec, DGI 11/24   Patient notes that today's PT evaluation was undertaken without walker or cane and previous evaluations were undertaken with him using a walker  MoCA dated 8/9/2018, 28/30  Physical Exam    Vitals:  Blood pressure 128/76, temperature (!) 97 4 °F (36 3 °C), resp  rate 18, height 5' 7" (1 702 m), weight 95 3 kg (210 lb)  ,Body mass index is 32 89 kg/m²  Physical Exam   Constitutional: He is oriented to person, place, and time  He appears well-developed and well-nourished  Abdominal: Soft  He exhibits no distension  Neurological: He is alert and oriented to person, place, and time  No cranial nerve deficit  Walks with a steady mildly wide-based gait using a cane  No pronator or parietal drift  Skin: Skin is warm and dry  Shunt related incisions well healed  Mild scabbing over right frontal incision  No tenderness or swelling along shunt tract  Vitals reviewed  Neurologic Exam     Mental Status   Oriented to person, place, and time

## 2018-08-16 ENCOUNTER — OFFICE VISIT (OUTPATIENT)
Dept: PHYSICAL THERAPY | Facility: CLINIC | Age: 83
End: 2018-08-16
Payer: COMMERCIAL

## 2018-08-16 DIAGNOSIS — R26.9 GAIT DISTURBANCE: Primary | ICD-10-CM

## 2018-08-16 PROCEDURE — 97110 THERAPEUTIC EXERCISES: CPT | Performed by: PHYSICAL THERAPIST

## 2018-08-16 PROCEDURE — 97112 NEUROMUSCULAR REEDUCATION: CPT | Performed by: PHYSICAL THERAPIST

## 2018-08-16 NOTE — PROGRESS NOTES
Daily Note     Today's date: 2018  Patient name: Joshua Gambino  : 1935  MRN: [id number]  Referring provider: Vance Burns MD  Dx:   Encounter Diagnosis     ICD-10-CM    1  Gait disturbance R26 9                   Subjective: Pt notes that he is doing well today and denies any falls  Objective: See treatment diary below      Assessment: Tolerated treatment well  Patient demonstrated fatigue post treatment, exhibited good technique with therapeutic exercises, would benefit from continued PT and was most challenged with NBOS on foam, along with side stepping on foam  Will continue to progress with obsticle navigation  He required minimal cueing during ambulation without his cane to maintain an appropraite step/stride length  Plan: Continue per plan of care  Progress treatment as tolerated          Precautions: Hx of stents, NPH w/shunt placement, fall risk, HTN    Daily Treatment Diary     Manual                                                                                   Exercise Diary             bike  6'           Repeated sit to stand  20x foam          Side stepping on foam  4 laps           Heel raises  20x           Standing hip 3 way  15x ea           Static stance on foam  NBOS 30"x3           Tandem stance  30"x2 ea           Cone step over  4 laps           Gait training w/obstacle navigation  3 laps           Cone weaving  NV           SLS  NV                                                                                                                                    Modalities

## 2018-08-20 ENCOUNTER — OFFICE VISIT (OUTPATIENT)
Dept: PHYSICAL THERAPY | Facility: CLINIC | Age: 83
End: 2018-08-20
Payer: COMMERCIAL

## 2018-08-20 DIAGNOSIS — R26.9 GAIT DISTURBANCE: Primary | ICD-10-CM

## 2018-08-20 PROCEDURE — 97110 THERAPEUTIC EXERCISES: CPT

## 2018-08-20 PROCEDURE — 97112 NEUROMUSCULAR REEDUCATION: CPT

## 2018-08-20 NOTE — PROGRESS NOTES
Daily Note     Today's date: 2018  Patient name: Gladys Gambino  : 1935  MRN: [id number]  Referring provider: Mariaelena Layne MD  Dx:   Encounter Diagnosis     ICD-10-CM    1  Gait disturbance R26 9                   Subjective: Patient denies any recent falls  Continues to use quad cane at all times  Objective: See treatment diary below  Precautions: Hx of stents, NPH w/shunt placement, fall risk, HTN    Daily Treatment Diary     Manual                                                                                   Exercise Diary            bike  6' 6 min          Repeated sit to stand  20x FOAM x 20           Side stepping on foam  4 laps 4 laps          Heel raises  20x 20x          Standing hip 3 way  15x ea 1 5# x 15          Static stance on foam  NBOS 30"x3 NBOS :30x3          Tandem stance  30"x2 ea 30" x 2 ea          Cone step over  4 laps 4 laps          Gait training w/obstacle navigation  3 laps 3 laps          Cone weaving  NV 3 laps          SLS  NV :10x10          Biodex   LOS, random lvl 11 x 2 ea          Wobble board balance   AP,ML x20 ea                                                                                                         Modalities                                                         Assessment: Tolerated treatment well  Patient exhibited good technique with therapeutic exercises  Patient able to progress with Biodex, wobble board balance and SLS balance training with good tolerance  Plan: Continue per plan of care

## 2018-08-23 ENCOUNTER — OFFICE VISIT (OUTPATIENT)
Dept: PHYSICAL THERAPY | Facility: CLINIC | Age: 83
End: 2018-08-23
Payer: COMMERCIAL

## 2018-08-23 DIAGNOSIS — R26.9 GAIT DISTURBANCE: Primary | ICD-10-CM

## 2018-08-23 PROCEDURE — 97112 NEUROMUSCULAR REEDUCATION: CPT

## 2018-08-23 PROCEDURE — 97110 THERAPEUTIC EXERCISES: CPT

## 2018-08-23 NOTE — PROGRESS NOTES
Daily Note     Today's date: 2018  Patient name: Elder Iqbal  : 1935  MRN: [id number]  Referring provider: Stormy Graves MD  Dx:   Encounter Diagnosis     ICD-10-CM    1  Gait disturbance R26 9          Subjective: Patient noted no new complaints  Objective: See treatment diary below      Assessment: Tolerated treatment fair  Patient presented with LOB with sit->stands on foam self corrected with assist from therapist  Patient exteremly challenged performing sit -  stands with foam  Patient needed UE assist x5 reps rest of reps performed without foam and no HHA  Patient would benefit from continued PT        Plan: Continue per plan of care     Precautions: Hx of stents, NPH w/shunt placement, fall risk, HTN     Daily Treatment Diary      Manual                                                                                                                                                      Exercise Diary                 bike   6' 6 min  6 min               Repeated sit to stand   20x FOAM x 20   foamx5 no foam x15               Side stepping on foam   4 laps 4 laps  4 laps               Heel raises   20x 20x  20x               Standing hip 3 way   15x ea 1 5# x 15  1 5# x 15               Static stance on foam   NBOS 30"x3 NBOS :30x3  NBOS :30x3               Tandem stance   30"x2 ea 30" x 2 ea  30" x 2 ea               Cone step over   4 laps 4 laps  4 laps               Gait training w/obstacle navigation   3 laps 3 laps  3 laps               Cone weaving   NV 3 laps  3 laps               SLS   NV :10x10  :10x10               Biodex     LOS, random lvl 11 x 2 ea  LOS, random lvl 11 x 2 ea               Wobble board balance     AP,ML x20 ea  AP,ML x20 ea                                                                                                                                                                                             Modalities

## 2018-08-27 ENCOUNTER — APPOINTMENT (OUTPATIENT)
Dept: PHYSICAL THERAPY | Facility: CLINIC | Age: 83
End: 2018-08-27
Payer: COMMERCIAL

## 2018-08-28 ENCOUNTER — OFFICE VISIT (OUTPATIENT)
Dept: PHYSICAL THERAPY | Facility: CLINIC | Age: 83
End: 2018-08-28
Payer: COMMERCIAL

## 2018-08-28 DIAGNOSIS — R26.9 GAIT DISTURBANCE: Primary | ICD-10-CM

## 2018-08-28 PROCEDURE — 97112 NEUROMUSCULAR REEDUCATION: CPT

## 2018-08-28 PROCEDURE — 97110 THERAPEUTIC EXERCISES: CPT

## 2018-08-28 NOTE — PROGRESS NOTES
Daily Note     Today's date: 2018  Patient name: Qasim Wilson  : 1935  MRN: [id number]  Referring provider: Lyle Gresham MD  Dx:   Encounter Diagnosis     ICD-10-CM    1  Gait disturbance R26 9                   Subjective: Patient reports nothing new this visit  States his balance is still bad  Objective: See treatment diary below      Assessment: Good tolerance to progression of balance TE  Had difficulty with side stepping over cones  No significant LOB noted today  Used gait belt for balance activities  Plan: Continue per plan of care       Precautions: Hx of stents, NPH w/shunt placement, fall risk, HTN     Daily Treatment Diary      Manual                                                                                                                                                      Exercise Diary               bike   6' 6 min  6 min  6 min             Repeated sit to stand   20x FOAM x 20   foamx5 no foam x15               Side stepping on foam   4 laps 4 laps  4 laps  4 laps             Heel raises   20x 20x  20x  20x             Standing hip 3 way   15x ea 1 5# x 15  1 5# x 15  1 5# 20x ea             Static stance on foam   NBOS 30"x3 NBOS :30x3  NBOS :30x3  NBOS EC :30 x3             Tandem stance   30"x2 ea 30" x 2 ea  30" x 2 ea  30" x2 ea on foam             Cone step over   4 laps 4 laps  4 laps  4 laps             Gait training w/obstacle navigation   3 laps 3 laps  3 laps  3 laps             Cone weaving   NV 3 laps  3 laps  3 laps             SLS   NV :10x10  :10x10  :10x10             Biodex     LOS, random lvl 11 x 2 ea  LOS, random lvl 11 x 2 ea   LOS, random lvl 11 x 2 ea             Wobble board balance     AP,ML x20 ea  AP,ML x20 ea  AP,ML x20 ea                                                                                                                                                                                         Modalities

## 2018-08-30 ENCOUNTER — OFFICE VISIT (OUTPATIENT)
Dept: PHYSICAL THERAPY | Facility: CLINIC | Age: 83
End: 2018-08-30
Payer: COMMERCIAL

## 2018-08-30 DIAGNOSIS — R26.9 GAIT DISTURBANCE: Primary | ICD-10-CM

## 2018-08-30 PROCEDURE — 97110 THERAPEUTIC EXERCISES: CPT

## 2018-08-30 PROCEDURE — 97112 NEUROMUSCULAR REEDUCATION: CPT

## 2018-08-30 NOTE — PROGRESS NOTES
Daily Note     Today's date: 2018  Patient name: Raegan Del Rio  : 1935  MRN: [id number]  Referring provider: Alcon Graham MD  Dx:   Encounter Diagnosis     ICD-10-CM    1  Gait disturbance R26 9                   Subjective: Patient states that he has not felt a significant  improvement with his balance and feels his balance is the worst when in small spaces as he is using a shuffling pattern      Objective: See treatment diary below  Precautions: Hx of stents, NPH w/shunt placement, fall risk, HTN     Daily Treatment Diary      Manual                                                                                                                                                      Exercise Diary             bike   6' 6 min  6 min  6 min  6 min            Repeated sit to stand   20x FOAM x 20   foamx5 no foam x15   2x10           Side stepping on foam   4 laps 4 laps  4 laps  4 laps  4 laps           Heel raises   20x 20x  20x  20x  20x           Standing hip 3 way   15x ea 1 5# x 15  1 5# x 15  1 5# 20x ea  1 5# x 20           Static stance on foam   NBOS 30"x3 NBOS :30x3  NBOS :30x3  NBOS EC :30 x3  NBOS :30 x3           Tandem stance   30"x2 ea 30" x 2 ea  30" x 2 ea  30" x2 ea on foam  :30x 3 FOAM           Cone step over   4 laps 4 laps  4 laps  4 laps  4 laps           Gait training w/obstacle navigation   3 laps 3 laps  3 laps  3 laps  3 laps           Cone weaving   NV 3 laps  3 laps  3 laps             SLS   NV :10x10  :10x10  :10x10  ;10x10           Biodex     LOS, random lvl 11 x 2 ea  LOS, random lvl 11 x 2 ea   LOS, random lvl 11 x 2 ea  LOS,random lvl 11 x 2           Wobble board balance     AP,ML x20 ea  AP,ML x20 ea  AP,ML x20 ea Error - NV            Cone side step over           3 laps                                                                                                                                                               Modalities                                                                                                        Assessment: Tolerated treatment well  Patient exhibited good technique with therapeutic exercises      Plan: Continue per plan of care

## 2018-09-04 ENCOUNTER — OFFICE VISIT (OUTPATIENT)
Dept: PHYSICAL THERAPY | Facility: CLINIC | Age: 83
End: 2018-09-04
Payer: COMMERCIAL

## 2018-09-04 DIAGNOSIS — R26.9 GAIT DISTURBANCE: Primary | ICD-10-CM

## 2018-09-04 PROCEDURE — 97112 NEUROMUSCULAR REEDUCATION: CPT | Performed by: PHYSICAL THERAPIST

## 2018-09-04 PROCEDURE — 97110 THERAPEUTIC EXERCISES: CPT | Performed by: PHYSICAL THERAPIST

## 2018-09-04 NOTE — PROGRESS NOTES
Daily Note     Today's date: 2018  Patient name: Merlinda Saner  : 1935  MRN: [id number]  Referring provider: Homero Chew MD  Dx:   Encounter Diagnosis     ICD-10-CM    1  Gait disturbance R26 9        Start Time: 1100  Stop Time: 1140  Total time in clinic (min): 40 minutes    Subjective: "Today is an unsteady day for me "    Objective: See treatment diary below  Precautions: Hx of stents, NPH w/shunt placement, fall risk, HTN     Daily Treatment Diary      Manual                                                                                                                                                      Exercise Diary           bike   6' 6 min  6 min  6 min  6 min   6 min         Repeated sit to stand   20x FOAM x 20   foamx5 no foam x15   2x10  2x10         Side stepping on foam   4 laps 4 laps  4 laps  4 laps  4 laps  4 laps         Heel raises   20x 20x  20x  20x  20x  20x         Standing hip 3 way   15x ea 1 5# x 15  1 5# x 15  1 5# 20x ea  1 5# x 20  1 5# x20         Static stance on foam   NBOS 30"x3 NBOS :30x3  NBOS :30x3  NBOS EC :30 x3  NBOS :30 x3  NBOS  :30x3         Tandem stance   30"x2 ea 30" x 2 ea  30" x 2 ea  30" x2 ea on foam  :30x 3 FOAM :30x  3 FOAM         Cone step over   4 laps 4 laps  4 laps  4 laps  4 laps  4 laps         Gait training w/obstacle navigation   3 laps 3 laps  3 laps  3 laps  3 laps           Cone weaving   NV 3 laps  3 laps  3 laps    3 laps         SLS   NV :10x10  :10x10  :10x10  ;10x10  :10x10         Biodex     LOS, random lvl 11 x 2 ea  LOS, random lvl 11 x 2 ea   LOS, random lvl 11 x 2 ea  LOS,random lvl 11 x 2  LOS, random lvl 11 x2         Wobble board balance     AP,ML x20 ea  AP,ML x20 ea  AP,ML x20 ea Error - NV            Cone side step over           3 laps  3 laps                                                                                                                                                               Modalities                                                                                                        Assessment:  Patient exhibited good technique with therapeutic exercises  Some rest breaks taken with gait  Plan: Continue per plan of care

## 2018-09-06 ENCOUNTER — EVALUATION (OUTPATIENT)
Dept: PHYSICAL THERAPY | Facility: CLINIC | Age: 83
End: 2018-09-06
Payer: COMMERCIAL

## 2018-09-06 DIAGNOSIS — R26.9 GAIT DISTURBANCE: Primary | ICD-10-CM

## 2018-09-06 DIAGNOSIS — Z98.2 S/P VP SHUNT: ICD-10-CM

## 2018-09-06 PROCEDURE — 97112 NEUROMUSCULAR REEDUCATION: CPT

## 2018-09-06 PROCEDURE — 97110 THERAPEUTIC EXERCISES: CPT

## 2018-09-06 PROCEDURE — G8979 MOBILITY GOAL STATUS: HCPCS | Performed by: PHYSICAL THERAPIST

## 2018-09-06 PROCEDURE — G8978 MOBILITY CURRENT STATUS: HCPCS | Performed by: PHYSICAL THERAPIST

## 2018-09-06 NOTE — PROGRESS NOTES
Daily Note     Today's date: 2018  Patient name: Qasim Wilson  : 1935  MRN: [id number]  Referring provider: Lyle Gresham MD  Dx:   Encounter Diagnosis     ICD-10-CM    1  Gait disturbance R26 9                   Subjective: Refer to RE      Objective: See treatment diary below  Precautions: Hx of stents, NPH w/shunt placement, fall risk, HTN     Daily Treatment Diary      Manual                                                                                                                                                      Exercise Diary         bike   6' 6 min  6 min  6 min  6 min   6 min  6 min       Repeated sit to stand   20x FOAM x 20   foamx5 no foam x15   2x10  2x10  2x10       Side stepping on foam   4 laps 4 laps  4 laps  4 laps  4 laps  4 laps  4 laps        Heel raises   20x 20x  20x  20x  20x  20x  20       Standing hip 3 way   15x ea 1 5# x 15  1 5# x 15  1 5# 20x ea  1 5# x 20  1 5# x20  x20       Static stance on foam   NBOS 30"x3 NBOS :30x3  NBOS :30x3  NBOS EC :30 x3  NBOS :30 x3  NBOS  :30x3 NBOS :30x3       Tandem stance   30"x2 ea 30" x 2 ea  30" x 2 ea  30" x2 ea on foam  :30x 3 FOAM :30x  3 FOAM  30x3 FOAM       Cone step over   4 laps 4 laps  4 laps  4 laps  4 laps  4 laps  4 laps       Gait training w/obstacle navigation   3 laps 3 laps  3 laps  3 laps  3 laps    3 laps       Cone weaving   NV 3 laps  3 laps  3 laps    3 laps  3 laps       SLS   NV :10x10  :10x10  :10x10  ;10x10  :10x10  :10x10       Biodex     LOS, random lvl 11 x 2 ea  LOS, random lvl 11 x 2 ea   LOS, random lvl 11 x 2 ea  LOS,random lvl 11 x 2  LOS, random lvl 11 x2  LOS,random lvl 11 x 2       Wobble board balance     AP,ML x20 ea  AP,ML x20 ea  AP,ML x20 ea Error - NV AP/MLx 20  AP/MLx 20        Cone side step over           3 laps  3 laps  4 laps        Walking with head turns               2 laps                                                                                                                                     Modalities                                                                                                          Assessment: Tolerated treatment fair  Patient exhibited good technique with therapeutic exercises      Plan: Continue per plan of care

## 2018-09-06 NOTE — PROGRESS NOTES
PT Re-Evaluation     Today's date: 2018  Patient name: Jordy Lovett  : 1935  MRN: [id number]  Referring provider: Janett Cardona MD  Dx:   Encounter Diagnosis     ICD-10-CM    1  Gait disturbance R26 9                   Assessment  Impairments: abnormal coordination, abnormal gait, abnormal muscle firing, abnormal or restricted ROM, abnormal movement, activity intolerance, difficulty understanding, impaired balance, impaired physical strength, lacks appropriate home exercise program, pain with function, safety issue, weight-bearing intolerance and poor posture     Assessment details: Jordy Lovett has attended 9 visits since initiating skilled PT and has demonstrated overall improvement in his balance and gait measurements  He does report an increased sense of unsteadiness, and was educated at length regarding his current progression and expectations  Currently, they have made steady progress towards their goals, but continue to remain limited with decreased strength, challenges with obstacle navigation and challenges with turns  At this time, skilled physical therapy is warranted in order to address their remaining impairments and aide in return to functional activities  It is recommended that they continue to be seen 2x/week for an additional 4 weeks  Thank you for this pleasant referral!     Understanding of Dx/Px/POC: good   Prognosis: fair    Goals  Short Term Goals: to be achieved in 4 weeks  1) Patient to be independent with basic HEP - MET  2) Increase LE strength by 1/2 MMT grade in all deficient planes  - MET  3) Pt will demonstrate improved TUG score by 3 seconds- PARTIALLY MET    Long Term Goals: to be achieved by discharge  1) FOTO equal to or greater projected score - NOT MET  2) Patient to be independent with comprehensive HEP - NOT MET  3) Patient will be able to descend stairs reciprocally with use of 1 UE hand rails  -NOT MET  4) Pt will demonstrate improved DGI score by at least 3 points- MET    Plan  Patient would benefit from: skilled PT  Referral necessary: No  Planned modality interventions: biofeedback, cryotherapy, hydrotherapy, TENS and unattended electrical stimulation  Planned therapy interventions: activity modification, ADL retraining, balance, balance/weight bearing training, behavior modification, body mechanics training, functional ROM exercises, gait training, home exercise program, IADL retraining, joint mobilization, manual therapy, massage, neuromuscular re-education, patient education, strengthening, stretching, therapeutic activities, therapeutic exercise, transfer training, graded exercise and postural training  Frequency: 2x week  Duration in weeks: 4  Plan of Care beginning date: 9/6/2018  Plan of Care expiration date: 10/4/2018  Treatment plan discussed with: patient        Subjective Evaluation    History of Present Illness  Date of surgery: 7/11/2018  Mechanism of injury: Pt states that he feels that his balance seems to be declining and he is feeling more and more unsteady  He denies any changes otherwise  He is still better overall then he was prior to his shunt placement as he is utilizing a cane instead of a walker for ambulation  Recurrent probem    Quality of life: good    Pain  No pain reported    Patient Goals  Patient goal: Be able to walk unassisted- and no longer require a SPC  Objective     Ambulation     Comments   Gait- decreased step height and step length, decreased foot clearance and slowed speed  Ambulates with NBQC   Shuffling gait in tight spaces and when navigating obstacles  TUG- 19 47 sec, no AD used, most challenge with turns- 9/6/18- 17 4 sec, challenge with turns remain  Cognitive TUG-26 54; required a few seconds upon standing prior to initiating gait- 9/6/18 20 5 sec, errors in counting   DGI- 11/24 no AD used- 9/6/18- 14/24 no AD  Stairs- alternating ascending w/1 rail, 1 at a time descending, use of  sary hand rails, one at a time  Retropulsion- negative     LE strength- 4+/5 t/o sary LE's      Flowsheet Rows      Most Recent Value   PT/OT G-Codes   FOTO information reviewed  N/A   Assessment Type  Re-evaluation   G code set  Mobility: Walking & Moving Around   Mobility: Walking and Moving Around Current Status ()  CK   Mobility: Walking and Moving Around Goal Status ()  CJ

## 2018-09-11 ENCOUNTER — APPOINTMENT (OUTPATIENT)
Dept: PHYSICAL THERAPY | Facility: CLINIC | Age: 83
End: 2018-09-11
Payer: COMMERCIAL

## 2018-09-13 ENCOUNTER — APPOINTMENT (OUTPATIENT)
Dept: PHYSICAL THERAPY | Facility: CLINIC | Age: 83
End: 2018-09-13
Payer: COMMERCIAL

## 2018-09-18 ENCOUNTER — APPOINTMENT (OUTPATIENT)
Dept: PHYSICAL THERAPY | Facility: CLINIC | Age: 83
End: 2018-09-18
Payer: COMMERCIAL

## 2018-09-20 ENCOUNTER — OFFICE VISIT (OUTPATIENT)
Dept: PHYSICAL THERAPY | Facility: CLINIC | Age: 83
End: 2018-09-20
Payer: COMMERCIAL

## 2018-09-20 DIAGNOSIS — Z98.2 S/P VP SHUNT: ICD-10-CM

## 2018-09-20 DIAGNOSIS — R26.9 GAIT DISTURBANCE: Primary | ICD-10-CM

## 2018-09-20 PROCEDURE — 97112 NEUROMUSCULAR REEDUCATION: CPT

## 2018-09-20 PROCEDURE — 97110 THERAPEUTIC EXERCISES: CPT

## 2018-09-25 ENCOUNTER — OFFICE VISIT (OUTPATIENT)
Dept: PHYSICAL THERAPY | Facility: CLINIC | Age: 83
End: 2018-09-25
Payer: COMMERCIAL

## 2018-09-25 DIAGNOSIS — Z98.2 S/P VP SHUNT: ICD-10-CM

## 2018-09-25 DIAGNOSIS — R26.9 GAIT DISTURBANCE: Primary | ICD-10-CM

## 2018-09-25 PROCEDURE — 97112 NEUROMUSCULAR REEDUCATION: CPT | Performed by: PHYSICAL THERAPIST

## 2018-09-25 PROCEDURE — 97110 THERAPEUTIC EXERCISES: CPT | Performed by: PHYSICAL THERAPIST

## 2018-09-25 NOTE — PROGRESS NOTES
Daily Note     Today's date: 2018  Patient name: Alana Allan  : 1935  MRN: [id number]  Referring provider: Jacinda Appiah MD  Dx:   Encounter Diagnosis     ICD-10-CM    1  Gait disturbance R26 9    2  S/P  shunt Z98 2                   Subjective: Pt reports that he is doing well today  He denies any falls over the past few days  Objective: See treatment diary below      Assessment: Tolerated treatment fair  Patient demonstrated fatigue post treatment, exhibited good technique with therapeutic exercises, would benefit from continued PT and is most challenged with compliant surfaces and SLS activities  Had patient perform cone step overs without UE assistance today with good tolerance  Encouraged patient to increase his stride length during gait  Plan: Continue per plan of care  Progress treatment as tolerated        Precautions: Hx of stents, NPH w/shunt placement, fall risk, HTN     Daily Treatment Diary      Manual                                                                                                                                                      Exercise Diary     bike   6' 6 min  6 min  6 min  6 min   6 min  6 min  6 min   6'   Repeated sit to stand   20x FOAM x 20   foamx5 no foam x15   2x10  2x10  2x10  2 x 10   2x10 foam   Side stepping on foam   4 laps 4 laps  4 laps  4 laps  4 laps  4 laps  4 laps  5 laps  5 laps   Heel raises   20x 20x  20x  20x  20x  20x  20  20  20x   Standing hip 3 way   15x ea 1 5# x 15  1 5# x 15  1 5# 20x ea  1 5# x 20  1 5# x20  x20  1 5# x 20   1 5# 20x   Static stance on foam   NBOS 30"x3 NBOS :30x3  NBOS :30x3  NBOS EC :30 x3  NBOS :30 x3  NBOS  :30x3 NBOS :30x3  NBOS :30x3  NBOS 30"x3   Tandem stance   30"x2 ea 30" x 2 ea  30" x 2 ea  30" x2 ea on foam  :30x 3 FOAM :30x  3 FOAM  30x3 FOAM  :30x3  30"x3 foam   Cone step over   4 laps 4 laps  4 laps  4 laps  4 laps  4 laps  4 laps  5 laps  5 laps   Gait training w/obstacle navigation   3 laps 3 laps  3 laps  3 laps  3 laps    3 laps  2 laps  3 laps   Cone weaving   NV 3 laps  3 laps  3 laps    3 laps  3 laps  2 laps  3 laps   SLS   NV :10x10  :10x10  :10x10  ;10x10  :10x10  :10x10 :10x5 FOAM  10"x5 foam   Biodex     LOS, random lvl 11 x 2 ea  LOS, random lvl 11 x 2 ea   LOS, random lvl 11 x 2 ea  LOS,random lvl 11 x 2  LOS, random lvl 11 x2  LOS,random lvl 11 x 2 LOS,random lvl 11 x 2  LOS, random lvl 11 x2   Wobble board balance     AP,ML x20 ea  AP,ML x20 ea  AP,ML x20 ea Error - NV AP/MLx 20  AP/MLx 20  AP/ML x 20   AP/ML 20x    Cone side step over           3 laps  3 laps  4 laps  4 laps  4 laps    Walking with head turns               2 laps  2 laps  4 laps                                                                                                                                 Modalities

## 2018-09-27 ENCOUNTER — OFFICE VISIT (OUTPATIENT)
Dept: PHYSICAL THERAPY | Facility: CLINIC | Age: 83
End: 2018-09-27
Payer: COMMERCIAL

## 2018-09-27 DIAGNOSIS — R26.9 GAIT DISTURBANCE: Primary | ICD-10-CM

## 2018-09-27 PROCEDURE — 97112 NEUROMUSCULAR REEDUCATION: CPT | Performed by: PHYSICAL THERAPIST

## 2018-09-27 PROCEDURE — 97110 THERAPEUTIC EXERCISES: CPT | Performed by: PHYSICAL THERAPIST

## 2018-09-27 NOTE — PROGRESS NOTES
Daily Note     Today's date: 2018  Patient name: Veronica Roberson  : 1935  MRN: [id number]  Referring provider: Brian Vidales MD  Dx:   Encounter Diagnosis     ICD-10-CM    1  Gait disturbance R26 9                   Subjective: Pt notes that he is doing well today with minimal complaints  Objective: See treatment diary below      Assessment: Tolerated treatment well  Patient demonstrated fatigue post treatment, would benefit from continued PT and demonstrated improved postural stability during TE/NRE today, particularly cone step over  Plan: Continue per plan of care  Progress treatment as tolerated          Precautions: Hx of stents, NPH w/shunt placement, fall risk, HTN     Daily Treatment Diary      Manual                                                                                                                                                      Exercise Diary     bike  6' 6' 6 min  6 min  6 min  6 min   6 min  6 min  6 min   6'   Repeated sit to stand  foam 20x 20x FOAM x 20   foamx5 no foam x15   2x10  2x10  2x10  2 x 10   2x10 foam   Side stepping on foam  4 laps 4 laps 4 laps  4 laps  4 laps  4 laps  4 laps  4 laps  5 laps  5 laps   Heel raises  20x 20x 20x  20x  20x  20x  20x  20  20  20x   Standing hip 3 way  1 5# 20x 15x ea 1 5# x 15  1 5# x 15  1 5# 20x ea  1 5# x 20  1 5# x20  x20  1 5# x 20   1 5# 20x   Static stance on foam  NBOS30"x3 NBOS 30"x3 NBOS :30x3  NBOS :30x3  NBOS EC :30 x3  NBOS :30 x3  NBOS  :30x3 NBOS :30x3  NBOS :30x3  NBOS 30"x3   Tandem stance  foam 30"x3 30"x2 ea 30" x 2 ea  30" x 2 ea  30" x2 ea on foam  :30x 3 FOAM :30x  3 FOAM  30x3 FOAM  :30x3  30"x3 foam   Cone step over  5 laps 4 laps 4 laps  4 laps  4 laps  4 laps  4 laps  4 laps  5 laps  5 laps   Gait training w/obstacle navigation  3 laps 3 laps 3 laps  3 laps  3 laps  3 laps    3 laps  2 laps  3 laps   Cone weaving  3 laps NV 3 laps  3 laps  3 laps    3 laps  3 laps  2 laps  3 laps   SLS  foam 10"x5 NV :10x10  :10x10  :10x10  ;10x10  :10x10  :10x10 :10x5 FOAM  10"x5 foam   Biodex  LOS random lvl 11 x2   LOS, random lvl 11 x 2 ea  LOS, random lvl 11 x 2 ea   LOS, random lvl 11 x 2 ea  LOS,random lvl 11 x 2  LOS, random lvl 11 x2  LOS,random lvl 11 x 2 LOS,random lvl 11 x 2  LOS, random lvl 11 x2   Wobble board balance  AP/ML 20x   AP,ML x20 ea  AP,ML x20 ea  AP,ML x20 ea Error - NV AP/MLx 20  AP/MLx 20  AP/ML x 20   AP/ML 20x    Cone side step over  4 laps         3 laps  3 laps  4 laps  4 laps  4 laps    Walking with head turns  4 laps             2 laps  2 laps  4 laps                                                                                                                                 Modalities

## 2018-10-02 ENCOUNTER — OFFICE VISIT (OUTPATIENT)
Dept: PHYSICAL THERAPY | Facility: CLINIC | Age: 83
End: 2018-10-02
Payer: COMMERCIAL

## 2018-10-02 DIAGNOSIS — Z98.2 S/P VP SHUNT: ICD-10-CM

## 2018-10-02 DIAGNOSIS — R26.9 GAIT DISTURBANCE: Primary | ICD-10-CM

## 2018-10-02 PROCEDURE — 97110 THERAPEUTIC EXERCISES: CPT

## 2018-10-02 PROCEDURE — 97112 NEUROMUSCULAR REEDUCATION: CPT

## 2018-10-02 NOTE — PROGRESS NOTES
Daily Note     Today's date: 10/2/2018  Patient name: Vilma Mcmahan  : 1935  MRN: [id number]  Referring provider: Dakotah Hidalgo MD  Dx:   Encounter Diagnosis     ICD-10-CM    1  Gait disturbance R26 9    2   S/P  shunt Z98 2                   Subjective: Patient states that he has noticed an improvement with his balance and endurance      Objective: See treatment diary below    Precautions: Hx of stents, NPH w/shunt placement, fall risk, HTN     Daily Treatment Diary      Manual                                                                                                                                                      Exercise Diary  9/27 10/2 8/20  8/23  8/28  8/30  9/4  9/6 9/20  9/25   bike  6' 7 6 min  6 min  6 min  6 min   6 min  6 min  6 min   6'   Repeated sit to stand  foam 20x Foam 2 x 10  FOAM x 20   foamx5 no foam x15   2x10  2x10  2x10  2 x 10   2x10 foam   Side stepping on foam  4 laps 4 laps 4 laps  4 laps  4 laps  4 laps  4 laps  4 laps  5 laps  5 laps   Heel raises  20x 20x 20x  20x  20x  20x  20x  20  20  20x   Standing hip 3 way  1 5# 20x 1 5# x 20 1 5# x 15  1 5# x 15  1 5# 20x ea  1 5# x 20  1 5# x20  x20  1 5# x 20   1 5# 20x   Static stance on foam  NBOS30"x3 NBOS 30"x 3  NBOS :30x3  NBOS :30x3  NBOS EC :30 x3  NBOS :30 x3  NBOS  :30x3 NBOS :30x3  NBOS :30x3  NBOS 30"x3   Tandem stance  foam 30"x3  30" x 2 ea  30" x 2 ea  30" x2 ea on foam  :30x 3 FOAM :30x  3 FOAM  30x3 FOAM  :30x3  30"x3 foam   Cone step over  5 laps 5 lap ss 4 laps  4 laps  4 laps  4 laps  4 laps  4 laps  5 laps  5 laps   Gait training w/obstacle navigation  3 laps  3 laps  3 laps  3 laps  3 laps    3 laps  2 laps  3 laps   Cone weaving  3 laps 3 laps 3 laps  3 laps  3 laps    3 laps  3 laps  2 laps  3 laps   SLS  foam 10"x5 Foam:10x10  :10x10  :10x10  :10x10  ;10x10  :10x10  :10x10 :10x5 FOAM  10"x5 foam   Biodex  LOS random lvl 11 x2 LOS/random lvl 11 x 2 LOS, random lvl 11 x 2 ea  LOS, random lvl 11 x 2 ea   LOS, random lvl 11 x 2 ea  LOS,random lvl 11 x 2  LOS, random lvl 11 x2  LOS,random lvl 11 x 2 LOS,random lvl 11 x 2  LOS, random lvl 11 x2   Wobble board balance  AP/ML 20x APML x 20  AP,ML x20 ea  AP,ML x20 ea  AP,ML x20 ea Error - NV AP/MLx 20  AP/MLx 20  AP/ML x 20   AP/ML 20x    Cone side step over  4 laps 4 laps       3 laps  3 laps  4 laps  4 laps  4 laps    Walking with head turns  4 laps 4 laps            2 laps  2 laps  4 laps    Leg press   105# x 20                                                                                                                     Modalities                                                                                                  Assessment: Tolerated treatment fair  Patient exhibited good technique with therapeutic exercises      Plan: Continue per plan of care

## 2018-10-04 ENCOUNTER — OFFICE VISIT (OUTPATIENT)
Dept: PHYSICAL THERAPY | Facility: CLINIC | Age: 83
End: 2018-10-04
Payer: COMMERCIAL

## 2018-10-04 DIAGNOSIS — R26.9 GAIT DISTURBANCE: Primary | ICD-10-CM

## 2018-10-04 DIAGNOSIS — Z98.2 S/P VP SHUNT: ICD-10-CM

## 2018-10-04 PROCEDURE — 97110 THERAPEUTIC EXERCISES: CPT

## 2018-10-04 PROCEDURE — 97112 NEUROMUSCULAR REEDUCATION: CPT

## 2018-10-04 NOTE — PROGRESS NOTES
Daily Note     Today's date: 10/4/2018  Patient name: Elidia Waite  : 1935  MRN: [id number]  Referring provider: Roxy Morales MD  Dx:   Encounter Diagnosis     ICD-10-CM    1  Gait disturbance R26 9    2  S/P  shunt Z98 2                   Subjective: No recent falls  Patient trying to use cane less but feels unbalanced when transfers from sit to stand         Objective: See treatment diary below  Precautions: Hx of stents, NPH w/shunt placement, fall risk, HTN     Daily Treatment Diary      Manual                                                                                                                                                      Exercise Diary  9/27 10/2 10/4  8/23  8/28  8/30  9/4  9/6 9/20  9/25   bike  6' 7 7'  6 min  6 min  6 min   6 min  6 min  6 min   6'   Repeated sit to stand  foam 20x Foam 2 x 10  Foam 2x 10  foamx5 no foam x15   2x10  2x10  2x10  2 x 10   2x10 foam   Side stepping on foam  4 laps 4 laps 4 laps  4 laps  4 laps  4 laps  4 laps  4 laps  5 laps  5 laps   Heel raises  20x 20x 20x  20x  20x  20x  20x  20  20  20x   Standing hip 3 way  1 5# 20x 1 5# x 20 2 5# x 20   1 5# x 15  1 5# 20x ea  1 5# x 20  1 5# x20  x20  1 5# x 20   1 5# 20x   Static stance on foam  NBOS30"x3 NBOS 30"x 3  NBOS :30 x3  NBOS :30x3  NBOS EC :30 x3  NBOS :30 x3  NBOS  :30x3 NBOS :30x3  NBOS :30x3  NBOS 30"x3   Tandem stance  foam 30"x3  Foam :30x3  30" x 2 ea  30" x2 ea on foam  :30x 3 FOAM :30x  3 FOAM  30x3 FOAM  :30x3  30"x3 foam   Cone step over  5 laps 5 lap ss 5 laps  4 laps  4 laps  4 laps  4 laps  4 laps  5 laps  5 laps   Gait training w/obstacle navigation  3 laps  3 laps  3 laps  3 laps  3 laps    3 laps  2 laps  3 laps   Cone weaving  3 laps 3 laps   3 laps  3 laps    3 laps  3 laps  2 laps  3 laps   SLS  foam 10"x5 Foam:10x10  Foam :10x10    :10x10  :10x10  ;10x10  :10x10  :10x10 :10x5 FOAM  10"x5 foam   Biodex  LOS random lvl 11 x2 LOS/random lvl 11 x 2 LOS/random lvl 11x2  LOS, random lvl 11 x 2 ea   LOS, random lvl 11 x 2 ea  LOS,random lvl 11 x 2  LOS, random lvl 11 x2  LOS,random lvl 11 x 2 LOS,random lvl 11 x 2  LOS, random lvl 11 x2   Wobble board balance  AP/ML 20x APML x 20  NP  AP,ML x20 ea  AP,ML x20 ea Error - NV AP/MLx 20  AP/MLx 20  AP/ML x 20   AP/ML 20x    Cone side step over  4 laps 4 laps 4 laps     3 laps  3 laps  4 laps  4 laps  4 laps    Walking with head turns  4 laps 4 laps  4 laps         2 laps  2 laps  4 laps    Leg press   105# x 20 105# x 20                   Ball toss with FOAM    FOAM x 20                                                                                           Modalities                                                                                                    Assessment: Tolerated treatment well  Patient exhibited good technique with therapeutic exercises      Plan: Continue per plan of care

## 2018-10-09 ENCOUNTER — OFFICE VISIT (OUTPATIENT)
Dept: PHYSICAL THERAPY | Facility: CLINIC | Age: 83
End: 2018-10-09
Payer: COMMERCIAL

## 2018-10-09 DIAGNOSIS — Z98.2 S/P VP SHUNT: ICD-10-CM

## 2018-10-09 DIAGNOSIS — R26.9 GAIT DISTURBANCE: Primary | ICD-10-CM

## 2018-10-09 PROCEDURE — 97112 NEUROMUSCULAR REEDUCATION: CPT

## 2018-10-09 PROCEDURE — 97110 THERAPEUTIC EXERCISES: CPT

## 2018-10-09 NOTE — PROGRESS NOTES
Daily Note     Today's date: 10/9/2018  Patient name: Mike Conroy  : 1935  MRN: [id number]  Referring provider: Gayle Sanchez MD  Dx: No diagnosis found  Subjective: patient states that his legs feel "heavy" today and felt off balance this weekend while walking around his home  No recent falls  Objective: See treatment diary below  Precautions: Hx of stents, NPH w/shunt placement, fall risk, HTN     Daily Treatment Diary      Manual                                                                                                                                                      Exercise Diary  10/9          9/25   bike 6 min          6'   Repeated sit to stand 2x10 foam          2x10 foam   Side stepping on foam 5 laps          5 laps   Heel raises 20x          20x   Standing hip 3 way 2# x 20           1 5# 20x   Static stance on foam NBOS :30x3          NBOS 30"x3   Tandem stance 5 laps :10x10          30"x3 foam   Cone step over 4 laps          5 laps   Gait training w/obstacle navigation           3 laps   Cone weaving 3 laps          3 laps   SLS :10x5 FOAM          10"x5 foam   Biodex LOS,random lvl 11 x 2          LOS, random lvl 11 x2   Wobble board balance AP/MLx20          AP/ML 20x    Cone side step over 4 laps          4 laps    Walking with head turns           4 laps                                                                                    Modalities                                                                                                    Assessment: Tolerated treatment fair  Patient exhibited good technique with therapeutic exercises      Plan: Continue per plan of care

## 2018-10-11 ENCOUNTER — OFFICE VISIT (OUTPATIENT)
Dept: PHYSICAL THERAPY | Facility: CLINIC | Age: 83
End: 2018-10-11
Payer: COMMERCIAL

## 2018-10-11 DIAGNOSIS — Z98.2 S/P VP SHUNT: ICD-10-CM

## 2018-10-11 DIAGNOSIS — R26.9 GAIT DISTURBANCE: Primary | ICD-10-CM

## 2018-10-11 PROCEDURE — 97112 NEUROMUSCULAR REEDUCATION: CPT

## 2018-10-11 PROCEDURE — 97110 THERAPEUTIC EXERCISES: CPT

## 2018-10-11 NOTE — PROGRESS NOTES
Daily Note     Today's date: 10/11/2018  Patient name: Kwan Martel  : 1935  MRN: [id number]  Referring provider: Newton Mariscal MD  Dx:   Encounter Diagnosis     ICD-10-CM    1  Gait disturbance R26 9    2  S/P  shunt Z98 2                   Subjective: Patient continues to have a "heaviness" in his legs and is more unstable over the last few days  No complaints of falls  Objective: See treatment diary below      Assessment: Tolerated treatment fair  Patient would benefit from continued PT   Pt seemed more unsteady with uneven surfaces and required CG  Precautions: Hx of stents, NPH w/shunt placement, fall risk, HTN     Daily Treatment Diary      Manual                                                                                                                                                      Exercise Diary  10/9 10/11         9/25   bike 6 min 7 min          6'   Repeated sit to stand 2x10 foam 2x10 foam         2x10 foam   Side stepping on foam 5 laps 5 laps         5 laps   Heel raises 20x 20x         20x   Standing hip 3 way 2# x 20  2# x 20          1 5# 20x   Static stance on foam NBOS :30x3 NBOS  :30x3         NBOS 30"x3   Tandem stance 5 laps :10x10 5 laps :10x10         30"x3 foam   Cone step over 4 laps 4 laps         5 laps   Gait training w/obstacle navigation  4 laps         3 laps   Cone weaving 3 laps 3 laps         3 laps   SLS :10x5 FOAM :[id number]         10"x5 foam   Biodex LOS,random lvl 11 x 2 LOS, shade lvl 11 x2         LOS, random lvl 11 x2   Wobble board balance AP/MLx20 AP/MLx 20          AP/ML 20x    Cone side step over 4 laps 4 laps         4 laps    Walking with head turns  4 laps         4 laps   FWD/BWD  4 laps                                                                              Modalities                                                                                                    Plan: Continue per plan of care

## 2018-10-16 ENCOUNTER — EVALUATION (OUTPATIENT)
Dept: PHYSICAL THERAPY | Facility: CLINIC | Age: 83
End: 2018-10-16
Payer: COMMERCIAL

## 2018-10-16 DIAGNOSIS — R26.9 GAIT DISTURBANCE: Primary | ICD-10-CM

## 2018-10-16 DIAGNOSIS — Z98.2 S/P VP SHUNT: ICD-10-CM

## 2018-10-16 PROCEDURE — 97110 THERAPEUTIC EXERCISES: CPT | Performed by: PHYSICAL THERAPIST

## 2018-10-16 PROCEDURE — G8991 OTHER PT/OT GOAL STATUS: HCPCS | Performed by: PHYSICAL THERAPIST

## 2018-10-16 PROCEDURE — 97112 NEUROMUSCULAR REEDUCATION: CPT | Performed by: PHYSICAL THERAPIST

## 2018-10-16 PROCEDURE — G8990 OTHER PT/OT CURRENT STATUS: HCPCS | Performed by: PHYSICAL THERAPIST

## 2018-10-16 NOTE — PROGRESS NOTES
PT Re-Evaluation     Today's date: 10/16/2018  Patient name: Augustus Cordova  : 1935  MRN: [id number]  Referring provider: Linda Oviedo MD  Dx:   Encounter Diagnosis     ICD-10-CM    1  Gait disturbance R26 9    2  S/P  shunt Z98 2                   Assessment  Impairments: abnormal coordination, abnormal gait, abnormal muscle firing, abnormal or restricted ROM, abnormal movement, activity intolerance, difficulty understanding, impaired balance, impaired physical strength, lacks appropriate home exercise program, pain with function, safety issue and weight-bearing intolerance    Assessment details: Augustus Cordova has attended 10 visits since initiating skilled PT and has demonstrated overall improvement in his balance and gait measurements  He does report an increased sense of unsteadiness, and was educated at length regarding his current progression and expectations  His gait and balance and improving but his goal is to be able to ambulate without an assistive device and he still currently uses his SCP for all community and some home mobility  At this time, skilled physical therapy is warranted in order to address their remaining impairments and aide in return to functional activities  It is recommended that they continue to be seen 2x/week for an additional 4-6 weeks  Thank you for this pleasant referral!     Understanding of Dx/Px/POC: good   Prognosis: fair    Goals  Short Term Goals: to be achieved in 4 weeks  1) Patient to be independent with basic HEP - MET  2) Increase LE strength by 1/2 MMT grade in all deficient planes  - MET  3) Pt will demonstrate improved TUG score by 3 seconds- PARTIALLY MET    Long Term Goals: to be achieved by discharge  1) FOTO equal to or greater projected score - NOT MET  2) Patient to be independent with comprehensive HEP - MET  3) Patient will be able to descend stairs reciprocally with use of 1 UE hand rails - MET  4) Pt will demonstrate improved DGI score by at least 3 points- MET    Plan  Patient would benefit from: skilled PT  Referral necessary: No  Planned modality interventions: biofeedback, cryotherapy, hydrotherapy, TENS and unattended electrical stimulation  Planned therapy interventions: activity modification, ADL retraining, balance, balance/weight bearing training, behavior modification, body mechanics training, functional ROM exercises, gait training, home exercise program, IADL retraining, joint mobilization, manual therapy, massage, neuromuscular re-education, patient education, strengthening, stretching, therapeutic activities, therapeutic exercise, transfer training, graded exercise and postural training  Frequency: 2x week  Duration in weeks: 6  Plan of Care beginning date: 10/16/2018  Plan of Care expiration date: 11/27/2018  Treatment plan discussed with: patient        Subjective Evaluation    History of Present Illness  Date of surgery: 7/11/2018  Mechanism of injury: Patient notes significant improvements since beginning therapy  He continues to use [address] for all community mobility but at times can go without it in his home  His goal is to ambulate with no AD  He has difficulty with standing after sitting for the 1st 15 sec, walking in confined spaces and walking backwards  He avoids walking on uneven ground due to difficulty with this  Recurrent probem    Quality of life: good    Pain  No pain reported    Patient Goals  Patient goal: Be able to walk unassisted- and no longer require a SPC  Objective     Ambulation     Comments   Gait- decreased step height and step length, decreased foot clearance and slowed speed  Ambulates with NBQC   Shuffling gait in tight spaces and when navigating obstacles  TUG- 19 47 sec, no AD used, most challenge with turns- 9/6/18- 17 4 sec, challenge with turns remain; 10/16: 14 3 sec  Cognitive TUG-26 54; required a few seconds upon standing prior to initiating gait- 9/6/18 20 5 sec, errors in counting 10/16: 18 sec, inaccurate counting  DGI- 11/24 no AD used- 9/6/18- 14/24 no AD 10/16: 16/24 no AD  Stairs- Reciprocal use of handrail      Retropulsion- negative     LE strength- 4+/5 t/o sary LE's    Precautions: Hx of stents, NPH w/shunt placement, fall risk, HTN     Daily Treatment Diary      Manual                                                                                                                                                      Exercise Diary  10/9 10/11 10/16        9/25   bike 6 min 7 min  6 min        6'   Repeated sit to stand 2x10 foam 2x10 foam 2x10        2x10 foam   Side stepping on foam 5 laps 5 laps 10 laps        5 laps   Heel raises 20x 20x 20x        20x   Standing hip 3 way 2# x 20  2# x 20          1 5# 20x   Static stance on foam NBOS :30x3 NBOS  :30x3 NOBS :30/3x        NBOS 30"x3   Tandem stance 5 laps :10x10 5 laps :10x10 Foam 5 laps        30"x3 foam   Cone step over 4 laps 4 laps 4 laps        5 laps   Gait training w/obstacle navigation  4 laps 4 laps        3 laps   Cone weaving 3 laps 3 laps 4 laps        3 laps   SLS :10x5 FOAM :[id number] :10/10 FOAM        10"x5 foam   Biodex LOS,random lvl 11 x 2 LOS, shade lvl 11 x2 LOS, Random M, L8 x2        LOS, random lvl 11 x2   Wobble board balance AP/MLx20 AP/MLx 20  30x AP ML        AP/ML 20x    Cone side step over 4 laps 4 laps 4 laps        4 laps    Walking with head turns  4 laps 4 laps        4 laps   FWD/BWD  4 laps 4 laps FOAM            Stairs   1 flight                                                              Modalities

## 2018-10-18 ENCOUNTER — OFFICE VISIT (OUTPATIENT)
Dept: PHYSICAL THERAPY | Facility: CLINIC | Age: 83
End: 2018-10-18
Payer: COMMERCIAL

## 2018-10-18 DIAGNOSIS — R26.9 GAIT DISTURBANCE: Primary | ICD-10-CM

## 2018-10-18 DIAGNOSIS — Z98.2 S/P VP SHUNT: ICD-10-CM

## 2018-10-18 PROCEDURE — 97112 NEUROMUSCULAR REEDUCATION: CPT

## 2018-10-18 NOTE — PROGRESS NOTES
Daily Note     Today's date: 10/18/2018  Patient name: Narciso Lay  : 1935  MRN: [id number]  Referring provider: Rafa Benitez MD  Dx:   Encounter Diagnosis     ICD-10-CM    1  Gait disturbance R26 9    2  S/P  shunt Z98 2                   Subjective: patient states that he often loses his balance when making sharp turns and feels unstable when walking backwards         Objective: See treatment diary below  Precautions: Hx of stents, NPH w/shunt placement, fall risk, HTN     Daily Treatment Diary      Manual                                                                                                                                                      Exercise Diary  10/9 10/11 10/16 10/18       9/25   bike 6 min 7 min  6 min 7       6'   Repeated sit to stand 2x10 foam 2x10 foam 2x10 2x 10        2x10 foam   Side stepping on foam 5 laps 5 laps 10 laps 5 laps        5 laps   Heel raises 20x 20x 20x 20x       20x   Standing hip 3 way 2# x 20  2# x 20   2 5# x 20        1 5# 20x   Static stance on foam NBOS :30x3 NBOS  :30x3 NOBS :30/3x NBOS/EC:20x4       NBOS 30"x3   Tandem stance 5 laps :10x10 5 laps :10x10 Foam 5 laps FOAM :10x10        30"x3 foam   Cone step over 4 laps 4 laps 4 laps 4 laps       5 laps   Gait training w/obstacle navigation  4 laps 4 laps 4 laps       3 laps   Cone weaving 3 laps 3 laps 4 laps        3 laps   SLS :10x5 FOAM :[id number] :10/10 FOAM :10x10 FOAM       10"x5 foam   Biodex LOS,random lvl 11 x 2 LOS, shade lvl 11 x2 LOS, Random M, L8 x2 LOS,Random, L8 x 2 ea       LOS, random lvl 11 x2   Wobble board balance AP/MLx20 AP/MLx 20  30x AP ML 30x AP/ML       AP/ML 20x    Cone side step over 4 laps 4 laps 4 laps 5 laps       4 laps    Walking with head turns  4 laps 4 laps        4 laps   FWD/BWD  4 laps 4 laps  4 laps           Stairs   1 flight            Tandem walking on FOAM    4 laps           Leg press    115# x 30                               Modalities                                                                                                    Assessment: Tolerated treatment fair  Patient exhibited good technique with therapeutic exercises      Plan: Continue per plan of care

## 2018-10-23 ENCOUNTER — OFFICE VISIT (OUTPATIENT)
Dept: PHYSICAL THERAPY | Facility: CLINIC | Age: 83
End: 2018-10-23
Payer: COMMERCIAL

## 2018-10-23 DIAGNOSIS — R26.9 GAIT DISTURBANCE: Primary | ICD-10-CM

## 2018-10-23 DIAGNOSIS — Z98.2 S/P VP SHUNT: ICD-10-CM

## 2018-10-23 PROCEDURE — 97112 NEUROMUSCULAR REEDUCATION: CPT

## 2018-10-23 PROCEDURE — 97110 THERAPEUTIC EXERCISES: CPT

## 2018-10-23 NOTE — PROGRESS NOTES
Daily Note     Today's date: 10/23/2018  Patient name: Rowdy Medrano  : 1935  MRN: [id number]  Referring provider: Vesna Jones MD  Dx:   Encounter Diagnosis     ICD-10-CM    1  Gait disturbance R26 9    2  S/P  shunt Z98 2      1:1 with PTA CR 11:30- 12:10  Subjective: Cues for improved step length and foot clearance         Objective: See treatment diary below  Precautions: Hx of stents, NPH w/shunt placement, fall risk, HTN     Daily Treatment Diary      Manual                                                                              Exercise Diary  10/9 10/11 10/16 10/18 10/23      9/25   bike 6 min 7 min  6 min 7 7 mins      6'   Repeated sit to stand 2x10 foam 2x10 foam 2x10 2x 10  2x10      2x10 foam   Side stepping on foam 5 laps 5 laps 10 laps 5 laps  5 laps      5 laps   Heel raises 20x 20x 20x 20x 20      20x   Standing hip 3 way 2# x 20  2# x 20   2 5# x 20  2 5#  20 ea       1 5# 20x   Static stance on foam NBOS :30x3 NBOS  :30x3 NOBS :30/3x NBOS/EC:20x4 NBOS/EC  20"x4      NBOS 30"x3   Tandem stance 5 laps :10x10 5 laps :10x10 Foam 5 laps FOAM :10x10  Foam  10"x10      30"x3 foam   Cone step over 4 laps 4 laps 4 laps 4 laps 4 laps      5 laps   Gait training w/obstacle navigation  4 laps 4 laps 4 laps 4 laps      3 laps   Cone weaving 3 laps 3 laps 4 laps  4 laps      3 laps   SLS :10x5 FOAM :[id number] :10/10 FOAM :10x10 FOAM Foam  10"x10      10"x5 foam   Biodex LOS,random lvl 11 x 2 LOS, shade lvl 11 x2 LOS, Random M, L8 x2 LOS,Random, L8 x 2 ea LOS, RC, L8  x2 ea       LOS, random lvl 11 x2   Wobble board balance AP/MLx20 AP/MLx 20  30x AP ML 30x AP/ML 30x  AP/ML      AP/ML 20x    Cone side step over 4 laps 4 laps 4 laps 5 laps 4 laps      4 laps    Walking with head turns  4 laps 4 laps        4 laps   FWD/BWD  4 laps 4 laps  4 laps 4 laps          Stairs   1 flight            Tandem walking on FOAM    4 laps 4 laps          Leg press    115# x 30 115#  3x10                     Modalities                                                                                                    Assessment: Tolerated treatment fair  Patient exhibited good technique with therapeutic exercises  Requires most assistance with backwards walking and turns  Plan: Continue per plan of care

## 2018-10-25 ENCOUNTER — OFFICE VISIT (OUTPATIENT)
Dept: PHYSICAL THERAPY | Facility: CLINIC | Age: 83
End: 2018-10-25
Payer: COMMERCIAL

## 2018-10-25 DIAGNOSIS — R26.9 GAIT DISTURBANCE: Primary | ICD-10-CM

## 2018-10-25 DIAGNOSIS — Z98.2 S/P VP SHUNT: ICD-10-CM

## 2018-10-25 PROCEDURE — 97110 THERAPEUTIC EXERCISES: CPT

## 2018-10-25 PROCEDURE — 97112 NEUROMUSCULAR REEDUCATION: CPT

## 2018-10-25 NOTE — PROGRESS NOTES
Daily Note     Today's date: 10/25/2018  Patient name: Radha Hein  : 1935  MRN: [id number]  Referring provider: Vika Frey MD  Dx:   Encounter Diagnosis     ICD-10-CM    1  Gait disturbance R26 9    2  S/P  shunt Z98 2                   Subjective: No recent complaints of falls since last treatment session  Objective: See treatment diary below    Precautions: Hx of stents, NPH w/shunt placement, fall risk, HTN     Daily Treatment Diary      Manual                                                                              Exercise Diary  10/9 10/11 10/16 10/18 10/23 10/25     9/25   bike 6 min 7 min  6 min 7 7 mins 7'     6'   Repeated sit to stand 2x10 foam 2x10 foam 2x10 2x 10  2x10 2 x 10      2x10 foam   Side stepping on foam 5 laps 5 laps 10 laps 5 laps  5 laps 5 laps     5 laps   Heel raises 20x 20x 20x 20x 20 20     20x   Standing hip 3 way 2# x 20  2# x 20   2 5# x 20  2 5#  20 ea  3# x 20      1 5# 20x   Static stance on foam NBOS :30x3 NBOS  :30x3 NOBS :30/3x NBOS/EC:20x4 NBOS/EC  20"x4 NBOS/EC :20x4     NBOS 30"x3   Tandem stance 5 laps :10x10 5 laps :10x10 Foam 5 laps FOAM :10x10  Foam  10"x10 Foam :10x10     30"x3 foam   Cone step over 4 laps 4 laps 4 laps 4 laps 4 laps 4 laps      5 laps   Gait training w/obstacle navigation  4 laps 4 laps 4 laps 4 laps      3 laps   Cone weaving 3 laps 3 laps 4 laps  4 laps      3 laps   SLS :10x5 FOAM :[id number] :1010 FOAM :10x10 FOAM Foam  10"x10 Foam :1010     10"x5 foam   Biodex LOS,random lvl 11 x 2 LOS, shade lvl 11 x2 LOS, Random M, L8 x2 LOS,Random, L8 x 2 ea LOS, RC, L8  x2 ea   LOS, RC L8x 2 ea     LOS, random lvl 11 x2   Wobble board balance AP/MLx20 AP/MLx 20  30x AP ML 30x AP/ML 30x  AP/ML 30x      AP/ML 20x    Cone side step over 4 laps 4 laps 4 laps 5 laps 4 laps 4 laps     4 laps    Walking with head turns  4 laps 4 laps        4 laps   FWD/BWD  4 laps 4 laps  4 laps 4 laps 4 laps         Stairs   1 flight            Tandem walking on FOAM    4 laps 4 laps 4 laps          Leg press    115# x 30 115#  3x10                              Modalities                                                                                                  Assessment: Tolerated treatment fair  Patient exhibited good technique with therapeutic exercises      Plan: Continue per plan of care

## 2018-10-30 ENCOUNTER — OFFICE VISIT (OUTPATIENT)
Dept: PHYSICAL THERAPY | Facility: CLINIC | Age: 83
End: 2018-10-30
Payer: COMMERCIAL

## 2018-10-30 DIAGNOSIS — Z98.2 S/P VP SHUNT: ICD-10-CM

## 2018-10-30 DIAGNOSIS — R26.9 GAIT DISTURBANCE: Primary | ICD-10-CM

## 2018-10-30 PROCEDURE — 97112 NEUROMUSCULAR REEDUCATION: CPT

## 2018-10-30 NOTE — PROGRESS NOTES
Daily Note     Today's date: 10/30/2018  Patient name: Cong Riddle  : 1935  MRN: [id number]  Referring provider: Gaudencio Roman MD  Dx:   Encounter Diagnosis     ICD-10-CM    1  Gait disturbance R26 9    2  S/P  shunt Z98 2                   Subjective: Patient denies any recent falls or balance issues  Remains maximally compliant with his HEP    Objective: See treatment diary below  Precautions: Hx of stents, NPH w/shunt placement, fall risk, HTN     Daily Treatment Diary      Manual                                                                              Exercise Diary  10/9 10/11 10/16 10/18 10/23 10/25 10/30      bike 6 min 7 min  6 min 7 7 mins 7' 7'      Repeated sit to stand 2x10 foam 2x10 foam 2x10 2x 10  2x10 2 x 10  2x10      Side stepping on foam 5 laps 5 laps 10 laps 5 laps  5 laps 5 laps 5 laps      Heel raises 20x 20x 20x 20x 20 20 20      Standing hip 3 way 2# x 20  2# x 20   2 5# x 20  2 5#  20 ea  3# x 20  3# x 20      Static stance on foam NBOS :30x3 NBOS  :30x3 NOBS :30/3x NBOS/EC:20x4 NBOS/EC  20"x4 NBOS/EC :20x4 NBOS :20x4      Tandem stance 5 laps :10x10 5 laps :10x10 Foam 5 laps FOAM :10x10  Foam  10"x10 Foam :10x10 Foam :10x10      Cone step over 4 laps 4 laps 4 laps 4 laps 4 laps 4 laps  4 laps      Gait training w/obstacle navigation  4 laps 4 laps 4 laps 4 laps        Cone weaving 3 laps 3 laps 4 laps  4 laps        SLS :10x5 FOAM :[id number] :10/10 FOAM :10x10 FOAM Foam  10"x10 Foam :1010 Foam :10x10       Biodex LOS,random lvl 11 x 2 LOS, shade lvl 11 x2 LOS, Random M, L8 x2 LOS,Random, L8 x 2 ea LOS, RC, L8  x2 ea   LOS, RC L8x 2 ea LOS,RC L8 x 3      Wobble board balance AP/MLx20 AP/MLx 20  30x AP ML 30x AP/ML 30x  AP/ML 30x         Cone side step over 4 laps 4 laps 4 laps 5 laps 4 laps 4 laps 4 laps        Walking with head turns  4 laps 4 laps          FWD/BWD  4 laps 4 laps  4 laps 4 laps 4 laps 4 laps       Stairs   1 flight           Tandem walking on FOAM    4 laps 4 laps 4 laps  4 laps       Leg press    115# x 30 115#  3x10  130#x 30                            Modalities                                                                                                    Assessment: Tolerated treatment fair  Patient exhibited good technique with therapeutic exercises      Plan: Continue per plan of care

## 2018-11-01 ENCOUNTER — OFFICE VISIT (OUTPATIENT)
Dept: PHYSICAL THERAPY | Facility: CLINIC | Age: 83
End: 2018-11-01
Payer: COMMERCIAL

## 2018-11-01 DIAGNOSIS — R26.9 GAIT DISTURBANCE: Primary | ICD-10-CM

## 2018-11-01 DIAGNOSIS — Z98.2 S/P VP SHUNT: ICD-10-CM

## 2018-11-01 PROCEDURE — 97112 NEUROMUSCULAR REEDUCATION: CPT

## 2018-11-01 NOTE — PROGRESS NOTES
Daily Note     Today's date: 2018  Patient name: Shavon Crespo  : 1935  MRN: [id number]  Referring provider: Ruben Villarreal MD  Dx:   Encounter Diagnosis     ICD-10-CM    1  Gait disturbance R26 9    2  S/P  shunt Z98 2                   Subjective: Patient states that he has not had any recent falls   Continues to use [facility] for household/community mobility  States that he feels the most unstable when transferring from a sit to stand position  Objective: See treatment diary below  Precautions: Hx of stents, NPH w/shunt placement, fall risk, HTN     Daily Treatment Diary      Manual                                                                              Exercise Diary  10/9 10/11 10/16 10/18 10/23 10/25 10/30 11/1     bike 6 min 7 min  6 min 7 7 mins 7' 7' 7     Repeated sit to stand 2x10 foam 2x10 foam 2x10 2x 10  2x10 2 x 10  2x10 2x10     Side stepping on foam 5 laps 5 laps 10 laps 5 laps  5 laps 5 laps 5 laps 5 laps     Heel raises 20x 20x 20x 20x 20 20 20 20     Standing hip 3 way 2# x 20  2# x 20   2 5# x 20  2 5#  20 ea  3# x 20  3# x 20 3# x 20      Static stance on foam NBOS :30x3 NBOS  :30x3 NOBS :30/3x NBOS/EC:20x4 NBOS/EC  20"x4 NBOS/EC :20x4 NBOS :20x4 NBOS :30 x 4     Tandem stance 5 laps :10x10 5 laps :10x10 Foam 5 laps FOAM :10x10  Foam  10"x10 Foam :10x10 Foam :10x10 Foam :10x10      Cone step over 4 laps 4 laps 4 laps 4 laps 4 laps 4 laps  4 laps 4 laps     Gait training w/obstacle navigation  4 laps 4 laps 4 laps 4 laps        Cone weaving 3 laps 3 laps 4 laps  4 laps        SLS :10x5 FOAM :[id number] :10/10 FOAM :10x10 FOAM Foam  10"x10 Foam :1010 Foam :10x10  Foam :10x10      Biodex LOS,random lvl 11 x 2 LOS, shade lvl 11 x2 LOS, Random M, L8 x2 LOS,Random, L8 x 2 ea LOS, RC, L8  x2 ea   LOS, RC L8x 2 ea LOS,RC L8 x 3 LOS,RC lvl 11 x 2     Wobble board balance AP/MLx20 AP/MLx 20  30x AP ML 30x AP/ML 30x  AP/ML 30x         Cone side step over 4 laps 4 laps 4 laps 5 laps 4 laps 4 laps 4 laps  4 laps      Walking with head turns  4 laps 4 laps          FWD/BWD  4 laps 4 laps  4 laps 4 laps 4 laps 4 laps 4 laps      Stairs   1 flight           Tandem walking on FOAM    4 laps 4 laps 4 laps  4 laps 5 laps      Leg press    115# x 30 115#  3x10  130#x 30  120# x 25                          Modalities                                                                                                      Assessment: Tolerated treatment fair  Patient exhibited good technique with therapeutic exercises      Plan: Continue per plan of care

## 2018-11-06 ENCOUNTER — OFFICE VISIT (OUTPATIENT)
Dept: PHYSICAL THERAPY | Facility: CLINIC | Age: 83
End: 2018-11-06
Payer: COMMERCIAL

## 2018-11-06 DIAGNOSIS — R26.9 GAIT DISTURBANCE: Primary | ICD-10-CM

## 2018-11-06 DIAGNOSIS — Z98.2 S/P VP SHUNT: ICD-10-CM

## 2018-11-06 PROCEDURE — 97112 NEUROMUSCULAR REEDUCATION: CPT | Performed by: PHYSICAL THERAPIST

## 2018-11-06 NOTE — PROGRESS NOTES
Daily Note     Today's date: 2018  Patient name: Mayra Anaya  : 1935  MRN: [id number]  Referring provider: Abril Caballero MD  Dx:   Encounter Diagnosis     ICD-10-CM    1  Gait disturbance R26 9    2  S/P  shunt Z98 2                   Subjective: Pt notes little change in balance      Objective: See treatment diary below      Assessment: Tolerated treatment well  Patient challenged by cone obstacle exercise      Plan: Continue per plan of care  Precautions: Hx of stents, NPH w/shunt placement, fall risk, HTN     Daily Treatment Diary      Manual                                                                              Exercise Diary  10/9 10/11 10/16 10/18 10/23 10/25 10/30 11/1 11/7    bike 6 min 7 min  6 min 7 7 mins 7' 7' 7 7'    Repeated sit to stand 2x10 foam 2x10 foam 2x10 2x 10  2x10 2 x 10  2x10 2x10 2x10    Side stepping on foam 5 laps 5 laps 10 laps 5 laps  5 laps 5 laps 5 laps 5 laps 5 laps    Heel raises 20x 20x 20x 20x 20 20 20 20 20    Standing hip 3 way 2# x 20  2# x 20   2 5# x 20  2 5#  20 ea  3# x 20  3# x 20 3# x 20  3# 20    Static stance on foam NBOS :30x3 NBOS  :30x3 NOBS :30/3x NBOS/EC:20x4 NBOS/EC  20"x4 NBOS/EC :20x4 NBOS :20x4 NBOS :30 x 4 NBOX :30 4    Tandem stance 5 laps :10x10 5 laps :10x10 Foam 5 laps FOAM :10x10  Foam  10"x10 Foam :10x10 Foam :10x10 Foam :10x10  Foam :10 10    Cone step over 4 laps 4 laps 4 laps 4 laps 4 laps 4 laps  4 laps 4 laps 4 laps    Gait training w/obstacle navigation  4 laps 4 laps 4 laps 4 laps        Cone weaving 3 laps 3 laps 4 laps  4 laps        SLS :10x5 FOAM :[id number] :10/10 FOAM :10x10 FOAM Foam  10"x10 Foam :1010 Foam :10x10  Foam :10x10  Foam :10 10    Biodex LOS,random lvl 11 x 2 LOS, shade lvl 11 x2 LOS, Random M, L8 x2 LOS,Random, L8 x 2 ea LOS, RC, L8  x2 ea   LOS, RC L8x 2 ea LOS,RC L8 x 3 LOS,RC lvl 11 x 2 LOS R C M 11 x 2    Wobble board balance AP/MLx20 AP/MLx 20  30x AP ML 30x AP/ML 30x  AP/ML 30x    30x     Cone side step over 4 laps 4 laps 4 laps 5 laps 4 laps 4 laps 4 laps  4 laps 4 laps     Walking with head turns  4 laps 4 laps          FWD/BWD  4 laps 4 laps  4 laps 4 laps 4 laps 4 laps 4 laps 4 laps      Stairs   1 flight           Tandem walking on FOAM    4 laps 4 laps 4 laps  4 laps 5 laps 5 laps     Leg press    115# x 30 115#  3x10  130#x 30  120# x 25 120# 25                         Modalities

## 2018-11-08 ENCOUNTER — OFFICE VISIT (OUTPATIENT)
Dept: PHYSICAL THERAPY | Facility: CLINIC | Age: 83
End: 2018-11-08
Payer: COMMERCIAL

## 2018-11-08 DIAGNOSIS — Z98.2 S/P VP SHUNT: ICD-10-CM

## 2018-11-08 DIAGNOSIS — R26.9 GAIT DISTURBANCE: Primary | ICD-10-CM

## 2018-11-08 PROCEDURE — 97112 NEUROMUSCULAR REEDUCATION: CPT

## 2018-11-08 NOTE — PROGRESS NOTES
Daily Note     Today's date: 2018  Patient name: Celeste Hamilton  : 1935  MRN: [id number]  Referring provider: Can Jane MD  Dx:   Encounter Diagnosis     ICD-10-CM    1  Gait disturbance R26 9    2  S/P  shunt Z98 2                   Subjective: Patient denies recent falls State that his major complaint in L knee pain at this time  Patent has been maximally compliant with HEP  Objective: See treatment diary below  Precautions: Hx of stents, NPH w/shunt placement, fall risk, HTN     Daily Treatment Diary      Manual                                                                              Exercise Diary  10/9 10/11 10/16 10/18 10/23 10/25 10/30 11/1 11/7 11/8   bike 6 min 7 min  6 min 7 7 mins 7' 7' 7 7' 7   Repeated sit to stand 2x10 foam 2x10 foam 2x10 2x 10  2x10 2 x 10  2x10 2x10 2x10  2 x10    Side stepping on foam 5 laps 5 laps 10 laps 5 laps  5 laps 5 laps 5 laps 5 laps 5 laps 5 laps   Heel raises 20x 20x 20x 20x 20 20 20 20 20 20   Standing hip 3 way 2# x 20  2# x 20   2 5# x 20  2 5#  20 ea  3# x 20  3# x 20 3# x 20  3# 20 4# x 20    Static stance on foam NBOS :30x3 NBOS  :30x3 NOBS :30/3x NBOS/EC:20x4 NBOS/EC  20"x4 NBOS/EC :20x4 NBOS :20x4 NBOS :30 x 4 NBOX :30 4 NBOS :30x4   Tandem stance 5 laps :10x10 5 laps :10x10 Foam 5 laps FOAM :10x10  Foam  10"x10 Foam :10x10 Foam :10x10 Foam :10x10  Foam :10 10 FOAM :10x10    Cone step over 4 laps 4 laps 4 laps 4 laps 4 laps 4 laps  4 laps 4 laps 4 laps 5 laps   Gait training w/obstacle navigation  4 laps 4 laps 4 laps 4 laps        Cone weaving 3 laps 3 laps 4 laps  4 laps     4 laps    SLS :10x5 FOAM :[id number] :10/10 FOAM :10x10 FOAM Foam  10"x10 Foam :1010 Foam :10x10  Foam :10x10  Foam :10 10 Foam :10x10    Biodex LOS,random lvl 11 x 2 LOS, shade lvl 11 x2 LOS, Random M, L8 x2 LOS,Random, L8 x 2 ea LOS, RC, L8  x2 ea   LOS, RC L8x 2 ea LOS,RC L8 x 3 LOS,RC lvl 11 x 2 LOS R C M 11 x 2 FOAM LOS,RS L11 x 3   Wobble board balance/taps AP/MLx20 AP/MLx 20  30x AP ML 30x AP/ML 30x  AP/ML 30x    30x 30x/:30x5    Cone side step over 4 laps 4 laps 4 laps 5 laps 4 laps 4 laps 4 laps  4 laps 4 laps     Walking with head turns  4 laps 4 laps          FWD/BWD  4 laps 4 laps  4 laps 4 laps 4 laps 4 laps 4 laps 4 laps  5 laps    Stairs   1 flight           Tandem walking on FOAM    4 laps 4 laps 4 laps  4 laps 5 laps 5 laps 5 laps     Leg press    115# x 30 115#  3x10  130#x 30  120# x 25 120# 25 130# x 30                        Modalities                                                                                                  Assessment: Tolerated treatment fair  Patient exhibited good technique with therapeutic exercises      Plan: Continue per plan of care

## 2018-11-13 ENCOUNTER — EVALUATION (OUTPATIENT)
Dept: PHYSICAL THERAPY | Facility: CLINIC | Age: 83
End: 2018-11-13
Payer: COMMERCIAL

## 2018-11-13 DIAGNOSIS — G91.2 NORMAL PRESSURE HYDROCEPHALUS (HCC): ICD-10-CM

## 2018-11-13 DIAGNOSIS — Z98.2 S/P VP SHUNT: ICD-10-CM

## 2018-11-13 DIAGNOSIS — R26.9 GAIT DISTURBANCE: Primary | ICD-10-CM

## 2018-11-13 PROCEDURE — G8991 OTHER PT/OT GOAL STATUS: HCPCS

## 2018-11-13 PROCEDURE — G8990 OTHER PT/OT CURRENT STATUS: HCPCS

## 2018-11-13 PROCEDURE — 97112 NEUROMUSCULAR REEDUCATION: CPT

## 2018-11-13 NOTE — PROGRESS NOTES
PT Re-Evaluation     Today's date: 2018  Patient name: Shavon Crespo  : 1935  MRN: [id number]  Referring provider: Ruben Villarreal MD  Dx:   Encounter Diagnosis     ICD-10-CM    1  Gait disturbance R26 9    2  S/P  shunt Z98 2                   Assessment  Impairments: abnormal coordination, abnormal gait, impaired balance, impaired physical strength and lacks appropriate home exercise program    Assessment details: Patient improved, has some continued decreased but is generally safe/stable  Plan to continue 1 visit to update/review HEP then will transition to HEP    Understanding of Dx/Px/POC: good   Prognosis: fair    Goals  Short Term Goals: to be achieved in 4 weeks  1) Patient to be independent with basic HEP - MET  2) Increase LE strength by 1/2 MMT grade in all deficient planes  - MET  3) Pt will demonstrate improved TUG score by 3 seconds- PARTIALLY MET    Long Term Goals: to be achieved by discharge  1) FOTO equal to or greater projected score - NOT MET  2) Patient to be independent with comprehensive HEP - MET  3) Patient will be able to descend stairs reciprocally with use of 1 UE hand rails - MET  4) Pt will demonstrate improved DGI score by at least 3 points- MET    Plan  Patient would benefit from: skilled PT  Referral necessary: No  Planned modality interventions: biofeedback, cryotherapy and unattended electrical stimulation  Planned therapy interventions: activity modification, ADL retraining, balance, balance/weight bearing training, behavior modification, body mechanics training, functional ROM exercises, gait training, home exercise program, IADL retraining, joint mobilization, manual therapy, massage, neuromuscular re-education, patient education, strengthening, stretching, therapeutic activities, therapeutic exercise, transfer training, graded exercise and postural training  Frequency: 2x week  Duration in weeks: 4  Plan of Care expiration date: 2018  Treatment plan discussed with: patient  Plan details: 1 visit for HEP review        Subjective Evaluation    History of Present Illness  Date of surgery: 7/11/2018  Mechanism of injury: Patient notes improved balance  He has no falls or near falls recently  He feels safe/stable including on stairs  He is unsure of his HEP however  Recurrent probem    Quality of life: good    Pain  No pain reported    Patient Goals  Patient goal: Be able to walk unassisted- and no longer require a SPC  Objective     Ambulation     Comments   Gait- decreased step height and step length, decreased foot clearance and slowed speed  Ambulates with NBQC  Shuffling gait in tight spaces and when navigating obstacles- Majorly improved  TUG- 19 47 sec, no AD used, most challenge with turns- 9/6/18- 17 4 sec, challenge with turns remain; 10/16: 14 3 sec- NOT RETESTED  Cognitive TUG-26 54; required a few seconds upon standing prior to initiating gait- 9/6/18 20 5 sec, errors in counting   10/16: 18 sec, inaccurate counting-NOT RETESTED  DGI- 11/24 no AD used- 9/6/18- 14/24 no AD 10/16: 16/24 no AD; 17/24  Stairs- Reciprocal use of handrail      Retropulsion- negative     LE strength- 4+/5 t/o sary LE's    Precautions: Hx of stents, NPH w/shunt placement, fall risk, HTN     Daily Treatment Diary      Manual                                                                                                                                                      Exercise Diary  10/9 10/11 10/16        9/25   bike 6 min 7 min  6 min        6'   Repeated sit to stand 2x10 foam 2x10 foam 2x10        2x10 foam   Side stepping on foam 5 laps 5 laps 10 laps        5 laps   Heel raises 20x 20x 20x        20x   Standing hip 3 way 2# x 20  2# x 20          1 5# 20x   Static stance on foam NBOS :30x3 NBOS  :30x3 NOBS :30/3x        NBOS 30"x3   Tandem stance 5 laps :10x10 5 laps :10x10 Foam 5 laps        30"x3 foam   Cone step over 4 laps 4 laps 4 laps        5 laps Gait training w/obstacle navigation  4 laps 4 laps        3 laps   Cone weaving 3 laps 3 laps 4 laps        3 laps   SLS :10x5 FOAM :[id number] :10/10 FOAM        10"x5 foam   Biodex LOS,random lvl 11 x 2 LOS, shade lvl 11 x2 LOS, Random M, L8 x2        LOS, random lvl 11 x2   Wobble board balance AP/MLx20 AP/MLx 20  30x AP ML        AP/ML 20x    Cone side step over 4 laps 4 laps 4 laps        4 laps    Walking with head turns  4 laps 4 laps        4 laps   FWD/BWD  4 laps 4 laps FOAM            Stairs   1 flight                                                              Modalities

## 2018-11-13 NOTE — PROGRESS NOTES
Daily Note     Today's date: 2018  Patient name: Sander Koch  : 1935  MRN: [id number]  Referring provider: Logan Sosa MD  Dx:   Encounter Diagnosis     ICD-10-CM    1  Gait disturbance R26 9    2  S/P  shunt Z98 2                   Subjective: Refer to RE      Objective: See treatment diary below  Precautions: Hx of stents, NPH w/shunt placement, fall risk, HTN     Daily Treatment Diary      Manual                                                                              Exercise Diary  11/13    10/23 10/25 10/30 11/1 11/7 11/8   bike 7    7 mins 7' 7' 7 7' 7   Repeated sit to stand     2x10 2 x 10  2x10 2x10 2x10  2 x10    Side stepping on foam 5 laps    5 laps 5 laps 5 laps 5 laps 5 laps 5 laps   Heel raises 20     20 20 20 20 20 20   Standing hip 3 way     2 5#  20 ea  3# x 20  3# x 20 3# x 20  3# 20 4# x 20    Static stance on foam NBOS :30x4    NBOS/EC  20"x4 NBOS/EC :20x4 NBOS :20x4 NBOS :30 x 4 NBOX :30 4 NBOS :30x4   Tandem stance Foam:10x10     Foam  10"x10 Foam :10x10 Foam :10x10 Foam :10x10  Foam :10 10 FOAM :10x10    Cone step over     4 laps 4 laps  4 laps 4 laps 4 laps 5 laps   Gait training w/obstacle navigation     4 laps        Cone weaving     4 laps     4 laps    SLS Foam :10x10    Foam  10"x10 Foam :1010 Foam :10x10  Foam :10x10  Foam :10 10 Foam :10x10    Biodex     LOS, RC, L8  x2 ea   LOS, RC L8x 2 ea LOS,RC L8 x 3 LOS,RC lvl 11 x 2 LOS R C M 11 x 2 FOAM LOS,RS L11 x 3   Wobble board balance/taps 30x :30x 5    30x  AP/ML 30x    30x 30x/:30x5    Cone side step over     4 laps 4 laps 4 laps  4 laps 4 laps     Walking with head turns             FWD/BWD 5 laps    4 laps 4 laps 4 laps 4 laps 4 laps  5 laps    Stairs              Tandem walking on FOAM 5 laps    4 laps 4 laps  4 laps 5 laps 5 laps 5 laps     Leg press 130# x 30     115#  3x10  130#x 30  120# x 25 120# 25 130# x 30                        Modalities                                                                                                  Assessment: Tolerated treatment fair  Patient exhibited good technique with therapeutic exercises  Plan: Continue per plan of care  Update HEP at   Jensen Saenz 144 for DC

## 2018-11-15 ENCOUNTER — OFFICE VISIT (OUTPATIENT)
Dept: PHYSICAL THERAPY | Facility: CLINIC | Age: 83
End: 2018-11-15
Payer: COMMERCIAL

## 2018-11-15 DIAGNOSIS — Z98.2 S/P VP SHUNT: ICD-10-CM

## 2018-11-15 DIAGNOSIS — G91.2 NORMAL PRESSURE HYDROCEPHALUS (HCC): ICD-10-CM

## 2018-11-15 DIAGNOSIS — R26.9 GAIT DISTURBANCE: Primary | ICD-10-CM

## 2018-11-15 PROCEDURE — 97112 NEUROMUSCULAR REEDUCATION: CPT

## 2018-11-15 NOTE — PROGRESS NOTES
Daily Note     Today's date: 11/15/2018  Patient name: Sanford Hamilton  : 1935  MRN: [id number]  Referring provider: Rashida Cole MD  Dx:   Encounter Diagnosis     ICD-10-CM    1  Gait disturbance R26 9    2  S/P  shunt Z98 2    3  Normal pressure hydrocephalus G91 2                   Subjective: Patient denies a change in status  He has been maximally compliant with HEP 2x/day and feels ready to DC after treatment today  Objective: See treatment diary below  Precautions: Hx of stents, NPH w/shunt placement, fall risk, HTN     Daily Treatment Diary      Manual                                                                              Exercise Diary  11/13 11/15   10/23 10/25 10/30 11/1 11/7 11/8   bike 7 7   7 mins 7' 7' 7 7' 7   Repeated sit to stand  Knee pain   2x10 2 x 10  2x10 2x10 2x10  2 x10    Side stepping on foam 5 laps    5 laps 5 laps 5 laps 5 laps 5 laps 5 laps   Heel raises 20  20   20 20 20 20 20 20   Standing hip 3 way     2 5#  20 ea  3# x 20  3# x 20 3# x 20  3# 20 4# x 20    Static stance on foam NBOS :30x4 NBOS :30 x4    NBOS/EC  20"x4 NBOS/EC :20x4 NBOS :20x4 NBOS :30 x 4 NBOX :30 4 NBOS :30x4   Tandem stance Foam:10x10  Foam :10x10    Foam  10"x10 Foam :10x10 Foam :10x10 Foam :10x10  Foam :10 10 FOAM :10x10    Cone step over     4 laps 4 laps  4 laps 4 laps 4 laps 5 laps   Gait training w/obstacle navigation     4 laps        Cone weaving     4 laps     4 laps    SLS Foam :10x10 Foam :10x10   Foam  10"x10 Foam :1010 Foam :10x10  Foam :10x10  Foam :10 10 Foam :10x10    Biodex  LOS,maze,RC x 3 lvl 10   LOS, RC, L8  x2 ea   LOS, RC L8x 2 ea LOS,RC L8 x 3 LOS,RC lvl 11 x 2 LOS R C M 11 x 2 FOAM LOS,RS L11 x 3   Wobble board balance/taps 30x :30x 5 30x :30 x 5    30x  AP/ML 30x    30x 30x/:30x5    Cone side step over  4 laps    4 laps 4 laps 4 laps  4 laps 4 laps     Walking with head turns             FWD/BWD 5 laps 5 laps   4 laps 4 laps 4 laps 4 laps 4 laps  5 laps    Stairs  Tandem walking on FOAM 5 laps 5 laps    4 laps 4 laps  4 laps 5 laps 5 laps 5 laps     Leg press 130# x 30  130# x 30    115#  3x10  130#x 30  120# x 25 120# 25 130# x 30                        Modalities                                                                                                    Assessment: Tolerated treatment well   Patient exhibited good technique with therapeutic exercises      Plan: Patient will be discharged to [facility]

## 2018-11-20 ENCOUNTER — APPOINTMENT (OUTPATIENT)
Dept: PHYSICAL THERAPY | Facility: CLINIC | Age: 83
End: 2018-11-20
Payer: COMMERCIAL

## 2018-11-27 ENCOUNTER — APPOINTMENT (OUTPATIENT)
Dept: PHYSICAL THERAPY | Facility: CLINIC | Age: 83
End: 2018-11-27
Payer: COMMERCIAL

## 2018-11-29 ENCOUNTER — APPOINTMENT (OUTPATIENT)
Dept: PHYSICAL THERAPY | Facility: CLINIC | Age: 83
End: 2018-11-29
Payer: COMMERCIAL

## 2019-01-10 ENCOUNTER — TELEPHONE (OUTPATIENT)
Dept: NEUROSURGERY | Facility: CLINIC | Age: 84
End: 2019-01-10

## 2019-01-10 DIAGNOSIS — R26.9 GAIT DISTURBANCE: Primary | ICD-10-CM

## 2019-01-10 NOTE — TELEPHONE ENCOUNTER
Spoke with patient and scheduled him for the following appts:    Tuesday, Feb 5th (SLT):    PT @ 1030am, followed by appt with DR @ 1130am     Verified over the phone with [place]

## 2019-01-10 NOTE — TELEPHONE ENCOUNTER
----- Message from Fela Mckeon RN sent at 8/9/2018  2:28 PM EDT -----  Regarding: NPH-6 month f/u  Patient due for 6 month f/u in Feb 2019  Contact patient to schedule

## 2019-02-05 ENCOUNTER — OFFICE VISIT (OUTPATIENT)
Dept: NEUROSURGERY | Facility: CLINIC | Age: 84
End: 2019-02-05
Payer: COMMERCIAL

## 2019-02-05 ENCOUNTER — OFFICE VISIT (OUTPATIENT)
Dept: PHYSICAL THERAPY | Facility: CLINIC | Age: 84
End: 2019-02-05
Payer: COMMERCIAL

## 2019-02-05 VITALS
TEMPERATURE: 97.9 F | BODY MASS INDEX: 33.9 KG/M2 | HEIGHT: 67 IN | WEIGHT: 216 LBS | DIASTOLIC BLOOD PRESSURE: 86 MMHG | SYSTOLIC BLOOD PRESSURE: 142 MMHG | RESPIRATION RATE: 16 BRPM

## 2019-02-05 DIAGNOSIS — R26.9 GAIT ABNORMALITY: Primary | ICD-10-CM

## 2019-02-05 DIAGNOSIS — Z98.2 S/P VP SHUNT: ICD-10-CM

## 2019-02-05 DIAGNOSIS — G91.2 NORMAL PRESSURE HYDROCEPHALUS (HCC): Primary | ICD-10-CM

## 2019-02-05 PROCEDURE — 99214 OFFICE O/P EST MOD 30 MIN: CPT | Performed by: NEUROLOGICAL SURGERY

## 2019-02-05 PROCEDURE — 97161 PT EVAL LOW COMPLEX 20 MIN: CPT | Performed by: PHYSICAL THERAPIST

## 2019-02-05 NOTE — PROGRESS NOTES
Office Note - Neurosurgery   Mayra Anaya 81 y o  male MRN: [id number]      Assessment:    Patient is stable  50-year-old gentleman post insertion of 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  He has ongoing improvement in gait and balance  He is pleased with his quality of life and level of function  Would not recommend any adjustment to a shunt at present  He will follow-up in 1 year's time with the integrated normal pressure hydrocephalus program with repeat PT and cognitive assessment  History, physical examination and diagnostic tests were reviewed and questions answered  Diagnosis, care plan and treatment options were discussed  The patient understand instructions and will follow up as directed  Plan:    Follow-up:   1 year    Problem List Items Addressed This Visit        Nervous and Auditory    Normal pressure hydrocephalus - Primary       Other    S/P  shunt          Subjective/Objective     Chief Complaint    None  HPI    Approximately 6 months post insertion of 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  He continues to notice improvement in his gait and balance  He performs an hour of PT a day at home and has built a small obstacle course in his basement to allow to further work on his balance  He denies any difficulties with bladder function or cognition  He is very pleased with the results of surgery  JOVITA HUSSEIN personally reviewed  Review of Systems   HENT: Negative  Eyes: Negative  Respiratory: Positive for cough (related to allergies)  Cardiovascular: Negative  Gastrointestinal: Negative  Genitourinary: Negative  Musculoskeletal: Negative  Skin: Negative  Neurological: Positive for weakness (uses cane) and headaches (related to allergies )  Hematological: Bruises/bleeds easily (full dose ASA daily)  Psychiatric/Behavioral: Negative          Family History    Family History   Problem Relation Age of Onset    Hodgkin's lymphoma Father     Cancer Brother        Social History    Social History     Social History    Marital status: /Civil Union     Spouse name: N/A    Number of children: N/A    Years of education: N/A     Occupational History    Not on file       Social History Main Topics    Smoking status: Former Smoker     Packs/day: 6 00     Years: 20 00     Quit date: 4/23/1978    Smokeless tobacco: Never Used    Alcohol use 1 2 oz/week     2 Glasses of wine per week      Comment: 3-4 times per week    Drug use: No    Sexual activity: No     Other Topics Concern    Not on file     Social History Narrative    No narrative on file       Past Medical History    Past Medical History:   Diagnosis Date    Allergic     Arthritis     Coronary artery disease     GERD (gastroesophageal reflux disease)     History of heart artery stent 2001    x2    History of transfusion 1988    Hydrocephalus     Hyperlipidemia     Hypertension     Obesity     Visual impairment        Surgical History    Past Surgical History:   Procedure Laterality Date    CARDIAC SURGERY      CATARACT EXTRACTION, BILATERAL Bilateral 2014    CA CREATE SHUNT:VENTRIC-PERITONEAL Right 7/11/2018    Procedure: FRONTAL VENTRICULOPERITONEAL SHUNT INSERTION;  Surgeon: Leatha Verma MD;  [facility]: [facility];  Service: Neurosurgery    ROTATOR CUFF REPAIR Right 2016       Medications      Current Outpatient Prescriptions:     amLODIPine (NORVASC) 5 mg tablet, , Disp: , Rfl:     aspirin (ECOTRIN) 325 mg EC tablet, Take 325 mg by mouth every 6 (six) hours as needed for mild pain, Disp: , Rfl:     Cetirizine HCl (ZYRTEC ALLERGY) 10 MG CAPS, Take by mouth, Disp: , Rfl:     Cholecalciferol (VITAMIN D) 2000 units CAPS, Take by mouth, Disp: , Rfl:     cilostazol (PLETAL) 100 mg tablet, TAKE 1 TABLET TWICE A DAY, Disp: 180 tablet, Rfl: 3    Fluticasone Propionate (FLONASE NA), into each nostril, Disp: , Rfl:     losartan (COZAAR) 50 mg tablet, , Disp: , Rfl:     metoprolol succinate (TOPROL XL) 50 mg 24 hr tablet, Take by mouth, Disp: , Rfl:     Multiple Vitamin (MULTIVITAMIN) capsule, Take 1 capsule by mouth daily, Disp: , Rfl:     Omega-3 1000 MG CAPS, Take by mouth, Disp: , Rfl:     Omega-3 Fatty Acids (FISH OIL) 1,000 mg, Take 1,000 mg by mouth 2 (two) times a day, Disp: , Rfl:     psyllium (METAMUCIL) 58 6 % packet, Take 1 packet by mouth daily, Disp: , Rfl:     rosuvastatin (CRESTOR) 10 MG tablet, Take by mouth, Disp: , Rfl:     Allergies    No Known Allergies    The following portions of the patient's history were reviewed and updated as appropriate: allergies, current medications, past family history, past medical history, past social history, past surgical history and problem list     Investigations    I personally reviewed the NPH PT and NPH Cognitive results with the patient:    NPH scale dated 2/5/2019, 14/15  PT gait assessment dated 2/5/2019  TUG 14 sec , TUGc 15 sec, DGI 20/24   MoCA dated 2/5/2019, 26/30  Physical Exam    Vitals:  Blood pressure 142/86, temperature 97 9 °F (36 6 °C), resp  rate 16, height 5' 7" (1 702 m), weight 98 kg (216 lb)  ,Body mass index is 33 83 kg/m²  Physical Exam   Constitutional: He is oriented to person, place, and time  He appears well-developed and well-nourished  No distress  Eyes: EOM are normal    Pulmonary/Chest: Effort normal  No respiratory distress  Abdominal: Soft  There is no tenderness  There is no guarding  Neurological: He is alert and oriented to person, place, and time  No cranial nerve deficit  Walks with a steady but mildly wide-based gait  On bloc turn in 4 steps  Skin: Skin is warm and dry  Shunt reservoir depresses and refills easily  Psychiatric: He has a normal mood and affect  His behavior is normal    Vitals reviewed  Neurologic Exam     Mental Status   Oriented to person, place, and time       Cranial Nerves     CN III, IV, VI   Extraocular motions are normal

## 2019-02-05 NOTE — PROGRESS NOTES
PT Evaluation     Today's date: 2019  Patient name: Radha Hein  : 1935  MRN: [id number]  Referring provider: Vika Frey MD  Dx:   Encounter Diagnosis     ICD-10-CM    1  Gait abnormality R26 9                   Assessment  Assessment details: Pt presents with minimal to nil risk for falls given today's objective findings and subjective history  Pt at this time has a safe home set up, local family support and likely does not need PT intervention at this time  If he is to have a decline in any form he is welcome to return as needed or if he is to follow up with Dr Neena Madison for another gait assessment  Thank you very much for this kind and motivated referral       Goals  STG 4 weeks:  1  Follow up as needed  LTG 8 weeks:  1  Follow up as needed  Plan  Plan details: No further follow up needed at this time  Duration in weeks: 6  Treatment plan discussed with: patient        Subjective Evaluation    History of Present Illness  Date of onset: 2019  Mechanism of injury: Pt is an 81 yomale who lives with his wife who he is the primary caretaker who lives in a multi level house but the first floor is completely set up with bed and bathroom  Pt reports that there are 2 BREEZY with Railing  Pt reports that he is feeling very well as a whole after just completing PT in November of Last year  He states that he has not had any falls and has been using an [address] only as needed in the community predominantly for endurance but does within his home  Pt reports as a whole he is feeling well and is exercising 6 days a week  Pt reports to Dr Neena Madison later today as a normal check up for his 6 months  Pt offers no other concerns at this time and he has a son that lives in [address] [place] [address] if something were to happen to him or his family     Quality of life: good    Pain  No pain reported    Social Support  Lives in: multiple-level home (first floor set up )    Treatments  Previous treatment: physical therapy  Patient Goals  Patient goals for therapy: improved balance (Endurance)          Objective     Ambulation     Comments   TU 89"  TUG Cog: 15 02" with 100% recall  DGI:   For all other details please refer to NPH form                Precautions: Falls hx, HTN, NPH S/P  Shunt

## 2019-02-05 NOTE — LETTER
February 5, 2019     Stella Serrano  [address] [facility] [address]    Patient: Mayra Anaya   YOB: 1935   Date of Visit: 2/5/2019       Dear Dr Lares Doing: Thank you for referring Lindsey Encarnacion to me for evaluation  Below are my notes for this consultation  If you have questions, please do not hesitate to call me  I look forward to following your patient along with you  Sincerely,        Barrett Haney MD        CC: No Recipients  Barrett Haney MD  2/5/2019 12:16 PM  Sign at close encounter  Office Note - Neurosurgery   Mayra Anaya 81 y o  male MRN: [id number]      Assessment:    Patient is stable  52-year-old gentleman post insertion of 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  He has ongoing improvement in gait and balance  He is pleased with his quality of life and level of function  Would not recommend any adjustment to a shunt at present  He will follow-up in 1 year's time with the integrated normal pressure hydrocephalus program with repeat PT and cognitive assessment  History, physical examination and diagnostic tests were reviewed and questions answered  Diagnosis, care plan and treatment options were discussed  The patient understand instructions and will follow up as directed  Plan:    Follow-up:   1 year    Problem List Items Addressed This Visit        Nervous and Auditory    Normal pressure hydrocephalus - Primary       Other    S/P  shunt          Subjective/Objective     Chief Complaint    None  HPI    Approximately 6 months post insertion of 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  He continues to notice improvement in his gait and balance  He performs an hour of PT a day at home and has built a small obstacle course in his basement to allow to further work on his balance  He denies any difficulties with bladder function or cognition    He is very pleased with the results of surgery  ROS  ROS personally reviewed  Review of Systems   HENT: Negative  Eyes: Negative  Respiratory: Positive for cough (related to allergies)  Cardiovascular: Negative  Gastrointestinal: Negative  Genitourinary: Negative  Musculoskeletal: Negative  Skin: Negative  Neurological: Positive for weakness (uses cane) and headaches (related to allergies )  Hematological: Bruises/bleeds easily (full dose ASA daily)  Psychiatric/Behavioral: Negative  Family History    Family History   Problem Relation Age of Onset    Hodgkin's lymphoma Father     Cancer Brother        Social History    Social History     Social History    Marital status: /Civil Union     Spouse name: N/A    Number of children: N/A    Years of education: N/A     Occupational History    Not on file       Social History Main Topics    Smoking status: Former Smoker     Packs/day: 6 00     Years: 20 00     Quit date: 4/23/1978    Smokeless tobacco: Never Used    Alcohol use 1 2 oz/week     2 Glasses of wine per week      Comment: 3-4 times per week    Drug use: No    Sexual activity: No     Other Topics Concern    Not on file     Social History Narrative    No narrative on file       Past Medical History    Past Medical History:   Diagnosis Date    Allergic     Arthritis     Coronary artery disease     GERD (gastroesophageal reflux disease)     History of heart artery stent 2001    x2    History of transfusion 1988    Hydrocephalus     Hyperlipidemia     Hypertension     Obesity     Visual impairment        Surgical History    Past Surgical History:   Procedure Laterality Date    CARDIAC SURGERY      CATARACT EXTRACTION, BILATERAL Bilateral 2014    MA CREATE SHUNT:VENTRIC-PERITONEAL Right 7/11/2018    Procedure: FRONTAL VENTRICULOPERITONEAL SHUNT INSERTION;  Surgeon: Elvia Saleh MD;  [facility];  Service: Neurosurgery    ROTATOR CUFF REPAIR Right 2016 Medications      Current Outpatient Prescriptions:     amLODIPine (NORVASC) 5 mg tablet, , Disp: , Rfl:     aspirin (ECOTRIN) 325 mg EC tablet, Take 325 mg by mouth every 6 (six) hours as needed for mild pain, Disp: , Rfl:     Cetirizine HCl (ZYRTEC ALLERGY) 10 MG CAPS, Take by mouth, Disp: , Rfl:     Cholecalciferol (VITAMIN D) 2000 units CAPS, Take by mouth, Disp: , Rfl:     cilostazol (PLETAL) 100 mg tablet, TAKE 1 TABLET TWICE A DAY, Disp: 180 tablet, Rfl: 3    Fluticasone Propionate (FLONASE NA), into each nostril, Disp: , Rfl:     losartan (COZAAR) 50 mg tablet, , Disp: , Rfl:     metoprolol succinate (TOPROL XL) 50 mg 24 hr tablet, Take by mouth, Disp: , Rfl:     Multiple Vitamin (MULTIVITAMIN) capsule, Take 1 capsule by mouth daily, Disp: , Rfl:     Omega-3 1000 MG CAPS, Take by mouth, Disp: , Rfl:     Omega-3 Fatty Acids (FISH OIL) 1,000 mg, Take 1,000 mg by mouth 2 (two) times a day, Disp: , Rfl:     psyllium (METAMUCIL) 58 6 % packet, Take 1 packet by mouth daily, Disp: , Rfl:     rosuvastatin (CRESTOR) 10 MG tablet, Take by mouth, Disp: , Rfl:     Allergies    No Known Allergies    The following portions of the patient's history were reviewed and updated as appropriate: allergies, current medications, past family history, past medical history, past social history, past surgical history and problem list     Investigations    I personally reviewed the NPH PT and NPH Cognitive results with the patient:    NPH scale dated 2/5/2019, 14/15  PT gait assessment dated 2/5/2019  TUG 14 sec , TUGc 15 sec, DGI 20/24   MoCA dated 2/5/2019, 26/30  Physical Exam    Vitals:  Blood pressure 142/86, temperature 97 9 °F (36 6 °C), resp  rate 16, height 5' 7" (1 702 m), weight 98 kg (216 lb)  ,Body mass index is 33 83 kg/m²  Physical Exam   Constitutional: He is oriented to person, place, and time  He appears well-developed and well-nourished  No distress     Eyes: EOM are normal  Pulmonary/Chest: Effort normal  No respiratory distress  Abdominal: Soft  There is no tenderness  There is no guarding  Neurological: He is alert and oriented to person, place, and time  No cranial nerve deficit  Walks with a steady but mildly wide-based gait  On bloc turn in 4 steps  Skin: Skin is warm and dry  Shunt reservoir depresses and refills easily  Psychiatric: He has a normal mood and affect  His behavior is normal    Vitals reviewed  Neurologic Exam     Mental Status   Oriented to person, place, and time       Cranial Nerves     CN III, IV, VI   Extraocular motions are normal

## 2019-02-27 NOTE — PROGRESS NOTES
PT Discharge    Today's date: 2019  Patient name: Halima Cox  : 1935  MRN: [id number]  Referring provider: Suleman Elkins MD  Dx:   Encounter Diagnosis     ICD-10-CM    1  Gait abnormality R26 9 PT plan of care cert/re-cert       Start Time:   Stop Time:   Total time in clinic (min): 25 minutes    Assessment/Plan Pt has not been present since 19  Pt's chart will be DC in compliance of facility policy as all Charts are DC within 30 days of last scheduled visit          Subjective    Objective

## 2019-04-29 ENCOUNTER — TELEPHONE (OUTPATIENT)
Dept: NEUROSURGERY | Facility: CLINIC | Age: 84
End: 2019-04-29

## 2019-05-30 DIAGNOSIS — I73.9 CLAUDICATION IN PERIPHERAL VASCULAR DISEASE (HCC): ICD-10-CM

## 2019-06-02 RX ORDER — CILOSTAZOL 100 MG/1
TABLET ORAL
Qty: 180 TABLET | Refills: 3 | Status: SHIPPED | OUTPATIENT
Start: 2019-06-02 | End: 2020-05-10

## 2020-01-08 ENCOUNTER — TRANSCRIBE ORDERS (OUTPATIENT)
Dept: NEUROSURGERY | Facility: CLINIC | Age: 85
End: 2020-01-08

## 2020-01-08 ENCOUNTER — TELEPHONE (OUTPATIENT)
Dept: NEUROSURGERY | Facility: CLINIC | Age: 85
End: 2020-01-08

## 2020-01-08 DIAGNOSIS — R26.9 GAIT DISTURBANCE: Primary | ICD-10-CM

## 2020-01-08 NOTE — TELEPHONE ENCOUNTER
Patient scheduled for the following appts:    Tuesday, Feb 4th (SLT):    PT @ 10am    DR @ 11am    Confirmed over the phone with patient

## 2020-01-08 NOTE — TELEPHONE ENCOUNTER
----- Message from Abebe Mahajan RN sent at 2/5/2019 11:31 AM EST -----  Regarding: NPH- 1 yr f/u (due in Feb 2020)  Call patient to schedule NPH- 1 yr f/u (due in Feb 2020)

## 2020-01-08 NOTE — TELEPHONE ENCOUNTER
[facility]     [address] [address]    Phone:  [phone]    Fax:  [phone]       Thank You for choosing us for your health care visit. We are glad to serve you and happy to provide you with this summary of your visit. Please help us to ensure we have accurate records. If you find anything that needs to be changed, please let our staff know as soon as possible.          Your Demographic Information     Patient Name Sex Christine Viramontes Female 1988       Ethnic Group Patient Race    Not of  or  Origin White      Your Visit Details     Date & Time Provider Department    2017 11:00 AM FILI OB 1 Nurse [facility]       Your Upcoming Appointment*(Max 10)     2017  9:30 AM CDT   Injection with FILI OB 1 NURSE   [facility]  ([facility])    [address] [place] [address]   [phone]              Your Vitals Were     Smoking Status                   Never Smoker           Medications Prescribed or Re-Ordered Today     None      Your Current Medications Are        Disp Refills Start End    medroxyPROGESTERone (DEPO-PROVERA) 150 MG/ML injection        Sig - Route: Inject 150 mg into the muscle every 3 months. - Intramuscular    Class: Historical Med      Allergies     Nickel RASH      Immunizations History as of 2017     Name Date    DTaP 1994, 1990, 1990, 1990, 1989    Depo-provera 2015  9:42 AM, 10/27/2014, 2014    HIB 2002    HPV QUAD 2009, 2008, 2008    Hepatitis B Child 2002, 2001, 7/3/2001    MMR 1994, 1990    Meningococcal Conjugate MCV4P (Menactra) 2008    Polio, ORAL 1994, 1991, 1990, 1989    Tdap 2008            Patient Instructions     None       Attempted to reach patient to schedule 1 yr f/u through [facility] with no answer  LMOM for call back at his convenience

## 2020-02-04 ENCOUNTER — OFFICE VISIT (OUTPATIENT)
Dept: NEUROSURGERY | Facility: CLINIC | Age: 85
End: 2020-02-04
Payer: COMMERCIAL

## 2020-02-04 ENCOUNTER — OFFICE VISIT (OUTPATIENT)
Dept: PHYSICAL THERAPY | Facility: CLINIC | Age: 85
End: 2020-02-04
Payer: COMMERCIAL

## 2020-02-04 VITALS
DIASTOLIC BLOOD PRESSURE: 72 MMHG | WEIGHT: 215 LBS | BODY MASS INDEX: 33.74 KG/M2 | HEIGHT: 67 IN | RESPIRATION RATE: 16 BRPM | TEMPERATURE: 98.5 F | SYSTOLIC BLOOD PRESSURE: 140 MMHG

## 2020-02-04 DIAGNOSIS — G91.2 NORMAL PRESSURE HYDROCEPHALUS (HCC): Primary | ICD-10-CM

## 2020-02-04 DIAGNOSIS — R26.9 GAIT DISTURBANCE: Primary | ICD-10-CM

## 2020-02-04 PROCEDURE — 99215 OFFICE O/P EST HI 40 MIN: CPT | Performed by: NEUROLOGICAL SURGERY

## 2020-02-04 PROCEDURE — 97161 PT EVAL LOW COMPLEX 20 MIN: CPT | Performed by: PHYSICAL THERAPIST

## 2020-02-04 NOTE — PROGRESS NOTES
PT Evaluation  and PT Discharge    Today's date: 2020  Patient name: Gertrude Callahan  : 1935  MRN: [id number]  Referring provider: Audra Olguin MD  Dx:   Encounter Diagnosis     ICD-10-CM    1  Gait disturbance R26 9 Ambulatory referral to Physical Therapy                  Assessment  Assessment details: Pt presents with minimal to nil risk for falls given today's objective findings and subjective history  Pt at this time has a safe home set up is independent in his HEP and does not need PT intervention at this time  If he is to have a decline in any form he is welcome to return as needed or if he is to follow up with Dr Cristine Camarillo for another gait assessment  Thank you very much for this kind and motivated referral       Goals  STG 4 weeks:  1  Follow up as needed  LTG 8 weeks:  1  Follow up as needed  Plan  Plan details: No further follow up needed at this time  Duration in weeks: 6  Treatment plan discussed with: patient        Subjective Evaluation    History of Present Illness  Date of onset: 2019  Mechanism of injury: Pt is an 80 yo male who lives with his wife who he is the primary caretaker who lives in a multi level house in an adult community but the first floor is completely set up with bed and bathroom  He had a shunt placement on 2018  To get to the basement he has a chair lift  He states he feels infirm  Pt reports that there are 2 BREEZY with Railing and does not have much trouble  He states that he has not had any falls and has been using an [facility] only as needed in the community predominantly for endurance but does within his home  He uses recumbent bike 50 mins/day 6 days a week as well as his PT exercises    Quality of life: good    Pain  No pain reported    Social Support  Lives in: multiple-level home (first floor set up )    Treatments  Previous treatment: physical therapy  Patient Goals  Patient goals for therapy: improved balance (Endurance)          Objective Ambulation     Comments   TU 46"  R foot slide  TUG Co 01" with 100% recall  DGI:   For all other details please refer to NPH form      Retropulsion:-          Precautions: Falls hx, HTN, NPH S/P  Shunt

## 2020-02-04 NOTE — PROGRESS NOTES
Office Note - Neurosurgery   Gertrude Callahan 81 y o  male MRN: [id number]      Assessment:    Patient is stable  41-year-old gentleman approximately 1 year post insertion of 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  He is doing well clinically with seen improvement in gait and balance and urinary incontinence  No signs or symptoms of shunt malfunction  He will follow-up in 1 year's time with repeat PT and cognitive assessment through the integrated normal pressure hydrocephalus program       History, physical examination and diagnostic tests were reviewed and questions answered  Diagnosis, care plan and treatment options were discussed  The patient understand instructions and will follow up as directed  Plan:    Follow-up:  1 year    Problem List Items Addressed This Visit        Nervous and Auditory    Normal pressure hydrocephalus (HCC) - Primary          Subjective/Objective     Chief Complaint    None, follow-up for NPH  HPI    Pleasant 41-year-old gentleman post insertion of 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  He is approximately 1 year post surgery and reports sustained improvement in gait and balance and urinary incontinence  He denies any cognitive changes  Denies any difficulties with the shunt over the incisions  Overall he is very pleased with his quality of life and level of function  JOVITA HUSSEIN personally reviewed and updated  Review of Systems   Constitutional: Negative  HENT: Negative  Eyes: Negative  Respiratory: Positive for shortness of breath (with exertion)  Cardiovascular: Negative  Gastrointestinal: Negative  Endocrine: Negative  Genitourinary: Negative  Musculoskeletal: Negative  Skin: Negative  Allergic/Immunologic: Positive for environmental allergies  Neurological: Positive for weakness (uses cane)  Hematological: Bruises/bleeds easily (aspirin)     Psychiatric/Behavioral: Positive for confusion (notices slight worsening of short-term memory)  Family History    Family History   Problem Relation Age of Onset    Hodgkin's lymphoma Father     Cancer Brother        Social History    Social History     Socioeconomic History    Marital status: /Civil Union     Spouse name: Not on file    Number of children: Not on file    Years of education: Not on file    Highest education level: Not on file   Occupational History    Not on file   Social Needs    Financial resource strain: Not on file    Food insecurity:     Worry: Not on file     Inability: Not on file    Transportation needs:     Medical: Not on file     Non-medical: Not on file   Tobacco Use    Smoking status: Former Smoker     Packs/day: 6 00     Years: 20 00     Pack years: 120 00     Last attempt to quit: 1978     Years since quittin 8    Smokeless tobacco: Never Used   Substance and Sexual Activity    Alcohol use:  Yes     Alcohol/week: 2 0 standard drinks     Types: 2 Glasses of wine per week     Comment: 3-4 times per week    Drug use: No    Sexual activity: Never     Partners: Female   Lifestyle    Physical activity:     Days per week: Not on file     Minutes per session: Not on file    Stress: Not on file   Relationships    Social connections:     Talks on phone: Not on file     Gets together: Not on file     Attends [employer] service: Not on file     Active member of club or organization: Not on file     Attends meetings of clubs or organizations: Not on file     Relationship status: Not on file    Intimate partner violence:     Fear of current or ex partner: Not on file     Emotionally abused: Not on file     Physically abused: Not on file     Forced sexual activity: Not on file   Other Topics Concern    Not on file   Social History Narrative    Not on file       Past Medical History    Past Medical History:   Diagnosis Date    Allergic     Arthritis     Coronary artery disease     GERD (gastroesophageal reflux disease)     History of heart artery stent 2001    x2    History of transfusion 1988    Hydrocephalus ([address] )     Hyperlipidemia     Hypertension     Obesity     Visual impairment        Surgical History    Past Surgical History:   Procedure Laterality Date    CARDIAC SURGERY      CATARACT EXTRACTION, BILATERAL Bilateral 2014    [place] CREATE SHUNT:VENTRIC-PERITONEAL Right 7/11/2018    Procedure: FRONTAL VENTRICULOPERITONEAL SHUNT INSERTION;  Surgeon: Angle Chavez MD;  [facility];  Service: Neurosurgery    ROTATOR CUFF REPAIR Right 2016       Medications      Current Outpatient Medications:     amLODIPine (NORVASC) 5 mg tablet, , Disp: , Rfl:     aspirin (ECOTRIN) 325 mg EC tablet, Take 325 mg by mouth every 6 (six) hours as needed for mild pain, Disp: , Rfl:     Cetirizine HCl (ZYRTEC ALLERGY) 10 MG CAPS, Take by mouth, Disp: , Rfl:     Cholecalciferol (VITAMIN D) 2000 units CAPS, Take by mouth, Disp: , Rfl:     cilostazol (PLETAL) 100 mg tablet, TAKE 1 TABLET TWICE A DAY, Disp: 180 tablet, Rfl: 3    losartan (COZAAR) 50 mg tablet, , Disp: , Rfl:     metoprolol succinate (TOPROL XL) 50 mg 24 hr tablet, Take by mouth, Disp: , Rfl:     Multiple Vitamin (MULTIVITAMIN) capsule, Take 1 capsule by mouth daily, Disp: , Rfl:     Omega-3 1000 MG CAPS, Take by mouth, Disp: , Rfl:     Omega-3 Fatty Acids (FISH OIL) 1,000 mg, Take 1,000 mg by mouth 2 (two) times a day, Disp: , Rfl:     psyllium (METAMUCIL) 58 6 % packet, Take 1 packet by mouth daily, Disp: , Rfl:     rosuvastatin (CRESTOR) 10 MG tablet, Take by mouth, Disp: , Rfl:     Fluticasone Propionate (FLONASE NA), into each nostril, Disp: , Rfl:     Allergies    No Known Allergies    The following portions of the patient's history were reviewed and updated as appropriate: allergies, current medications, past family history, past medical history, past social history, past surgical history and problem list     Investigations    I personally reviewed the NPH PT and NPH Cognitive results with the patient:    NPH scale dated 2/4/2020, 13/15  PT gait assessment dated 2/4/2020  TUG 14 sec , TUGc 18 sec, DGI 21/24   MoCA dated 2/4/2020, 28/30  Physical Exam    Vitals:  Blood pressure 140/72, temperature 98 5 °F (36 9 °C), resp  rate 16, height 5' 7" (1 702 m), weight 97 5 kg (215 lb)  ,Body mass index is 33 67 kg/m²  Physical Exam   Constitutional: He is oriented to person, place, and time  He appears well-developed and well-nourished  No distress  HENT:   Head: Atraumatic  Eyes: EOM are normal    Pulmonary/Chest: Effort normal  No respiratory distress  Musculoskeletal: He exhibits no deformity  Neurological: He is alert and oriented to person, place, and time  No cranial nerve deficit  Walks with a steady but mildly shuffling gait  Skin: Skin is warm and dry  Shunt depresses and refills easily  Psychiatric: He has a normal mood and affect  His behavior is normal    Vitals reviewed  Neurologic Exam     Mental Status   Oriented to person, place, and time       Cranial Nerves     CN III, IV, VI   Extraocular motions are normal

## 2020-05-09 DIAGNOSIS — I73.9 CLAUDICATION IN PERIPHERAL VASCULAR DISEASE (HCC): ICD-10-CM

## 2020-05-10 RX ORDER — CILOSTAZOL 100 MG/1
TABLET ORAL
Qty: 180 TABLET | Refills: 3 | Status: SHIPPED | OUTPATIENT
Start: 2020-05-10 | End: 2021-02-11 | Stop reason: SDUPTHER

## 2020-11-06 ENCOUNTER — TRANSCRIBE ORDERS (OUTPATIENT)
Dept: ADMINISTRATIVE | Facility: HOSPITAL | Age: 85
End: 2020-11-06

## 2020-11-06 DIAGNOSIS — I34.0 NONRHEUMATIC MITRAL (VALVE) INSUFFICIENCY: Primary | ICD-10-CM

## 2020-11-13 ENCOUNTER — HOSPITAL ENCOUNTER (OUTPATIENT)
Dept: NON INVASIVE DIAGNOSTICS | Facility: HOSPITAL | Age: 85
Discharge: HOME | End: 2020-11-13
Payer: COMMERCIAL

## 2020-11-13 DIAGNOSIS — I34.0 NONRHEUMATIC MITRAL (VALVE) INSUFFICIENCY: ICD-10-CM

## 2020-11-13 PROCEDURE — 93306 TTE W/DOPPLER COMPLETE: CPT

## 2020-11-13 PROCEDURE — 93306 TTE W/DOPPLER COMPLETE: CPT | Performed by: INTERNAL MEDICINE

## 2021-01-12 ENCOUNTER — TRANSCRIBE ORDERS (OUTPATIENT)
Dept: NEUROSURGERY | Facility: CLINIC | Age: 86
End: 2021-01-12

## 2021-01-12 ENCOUNTER — TELEPHONE (OUTPATIENT)
Dept: NEUROSURGERY | Facility: CLINIC | Age: 86
End: 2021-01-12

## 2021-01-12 DIAGNOSIS — R26.9 GAIT DISTURBANCE: Primary | ICD-10-CM

## 2021-01-13 NOTE — TELEPHONE ENCOUNTER
Patient returned my call and he is scheduled for Tuesday, 2/16/21  Patient appreciative for the coordination

## 2021-01-25 ENCOUNTER — IMMUNIZATIONS (OUTPATIENT)
Dept: FAMILY MEDICINE CLINIC | Facility: HOSPITAL | Age: 86
End: 2021-01-25

## 2021-01-25 DIAGNOSIS — Z23 ENCOUNTER FOR IMMUNIZATION: Primary | ICD-10-CM

## 2021-01-25 PROCEDURE — 0011A SARS-COV-2 / COVID-19 MRNA VACCINE (MODERNA) 100 MCG: CPT

## 2021-01-25 PROCEDURE — 91301 SARS-COV-2 / COVID-19 MRNA VACCINE (MODERNA) 100 MCG: CPT

## 2021-02-06 ENCOUNTER — APPOINTMENT (EMERGENCY)
Dept: CT IMAGING | Facility: HOSPITAL | Age: 86
DRG: 565 | End: 2021-02-06
Payer: COMMERCIAL

## 2021-02-06 ENCOUNTER — HOSPITAL ENCOUNTER (INPATIENT)
Facility: HOSPITAL | Age: 86
LOS: 1 days | Discharge: HOME HEALTH | DRG: 565 | End: 2021-02-08
Attending: EMERGENCY MEDICINE | Admitting: INTERNAL MEDICINE
Payer: COMMERCIAL

## 2021-02-06 ENCOUNTER — APPOINTMENT (EMERGENCY)
Dept: RADIOLOGY | Facility: HOSPITAL | Age: 86
DRG: 565 | End: 2021-02-06
Payer: COMMERCIAL
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
## 2021-02-06 DIAGNOSIS — S43.004A SHOULDER DISLOCATION, RIGHT, INITIAL ENCOUNTER: ICD-10-CM

## 2021-02-06 DIAGNOSIS — T79.6XXA TRAUMATIC RHABDOMYOLYSIS, INITIAL ENCOUNTER (HCC): ICD-10-CM

## 2021-02-06 DIAGNOSIS — R26.2 AMBULATORY DYSFUNCTION: ICD-10-CM

## 2021-02-06 DIAGNOSIS — S43.014A ANTERIOR DISLOCATION OF RIGHT SHOULDER: ICD-10-CM

## 2021-02-06 DIAGNOSIS — R74.8 ELEVATED CK: ICD-10-CM
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
## 2021-02-06 PROBLEM — R65.10 SIRS (SYSTEMIC INFLAMMATORY RESPONSE SYNDROME) (HCC): Status: ACTIVE | Noted: 2021-02-06

## 2021-02-06 PROBLEM — M62.82 RHABDOMYOLYSIS: Status: ACTIVE | Noted: 2021-02-06

## 2021-02-06 LAB
ALBUMIN SERPL BCP-MCNC: 4 G/DL (ref 3.5–5)
ALP SERPL-CCNC: 84 U/L (ref 46–116)
ALT SERPL W P-5'-P-CCNC: 38 U/L (ref 12–78)
ANION GAP SERPL CALCULATED.3IONS-SCNC: 15 MMOL/L (ref 4–13)
APTT PPP: 29 SECONDS (ref 23–37)
AST SERPL W P-5'-P-CCNC: 41 U/L (ref 5–45)
BASOPHILS # BLD AUTO: 0.04 THOUSANDS/ΜL (ref 0–0.1)
BASOPHILS NFR BLD AUTO: 0 % (ref 0–1)
BILIRUB SERPL-MCNC: 0.58 MG/DL (ref 0.2–1)
BUN SERPL-MCNC: 8 MG/DL (ref 5–25)
CALCIUM SERPL-MCNC: 8.8 MG/DL (ref 8.3–10.1)
CHLORIDE SERPL-SCNC: 98 MMOL/L (ref 100–108)
CK MB SERPL-MCNC: 5.5 NG/ML (ref 0–5)
CK MB SERPL-MCNC: <1 % (ref 0–2.5)
CK SERPL-CCNC: 1219 U/L (ref 39–308)
CO2 SERPL-SCNC: 21 MMOL/L (ref 21–32)
CREAT SERPL-MCNC: 1.25 MG/DL (ref 0.6–1.3)
EOSINOPHIL # BLD AUTO: 0 THOUSAND/ΜL (ref 0–0.61)
EOSINOPHIL NFR BLD AUTO: 0 % (ref 0–6)
ERYTHROCYTE [DISTWIDTH] IN BLOOD BY AUTOMATED COUNT: 13.2 % (ref 11.6–15.1)
GFR SERPL CREATININE-BSD FRML MDRD: 52 ML/MIN/1.73SQ M
GLUCOSE SERPL-MCNC: 158 MG/DL (ref 65–140)
HCT VFR BLD AUTO: 46 % (ref 36.5–49.3)
HGB BLD-MCNC: 15.5 G/DL (ref 12–17)
IMM GRANULOCYTES # BLD AUTO: 0.08 THOUSAND/UL (ref 0–0.2)
IMM GRANULOCYTES NFR BLD AUTO: 0 % (ref 0–2)
INR PPP: 0.99 (ref 0.84–1.19)
LYMPHOCYTES # BLD AUTO: 1.13 THOUSANDS/ΜL (ref 0.6–4.47)
LYMPHOCYTES NFR BLD AUTO: 6 % (ref 14–44)
MAGNESIUM SERPL-MCNC: 1.8 MG/DL (ref 1.6–2.6)
MCH RBC QN AUTO: 31.3 PG (ref 26.8–34.3)
MCHC RBC AUTO-ENTMCNC: 33.7 G/DL (ref 31.4–37.4)
MCV RBC AUTO: 93 FL (ref 82–98)
MONOCYTES # BLD AUTO: 1 THOUSAND/ΜL (ref 0.17–1.22)
MONOCYTES NFR BLD AUTO: 5 % (ref 4–12)
NEUTROPHILS # BLD AUTO: 16.79 THOUSANDS/ΜL (ref 1.85–7.62)
NEUTS SEG NFR BLD AUTO: 89 % (ref 43–75)
NRBC BLD AUTO-RTO: 0 /100 WBCS
PLATELET # BLD AUTO: 339 THOUSANDS/UL (ref 149–390)
PMV BLD AUTO: 9.4 FL (ref 8.9–12.7)
POTASSIUM SERPL-SCNC: 3.9 MMOL/L (ref 3.5–5.3)
PROT SERPL-MCNC: 8.2 G/DL (ref 6.4–8.2)
PROTHROMBIN TIME: 13.2 SECONDS (ref 11.6–14.5)
RBC # BLD AUTO: 4.95 MILLION/UL (ref 3.88–5.62)
SODIUM SERPL-SCNC: 134 MMOL/L (ref 136–145)
TROPONIN I SERPL-MCNC: <0.02 NG/ML
WBC # BLD AUTO: 19.04 THOUSAND/UL (ref 4.31–10.16)

## 2021-02-06 PROCEDURE — 84484 ASSAY OF TROPONIN QUANT: CPT | Performed by: EMERGENCY MEDICINE

## 2021-02-06 PROCEDURE — 73020 X-RAY EXAM OF SHOULDER: CPT

## 2021-02-06 PROCEDURE — 99219 PR INITIAL OBSERVATION CARE/DAY 50 MINUTES: CPT | Performed by: NURSE PRACTITIONER

## 2021-02-06 PROCEDURE — 23650 CLTX SHO DSLC W/MNPJ WO ANES: CPT | Performed by: EMERGENCY MEDICINE

## 2021-02-06 PROCEDURE — 96361 HYDRATE IV INFUSION ADD-ON: CPT

## 2021-02-06 PROCEDURE — 70450 CT HEAD/BRAIN W/O DYE: CPT

## 2021-02-06 PROCEDURE — 85730 THROMBOPLASTIN TIME PARTIAL: CPT | Performed by: EMERGENCY MEDICINE

## 2021-02-06 PROCEDURE — 82553 CREATINE MB FRACTION: CPT | Performed by: EMERGENCY MEDICINE

## 2021-02-06 PROCEDURE — 99285 EMERGENCY DEPT VISIT HI MDM: CPT | Performed by: EMERGENCY MEDICINE

## 2021-02-06 PROCEDURE — 83735 ASSAY OF MAGNESIUM: CPT | Performed by: EMERGENCY MEDICINE

## 2021-02-06 PROCEDURE — 99285 EMERGENCY DEPT VISIT HI MDM: CPT

## 2021-02-06 PROCEDURE — 85025 COMPLETE CBC W/AUTO DIFF WBC: CPT | Performed by: EMERGENCY MEDICINE

## 2021-02-06 PROCEDURE — 82550 ASSAY OF CK (CPK): CPT | Performed by: EMERGENCY MEDICINE

## 2021-02-06 PROCEDURE — 93005 ELECTROCARDIOGRAM TRACING: CPT

## 2021-02-06 PROCEDURE — 80053 COMPREHEN METABOLIC PANEL: CPT | Performed by: EMERGENCY MEDICINE

## 2021-02-06 PROCEDURE — 36415 COLL VENOUS BLD VENIPUNCTURE: CPT | Performed by: EMERGENCY MEDICINE

## 2021-02-06 PROCEDURE — 85610 PROTHROMBIN TIME: CPT | Performed by: EMERGENCY MEDICINE

## 2021-02-06 PROCEDURE — 96376 TX/PRO/DX INJ SAME DRUG ADON: CPT

## 2021-02-06 PROCEDURE — G1004 CDSM NDSC: HCPCS

## 2021-02-06 PROCEDURE — 96374 THER/PROPH/DIAG INJ IV PUSH: CPT

## 2021-02-06 PROCEDURE — 73030 X-RAY EXAM OF SHOULDER: CPT

## 2021-02-06 PROCEDURE — 0RSJXZZ REPOSITION RIGHT SHOULDER JOINT, EXTERNAL APPROACH: ICD-10-PCS | Performed by: EMERGENCY MEDICINE

## 2021-02-06 PROCEDURE — 96375 TX/PRO/DX INJ NEW DRUG ADDON: CPT

## 2021-02-06 PROCEDURE — 99152 MOD SED SAME PHYS/QHP 5/>YRS: CPT | Performed by: EMERGENCY MEDICINE

## 2021-02-06 RX ORDER — SODIUM CHLORIDE, SODIUM LACTATE, POTASSIUM CHLORIDE, CALCIUM CHLORIDE 600; 310; 30; 20 MG/100ML; MG/100ML; MG/100ML; MG/100ML
75 INJECTION, SOLUTION INTRAVENOUS CONTINUOUS
Status: DISCONTINUED | OUTPATIENT
Start: 2021-02-06 | End: 2021-02-08 | Stop reason: HOSPADM

## 2021-02-06 RX ORDER — FENTANYL CITRATE 50 UG/ML
50 INJECTION, SOLUTION INTRAMUSCULAR; INTRAVENOUS ONCE
Status: COMPLETED | OUTPATIENT
Start: 2021-02-06 | End: 2021-02-06

## 2021-02-06 RX ORDER — ONDANSETRON 2 MG/ML
4 INJECTION INTRAMUSCULAR; INTRAVENOUS ONCE
Status: COMPLETED | OUTPATIENT
Start: 2021-02-06 | End: 2021-02-06

## 2021-02-06 RX ORDER — LIDOCAINE HYDROCHLORIDE 10 MG/ML
10 INJECTION, SOLUTION EPIDURAL; INFILTRATION; INTRACAUDAL; PERINEURAL ONCE
Status: COMPLETED | OUTPATIENT
Start: 2021-02-06 | End: 2021-02-06

## 2021-02-06 RX ORDER — METOPROLOL TARTRATE 5 MG/5ML
2.5 INJECTION INTRAVENOUS ONCE
Status: COMPLETED | OUTPATIENT
Start: 2021-02-06 | End: 2021-02-07

## 2021-02-06 RX ORDER — PROPOFOL 10 MG/ML
200 INJECTION, EMULSION INTRAVENOUS ONCE
Status: COMPLETED | OUTPATIENT
Start: 2021-02-06 | End: 2021-02-06

## 2021-02-06 RX ADMIN — FENTANYL CITRATE 50 MCG: 50 INJECTION INTRAMUSCULAR; INTRAVENOUS at 18:59

## 2021-02-06 RX ADMIN — LIDOCAINE HYDROCHLORIDE 10 ML: 10 INJECTION, SOLUTION EPIDURAL; INFILTRATION; INTRACAUDAL; PERINEURAL at 20:06

## 2021-02-06 RX ADMIN — SODIUM CHLORIDE 1000 ML: 0.9 INJECTION, SOLUTION INTRAVENOUS at 20:37

## 2021-02-06 RX ADMIN — ONDANSETRON 4 MG: 2 INJECTION INTRAMUSCULAR; INTRAVENOUS at 20:37

## 2021-02-06 RX ADMIN — FENTANYL CITRATE 50 MCG: 50 INJECTION INTRAMUSCULAR; INTRAVENOUS at 19:46

## 2021-02-06 RX ADMIN — PROPOFOL 200 MG: 10 INJECTION, EMULSION INTRAVENOUS at 20:59

## 2021-02-06 RX ADMIN — SODIUM CHLORIDE 1000 ML: 0.9 INJECTION, SOLUTION INTRAVENOUS at 18:59

## 2021-02-06 NOTE — ED PROVIDER NOTES
History  Chief Complaint   Patient presents with    Fall     Pt presents to the ED post fall, last night  Pt reports walking when he tripped and fell to the side  c/o of right shoulder pain  Denies head injury, denies LOC, pt reports unable to get off the floor from last night until 1700 when son came to help him up  History provided by:  Patient and relative   used: No    81-year-old male brought for evaluation after tripping and falling last night, injuring his right arm and being unable to get off the floor all day until help arrived at his home this afternoon  He believes he remembers the fall  States he was walking to the bathroom  Lives with wife but unfortunately she has significant medical problems and did not realize that he was on the ground  He denies head strike  Denies any other injury other than his right shoulder  Takes aspirin  No anticoagulation  Specifically denies headache, neck pain, chest pain, abdominal pain, nausea, vomiting, visual changes  Unable to range right shoulder  He has normal radial pulse, strength, sensation distally  Plan x-ray, pain control, labs and will re-evaluate  Prior to Admission Medications   Prescriptions Last Dose Informant Patient Reported? Taking?    Cetirizine HCl (ZYRTEC ALLERGY) 10 MG CAPS   Yes No   Sig: Take by mouth   Cholecalciferol (VITAMIN D) 2000 units CAPS   Yes No   Sig: Take by mouth   Fluticasone Propionate (FLONASE NA)   Yes No   Sig: into each nostril   Multiple Vitamin (MULTIVITAMIN) capsule   Yes No   Sig: Take 1 capsule by mouth daily   Omega-3 1000 MG CAPS   Yes No   Sig: Take by mouth   Omega-3 Fatty Acids (FISH OIL) 1,000 mg  Self Yes No   Sig: Take 1,000 mg by mouth 2 (two) times a day   amLODIPine (NORVASC) 5 mg tablet   Yes No   aspirin (ECOTRIN) 325 mg EC tablet   Yes No   Sig: Take 325 mg by mouth every 6 (six) hours as needed for mild pain   cilostazol (PLETAL) 100 mg tablet   No No   Sig: TAKE 1 TABLET TWICE A DAY   losartan (COZAAR) 50 mg tablet   Yes No   metoprolol succinate (TOPROL XL) 50 mg 24 hr tablet   Yes No   Sig: Take by mouth   psyllium (METAMUCIL) 58 6 % packet   Yes No   Sig: Take 1 packet by mouth daily   rosuvastatin (CRESTOR) 10 MG tablet   Yes No   Sig: Take by mouth      Facility-Administered Medications: None       Past Medical History:   Diagnosis Date    Allergic     Arthritis     Coronary artery disease     GERD (gastroesophageal reflux disease)     History of heart artery stent 2001    x2    History of transfusion 1988    Hydrocephalus ([address] )     Hyperlipidemia     Hypertension     Obesity     Visual impairment        Past Surgical History:   Procedure Laterality Date    CARDIAC SURGERY      CATARACT EXTRACTION, BILATERAL Bilateral     ME CREATE SHUNT:VENTRIC-PERITONEAL Right 2018    Procedure: FRONTAL VENTRICULOPERITONEAL SHUNT INSERTION;  Surgeon: Randy Ganser, MD;  Location[facility];  Service: Neurosurgery    ROTATOR CUFF REPAIR Right 2016       Family History   Problem Relation Age of Onset    Hodgkin's lymphoma Father     Cancer Brother      I have reviewed and agree with the history as documented  E-Cigarette/Vaping    E-Cigarette Use Never User      E-Cigarette/Vaping Substances     Social History     Tobacco Use    Smoking status: Former Smoker     Packs/day: 6 00     Years: 20 00     Pack years: 120 00     Quit date: 1978     Years since quittin 8    Smokeless tobacco: Never Used   Substance Use Topics    Alcohol use: Yes     Alcohol/week: 2 0 standard drinks     Types: 2 Glasses of wine per week     Comment: 3-4 times per week    Drug use: No       Review of Systems   Constitutional: Negative for activity change, appetite change, fatigue and fever  Respiratory: Negative for cough, chest tightness and shortness of breath  Gastrointestinal: Negative for abdominal pain, nausea and vomiting     Musculoskeletal: Negative for back pain and neck pain  Skin: Negative for color change and wound  Neurological: Negative for dizziness, weakness and headaches  All other systems reviewed and are negative  Physical Exam  Physical Exam  Vitals signs and nursing note reviewed  Constitutional:       Appearance: Normal appearance  HENT:      Head: Normocephalic and atraumatic  Eyes:      Extraocular Movements: Extraocular movements intact  Pupils: Pupils are equal, round, and reactive to light  Neck:      Musculoskeletal: Normal range of motion and neck supple  Comments: No C-spine, T-spine, L-spine tenderness  Cardiovascular:      Rate and Rhythm: Normal rate and regular rhythm  Pulses: Normal pulses  Heart sounds: Normal heart sounds  Pulmonary:      Effort: Pulmonary effort is normal       Breath sounds: Normal breath sounds  Chest:      Chest wall: No tenderness  Abdominal:      General: There is no distension  Palpations: Abdomen is soft  Tenderness: There is no abdominal tenderness  Musculoskeletal:      Comments: Deformity right shoulder, unable to range  Normal motion at the elbow, wrist   No tenderness distally  Skin:     General: Skin is warm and dry  Capillary Refill: Capillary refill takes less than 2 seconds  Neurological:      General: No focal deficit present  Mental Status: He is alert and oriented to person, place, and time     Psychiatric:         Mood and Affect: Mood normal          Behavior: Behavior normal          Vital Signs  ED Triage Vitals   Temperature Pulse Respirations Blood Pressure SpO2   02/06/21 1837 02/06/21 1835 02/06/21 1835 02/06/21 1835 02/06/21 1835   98 8 °F (37 1 °C) (!) 127 20 154/81 97 %      Temp Source Heart Rate Source Patient Position - Orthostatic VS BP Location FiO2 (%)   02/06/21 1837 02/06/21 1835 02/06/21 1835 02/06/21 1835 --   Temporal Monitor Sitting Right arm       Pain Score       02/06/21 1835       Worst Possible Pain Vitals:    02/07/21 0645 02/07/21 0831 02/07/21 1036 02/07/21 1052   BP: 135/78 (!) 180/96 145/63 145/80   Pulse: 78  94 99   Patient Position - Orthostatic VS:   Sitting Lying         Visual Acuity  Visual Acuity      Most Recent Value   L Pupil Size (mm)  2   R Pupil Size (mm)  2   L Pupil Shape  Round   R Pupil Shape  Round          ED Medications  Medications   amLODIPine (NORVASC) tablet 5 mg (5 mg Oral Given 2/7/21 0831)   loratadine (CLARITIN) tablet 10 mg (10 mg Oral Given 2/7/21 0833)   cholecalciferol (VITAMIN D3) tablet 2,000 Units (2,000 Units Oral Given 2/7/21 0832)   cilostazol (PLETAL) tablet 100 mg (100 mg Oral Given 2/7/21 0833)   fluticasone (FLONASE) 50 mcg/act nasal spray 1 spray (1 spray Each Nare Given 2/7/21 0836)   metoprolol succinate (TOPROL-XL) 24 hr tablet 50 mg (50 mg Oral Given 2/7/21 0833)   multivitamin stress formula tablet 1 tablet (1 tablet Oral Given 2/7/21 0833)   fish oil capsule 1,000 mg (1,000 mg Oral Given 2/7/21 0831)   psyllium (METAMUCIL) 1 packet (1 packet Oral Given 2/7/21 0955)   oxyCODONE (ROXICODONE) IR tablet 2 5 mg (has no administration in time range)   docusate sodium (COLACE) capsule 100 mg (has no administration in time range)   melatonin tablet 3 mg (has no administration in time range)   lactated ringers infusion (75 mL/hr Intravenous New Bag 2/7/21 1055)   heparin (porcine) subcutaneous injection 5,000 Units (5,000 Units Subcutaneous Given 2/7/21 1019)   aspirin (ECOTRIN) EC tablet 325 mg (325 mg Oral Given 2/7/21 0832)   acetaminophen (TYLENOL) tablet 650 mg (has no administration in time range)   losartan (COZAAR) tablet 50 mg (has no administration in time range)   sodium chloride 0 9 % bolus 1,000 mL (0 mL Intravenous Stopped 2/6/21 2006)   fentanyl citrate (PF) 100 MCG/2ML 50 mcg (50 mcg Intravenous Given 2/6/21 1859)   lidocaine (PF) (XYLOCAINE-MPF) 1 % injection 10 mL (10 mL Infiltration Given 2/6/21 2006)   fentanyl citrate (PF) 100 MCG/2ML 50 mcg (50 mcg Intravenous Given 2/6/21 1946)   sodium chloride 0 9 % bolus 1,000 mL (0 mL Intravenous Stopped 2/6/21 2207)   propofol (DIPRIVAN) 200 MG/20ML bolus injection 200 mg (200 mg Intravenous Given 2/6/21 2059)   ondansetron (ZOFRAN) injection 4 mg (4 mg Intravenous Given 2/6/21 2037)   metoprolol (LOPRESSOR) injection 2 5 mg (2 5 mg Intravenous Given 2/7/21 0116)       Diagnostic Studies  Results Reviewed     Procedure Component Value Units Date/Time    CKMB [[id number]]  (Abnormal) Collected: 02/07/21 0434    Lab Status: Final result Specimen: Blood from Arm, Left Updated: 02/07/21 0603     CK-MB Index <1 0 %      CK-MB 5 8 ng/mL     CK (with reflex to MB) [[id number]]  (Abnormal) Collected: 02/07/21 0434    Lab Status: Final result Specimen: Blood from Arm, Left Updated: 02/07/21 0524     Total CK 1,293 U/L     Basic metabolic panel [[id number]]  (Abnormal) Collected: 02/07/21 0434    Lab Status: Final result Specimen: Blood from Arm, Left Updated: 02/07/21 0524     Sodium 135 mmol/L      Potassium 3 9 mmol/L      Chloride 102 mmol/L      CO2 24 mmol/L      ANION GAP 9 mmol/L      BUN 9 mg/dL      Creatinine 0 95 mg/dL      Glucose 116 mg/dL      Calcium 8 3 mg/dL      eGFR 73 ml/min/1 73sq m     Narrative:      [place] guidelines for Chronic Kidney Disease (CKD):     Stage 1 with normal or high GFR (GFR > 90 mL/min/1 73 square meters)    Stage 2 Mild CKD (GFR = 60-89 mL/min/1 73 square meters)    Stage 3A Moderate CKD (GFR = 45-59 mL/min/1 73 square meters)    Stage 3B Moderate CKD (GFR = 30-44 mL/min/1 73 square meters)    Stage 4 Severe CKD (GFR = 15-29 mL/min/1 73 square meters)    Stage 5 End Stage CKD (GFR <15 mL/min/1 73 square meters)  Note: GFR calculation is accurate only with a steady state creatinine    CBC and differential [[id number]]  (Abnormal) Collected: 02/07/21 0434    Lab Status: Final result Specimen: Blood from Arm, Left Updated: 02/07/21 0446     WBC 11 90 Thousand/uL      RBC 4 30 Million/uL      Hemoglobin 13 4 g/dL      Hematocrit 40 8 %      MCV 95 fL      MCH 31 2 pg      MCHC 32 8 g/dL      RDW 13 5 %      MPV 9 2 fL      Platelets 419 Thousands/uL      nRBC 0 /100 WBCs      Neutrophils Relative 76 %      Immat GRANS % 0 %      Lymphocytes Relative 14 %      Monocytes Relative 10 %      Eosinophils Relative 0 %      Basophils Relative 0 %      Neutrophils Absolute 8 94 Thousands/µL      Immature Grans Absolute 0 04 Thousand/uL      Lymphocytes Absolute 1 65 Thousands/µL      Monocytes Absolute 1 22 Thousand/µL      Eosinophils Absolute 0 01 Thousand/µL      Basophils Absolute 0 04 Thousands/µL     Procalcitonin [[id number]] Collected: 02/07/21 0434    Lab Status:  In process Specimen: Blood from Arm, Left Updated: 02/07/21 0443    UA (URINE) with reflex to Scope [[id number]]     Lab Status: No result Specimen: Urine     CKMB [[id number]]  (Abnormal) Collected: 02/06/21 1901    Lab Status: Final result Specimen: Blood from Arm, Left Updated: 02/06/21 1950     CK-MB Index <1 0 %      CK-MB 5 5 ng/mL     CK Total with Reflex CKMB [[id number]]  (Abnormal) Collected: 02/06/21 1901    Lab Status: Final result Specimen: Blood from Arm, Left Updated: 02/06/21 1949     Total CK 1,219 U/L     Comprehensive metabolic panel [[id number]]  (Abnormal) Collected: 02/06/21 1901    Lab Status: Final result Specimen: Blood from Arm, Left Updated: 02/06/21 1949     Sodium 134 mmol/L      Potassium 3 9 mmol/L      Chloride 98 mmol/L      CO2 21 mmol/L      ANION GAP 15 mmol/L      BUN 8 mg/dL      Creatinine 1 25 mg/dL      Glucose 158 mg/dL      Calcium 8 8 mg/dL      AST 41 U/L      ALT 38 U/L      Alkaline Phosphatase 84 U/L      Total Protein 8 2 g/dL      Albumin 4 0 g/dL      Total Bilirubin 0 58 mg/dL      eGFR 52 ml/min/1 73sq m     Narrative:      [place] guidelines for Chronic Kidney Disease (CKD):     Stage 1 with normal or high GFR (GFR > 90 mL/min/1 73 square meters)    Stage 2 Mild CKD (GFR = 60-89 mL/min/1 73 square meters)    Stage 3A Moderate CKD (GFR = 45-59 mL/min/1 73 square meters)    Stage 3B Moderate CKD (GFR = 30-44 mL/min/1 73 square meters)    Stage 4 Severe CKD (GFR = 15-29 mL/min/1 73 square meters)    Stage 5 End Stage CKD (GFR <15 mL/min/1 73 square meters)  Note: GFR calculation is accurate only with a steady state creatinine    Magnesium [[id number]]  (Normal) Collected: 02/06/21 1901    Lab Status: Final result Specimen: Blood from Arm, Left Updated: 02/06/21 1949     Magnesium 1 8 mg/dL     Troponin I [[id number]]  (Normal) Collected: 02/06/21 1901    Lab Status: Final result Specimen: Blood from Arm, Left Updated: 02/06/21 1931     Troponin I <0 02 ng/mL     Protime-INR [[id number]]  (Normal) Collected: 02/06/21 1901    Lab Status: Final result Specimen: Blood from Arm, Left Updated: 02/06/21 1925     Protime 13 2 seconds      INR 0 99    APTT [[id number]]  (Normal) Collected: 02/06/21 1901    Lab Status: Final result Specimen: Blood from Arm, Left Updated: 02/06/21 1925     PTT 29 seconds     CBC and differential [[id number]]  (Abnormal) Collected: 02/06/21 1901    Lab Status: Final result Specimen: Blood from Arm, Left Updated: 02/06/21 1911     WBC 19 04 Thousand/uL      RBC 4 95 Million/uL      Hemoglobin 15 5 g/dL      Hematocrit 46 0 %      MCV 93 fL      MCH 31 3 pg      MCHC 33 7 g/dL      RDW 13 2 %      MPV 9 4 fL      Platelets 165 Thousands/uL      nRBC 0 /100 WBCs      Neutrophils Relative 89 %      Immat GRANS % 0 %      Lymphocytes Relative 6 %      Monocytes Relative 5 %      Eosinophils Relative 0 %      Basophils Relative 0 %      Neutrophils Absolute 16 79 Thousands/µL      Immature Grans Absolute 0 08 Thousand/uL      Lymphocytes Absolute 1 13 Thousands/µL      Monocytes Absolute 1 00 Thousand/µL      Eosinophils Absolute 0 00 Thousand/µL      Basophils Absolute 0 04 Thousands/µL                  CT head without contrast   Final Result by Marjorie Richter MD (02/06 2238)      No acute intracranial abnormality  Stable position of a right frontal ventriculostomy shunt catheter with the tip at the body the right lateral ventricle  The size of the ventricular system has not significantly changed when compared to CT brain dated August 6, 2018  Moderate chronic small vessel ischemic changes  Workstation performed: [id number]         XR shoulder 1 vw RIGHT POST REDUCTION   Final Result by Denys Arzola DO (02/07 1424)      Appropriate alignment of the glenohumeral joint post reduction  Persistent Hill-Sachs deformity of the humeral head  Workstation performed: [id number]         XR shoulder 2+ views RIGHT   ED Interpretation by Karen Reynolds MD (02/06 1933)   Anterior dislocation  No fracture  Final Result by Cielo Campo MD (02/07 1258)      Anterior subcoracoid dislocation  The emergency room physician caring for the patient is aware of the diagnosis              Workstation performed: [id number]                    Procedures  ECG 12 Lead Documentation Only    Date/Time: 2/6/2021 7:46 PM  Performed by: Karen Reynolds MD  Authorized by: Karen Reynolds MD     Indications / Diagnosis:  Lightheadedness  ECG reviewed by me, the ED Provider: yes    Patient location:  ED  Previous ECG:     Previous ECG:  Unavailable  Rate:     ECG rate:  118  Rhythm:     Rhythm: sinus tachycardia    Ectopy:     Ectopy: none    QRS:     QRS axis:  Normal  Conduction:     Conduction: normal    ST segments:     ST segments:  Normal  T waves:     T waves: normal    Pre-Procedural Sedation  Performed by: Karen Reynolds MD  Authorized by: Karen Reynolds MD     Consent:     Consent obtained:  Written    Consent given by:  Patient  Universal protocol:     Patient identity confirmation method:  Verbally with patient  Indications:     Sedation purpose:  Dislocation reduction Procedure necessitating sedation performed by:  Physician performing sedation    Intended level of sedation:  Moderate (conscious sedation)  Pre-sedation assessment:     ASA classification: class 2 - patient with mild systemic disease      Mallampati score:  III - soft palate, base of uvula visible    Pre-sedation assessments completed and reviewed: airway patency, cardiovascular function, hydration status, mental status, nausea/vomiting, pain level, respiratory function and temperature      History of difficult intubation: no      Pre-sedation assessment completed:  2/6/2021 7:49 PM  Procedural Sedation    Date/Time: 2/6/2021 8:39 PM  Performed by: Florida Cabot, MD  Authorized by: Florida Cabot, MD     Immediate pre-procedure details:     Reassessment: Patient reassessed immediately prior to procedure      Reviewed: vital signs, relevant labs/tests and NPO status      Verified: bag valve mask available, emergency equipment available, intubation equipment available, IV patency confirmed, oxygen available and suction available    Procedure details (see MAR for exact dosages):     Preoxygenation:  Nasal cannula    Sedation:  Propofol    Analgesia:  Fentanyl    Intra-procedure monitoring:  Blood pressure monitoring, continuous capnometry, continuous pulse oximetry, frequent LOC assessments, frequent vital sign checks and cardiac monitor    Intra-procedure events: none      Total sedation time (minutes):  15  Post-procedure details:     Attendance: Constant attendance by certified staff until patient recovered      Recovery: Patient returned to pre-procedure baseline      Post-sedation assessments completed and reviewed: airway patency, cardiovascular function, hydration status, mental status, nausea/vomiting, pain level, respiratory function and temperature      Patient tolerance:   Tolerated well, no immediate complications  Orthopedic injury treatment    Date/Time: 2/6/2021 9:00 PM  Performed by: Govind Frederick Nellie Staples MD  Authorized by: Segundo Waller MD     Patient Location:  ED  Other Assisting Provider: Yes (comment) Fani Walker, [facility])    Written consent obtained?: Yes    Patient identity confirmed:  Verbally with patient  Injury location:  Shoulder  Location details:  Right shoulder  Injury type:  Dislocation  Dislocation type: anterior    Chronicity:  New  Hill-Sachs deformity?: No    Neurovascular status: Neurovascularly intact    Distal perfusion: normal    Neurological function: normal    Range of motion: reduced    Local anesthesia used?: Yes    Anesthesia:  Local infiltration  Local anesthetic:  Lidocaine 1% without epinephrine  Sedation type: Moderate (conscious) sedation (See separate Procedural Sedation form)  Manipulation performed?: Yes    Skeletal traction used?: Yes    Reduction successful?: Yes    Confirmation: Reduction confirmed by x-ray    Immobilization:  Sling  Neurovascular status: Neurovascularly intact    Distal perfusion: normal    Neurological function: normal    Patient tolerance:  Patient tolerated the procedure well with no immediate complications             ED Course  ED Course as of Feb 07 1515   Sat Feb 06, 2021 2022 Attempted shoulder reduction with local anesthetic only  Unsuccessful  Will proceed conscious sedation for closed reduction of shoulder dislocation  MDM  Number of Diagnoses or Management Options  Anterior dislocation of right shoulder: new and requires workup  Elevated CK: new and requires workup  Fall from slip, trip, or stumble: new and requires workup  Diagnosis management comments: 60-year-old male with trip and fall last night injuring right shoulder, unable to get up on his own remaining on the floor all day until help arrived  Denies head strike  Takes aspirin  No complaints other than right shoulder pain  On x-ray was found to be anteriorly dislocated  Unable to reduce with local anesthesia only    After sedation with propofol, shoulder was reduced without complication  Patient woke and remained having normal neurologic function  His lab work was notable for elevated CK and creatinine and he remained persistently tachycardic  Admitted for IV fluids, pain control and PT evaluation  Walks with a walker over long distances but often does not use it in the home  Amount and/or Complexity of Data Reviewed  Clinical lab tests: ordered and reviewed  Tests in the radiology section of CPT®: ordered and reviewed  Obtain history from someone other than the patient: yes  Discuss the patient with other providers: yes  Independent visualization of images, tracings, or specimens: yes    Patient Progress  Patient progress: improved      Disposition  Final diagnoses:   Fall from slip, trip, or stumble   Elevated CK   Anterior dislocation of right shoulder     Time reflects when diagnosis was documented in both MDM as applicable and the Disposition within this note     Time User Action Codes Description Comment    2/6/2021  9:14 PM Vitor Hernandez [W01  0XXA] Fall from slip, trip, or stumble     2/6/2021  9:15 PM Corey Carrilloter Add [R74 8] Elevated CK     2/6/2021  9:15 PM Corey Farfan Add [S43 014A] Anterior dislocation of right shoulder       ED Disposition     ED Disposition Condition Date/Time Comment    Admit Stable Sat Feb 6, 2021 10:42 PM Case was discussed with Fatemeh Vee and the patient's admission status was agreed to be Admission Status: observation status to the service of Dr Roberto Ames           Follow-up Information    None         Current Discharge Medication List      CONTINUE these medications which have NOT CHANGED    Details   amLODIPine (NORVASC) 5 mg tablet       aspirin (ECOTRIN) 325 mg EC tablet Take 325 mg by mouth every 6 (six) hours as needed for mild pain      Cetirizine HCl (ZYRTEC ALLERGY) 10 MG CAPS Take by mouth      Cholecalciferol (VITAMIN D) 2000 units CAPS Take by mouth cilostazol (PLETAL) 100 mg tablet TAKE 1 TABLET TWICE A DAY  Qty: 180 tablet, Refills: 3    Associated Diagnoses: Claudication in peripheral vascular disease (HCC)      Fluticasone Propionate (FLONASE NA) into each nostril      losartan (COZAAR) 50 mg tablet       metoprolol succinate (TOPROL XL) 50 mg 24 hr tablet Take by mouth      Multiple Vitamin (MULTIVITAMIN) capsule Take 1 capsule by mouth daily      !! Omega-3 1000 MG CAPS Take by mouth      !! Omega-3 Fatty Acids (FISH OIL) 1,000 mg Take 1,000 mg by mouth 2 (two) times a day      psyllium (METAMUCIL) 58 6 % packet Take 1 packet by mouth daily      rosuvastatin (CRESTOR) 10 MG tablet Take by mouth       !! - Potential duplicate medications found  Please discuss with provider  No discharge procedures on file      PDMP Review     None          ED Provider  Electronically Signed by           Lisa Scott MD  02/07/21 6697

## 2021-02-07 LAB
ANION GAP SERPL CALCULATED.3IONS-SCNC: 9 MMOL/L (ref 4–13)
ATRIAL RATE: 115 BPM
ATRIAL RATE: 118 BPM
BASOPHILS # BLD AUTO: 0.04 THOUSANDS/ΜL (ref 0–0.1)
BASOPHILS NFR BLD AUTO: 0 % (ref 0–1)
BUN SERPL-MCNC: 9 MG/DL (ref 5–25)
CALCIUM SERPL-MCNC: 8.3 MG/DL (ref 8.3–10.1)
CHLORIDE SERPL-SCNC: 102 MMOL/L (ref 100–108)
CK MB SERPL-MCNC: 5.8 NG/ML (ref 0–5)
CK MB SERPL-MCNC: 7.5 NG/ML (ref 0–5)
CK MB SERPL-MCNC: <1 % (ref 0–2.5)
CK MB SERPL-MCNC: <1 % (ref 0–2.5)
CK SERPL-CCNC: 1293 U/L (ref 39–308)
CK SERPL-CCNC: 1712 U/L (ref 39–308)
CO2 SERPL-SCNC: 24 MMOL/L (ref 21–32)
CREAT SERPL-MCNC: 0.95 MG/DL (ref 0.6–1.3)
EOSINOPHIL # BLD AUTO: 0.01 THOUSAND/ΜL (ref 0–0.61)
EOSINOPHIL NFR BLD AUTO: 0 % (ref 0–6)
ERYTHROCYTE [DISTWIDTH] IN BLOOD BY AUTOMATED COUNT: 13.5 % (ref 11.6–15.1)
GFR SERPL CREATININE-BSD FRML MDRD: 73 ML/MIN/1.73SQ M
GLUCOSE SERPL-MCNC: 116 MG/DL (ref 65–140)
HCT VFR BLD AUTO: 40.8 % (ref 36.5–49.3)
HGB BLD-MCNC: 13.4 G/DL (ref 12–17)
IMM GRANULOCYTES # BLD AUTO: 0.04 THOUSAND/UL (ref 0–0.2)
IMM GRANULOCYTES NFR BLD AUTO: 0 % (ref 0–2)
LYMPHOCYTES # BLD AUTO: 1.65 THOUSANDS/ΜL (ref 0.6–4.47)
LYMPHOCYTES NFR BLD AUTO: 14 % (ref 14–44)
MCH RBC QN AUTO: 31.2 PG (ref 26.8–34.3)
MCHC RBC AUTO-ENTMCNC: 32.8 G/DL (ref 31.4–37.4)
MCV RBC AUTO: 95 FL (ref 82–98)
MONOCYTES # BLD AUTO: 1.22 THOUSAND/ΜL (ref 0.17–1.22)
MONOCYTES NFR BLD AUTO: 10 % (ref 4–12)
NEUTROPHILS # BLD AUTO: 8.94 THOUSANDS/ΜL (ref 1.85–7.62)
NEUTS SEG NFR BLD AUTO: 76 % (ref 43–75)
NRBC BLD AUTO-RTO: 0 /100 WBCS
P AXIS: 73 DEGREES
P AXIS: 75 DEGREES
PLATELET # BLD AUTO: 274 THOUSANDS/UL (ref 149–390)
PMV BLD AUTO: 9.2 FL (ref 8.9–12.7)
POTASSIUM SERPL-SCNC: 3.9 MMOL/L (ref 3.5–5.3)
PR INTERVAL: 188 MS
PR INTERVAL: 190 MS
PROCALCITONIN SERPL-MCNC: 0.05 NG/ML
QRS AXIS: 41 DEGREES
QRS AXIS: 55 DEGREES
QRSD INTERVAL: 84 MS
QRSD INTERVAL: 88 MS
QT INTERVAL: 290 MS
QT INTERVAL: 294 MS
QTC INTERVAL: 406 MS
QTC INTERVAL: 406 MS
RBC # BLD AUTO: 4.3 MILLION/UL (ref 3.88–5.62)
SODIUM SERPL-SCNC: 135 MMOL/L (ref 136–145)
T WAVE AXIS: 41 DEGREES
T WAVE AXIS: 68 DEGREES
VENTRICULAR RATE: 115 BPM
VENTRICULAR RATE: 118 BPM
WBC # BLD AUTO: 11.9 THOUSAND/UL (ref 4.31–10.16)

## 2021-02-07 PROCEDURE — 82553 CREATINE MB FRACTION: CPT | Performed by: NURSE PRACTITIONER

## 2021-02-07 PROCEDURE — 82550 ASSAY OF CK (CPK): CPT | Performed by: INTERNAL MEDICINE

## 2021-02-07 PROCEDURE — 36415 COLL VENOUS BLD VENIPUNCTURE: CPT | Performed by: NURSE PRACTITIONER

## 2021-02-07 PROCEDURE — 99225 PR SBSQ OBSERVATION CARE/DAY 25 MINUTES: CPT | Performed by: INTERNAL MEDICINE

## 2021-02-07 PROCEDURE — 84145 PROCALCITONIN (PCT): CPT | Performed by: NURSE PRACTITIONER

## 2021-02-07 PROCEDURE — 93010 ELECTROCARDIOGRAM REPORT: CPT | Performed by: INTERNAL MEDICINE

## 2021-02-07 PROCEDURE — 85025 COMPLETE CBC W/AUTO DIFF WBC: CPT | Performed by: NURSE PRACTITIONER

## 2021-02-07 PROCEDURE — 97163 PT EVAL HIGH COMPLEX 45 MIN: CPT

## 2021-02-07 PROCEDURE — 80048 BASIC METABOLIC PNL TOTAL CA: CPT | Performed by: NURSE PRACTITIONER

## 2021-02-07 PROCEDURE — 82553 CREATINE MB FRACTION: CPT | Performed by: INTERNAL MEDICINE

## 2021-02-07 PROCEDURE — 82550 ASSAY OF CK (CPK): CPT | Performed by: NURSE PRACTITIONER

## 2021-02-07 PROCEDURE — 97116 GAIT TRAINING THERAPY: CPT

## 2021-02-07 RX ORDER — CHLORAL HYDRATE 500 MG
1000 CAPSULE ORAL 2 TIMES DAILY
Status: DISCONTINUED | OUTPATIENT
Start: 2021-02-07 | End: 2021-02-08 | Stop reason: HOSPADM

## 2021-02-07 RX ORDER — ACETAMINOPHEN 325 MG/1
975 TABLET ORAL EVERY 8 HOURS SCHEDULED
Status: DISCONTINUED | OUTPATIENT
Start: 2021-02-07 | End: 2021-02-07

## 2021-02-07 RX ORDER — OXYCODONE HYDROCHLORIDE 5 MG/1
2.5 TABLET ORAL EVERY 6 HOURS PRN
Status: DISCONTINUED | OUTPATIENT
Start: 2021-02-07 | End: 2021-02-08 | Stop reason: HOSPADM

## 2021-02-07 RX ORDER — HYDROMORPHONE HCL/PF 1 MG/ML
0.2 SYRINGE (ML) INJECTION EVERY 6 HOURS PRN
Status: DISCONTINUED | OUTPATIENT
Start: 2021-02-07 | End: 2021-02-07

## 2021-02-07 RX ORDER — AMLODIPINE BESYLATE 5 MG/1
5 TABLET ORAL DAILY
Status: DISCONTINUED | OUTPATIENT
Start: 2021-02-07 | End: 2021-02-08

## 2021-02-07 RX ORDER — ASPIRIN 325 MG
325 TABLET, DELAYED RELEASE (ENTERIC COATED) ORAL DAILY
Status: DISCONTINUED | OUTPATIENT
Start: 2021-02-07 | End: 2021-02-08 | Stop reason: HOSPADM

## 2021-02-07 RX ORDER — FLUTICASONE PROPIONATE 50 MCG
1 SPRAY, SUSPENSION (ML) NASAL DAILY
Status: DISCONTINUED | OUTPATIENT
Start: 2021-02-07 | End: 2021-02-08 | Stop reason: HOSPADM

## 2021-02-07 RX ORDER — HEPARIN SODIUM 5000 [USP'U]/ML
5000 INJECTION, SOLUTION INTRAVENOUS; SUBCUTANEOUS EVERY 8 HOURS SCHEDULED
Status: DISCONTINUED | OUTPATIENT
Start: 2021-02-07 | End: 2021-02-08 | Stop reason: HOSPADM

## 2021-02-07 RX ORDER — MELATONIN
2000 DAILY
Status: DISCONTINUED | OUTPATIENT
Start: 2021-02-07 | End: 2021-02-08 | Stop reason: HOSPADM

## 2021-02-07 RX ORDER — DOCUSATE SODIUM 100 MG/1
100 CAPSULE, LIQUID FILLED ORAL 2 TIMES DAILY PRN
Status: DISCONTINUED | OUTPATIENT
Start: 2021-02-07 | End: 2021-02-08 | Stop reason: HOSPADM

## 2021-02-07 RX ORDER — ACETAMINOPHEN 325 MG/1
650 TABLET ORAL EVERY 6 HOURS PRN
Status: DISCONTINUED | OUTPATIENT
Start: 2021-02-07 | End: 2021-02-08 | Stop reason: HOSPADM

## 2021-02-07 RX ORDER — METOPROLOL SUCCINATE 50 MG/1
50 TABLET, EXTENDED RELEASE ORAL DAILY
Status: DISCONTINUED | OUTPATIENT
Start: 2021-02-07 | End: 2021-02-08 | Stop reason: HOSPADM

## 2021-02-07 RX ORDER — CILOSTAZOL 100 MG/1
100 TABLET ORAL 2 TIMES DAILY
Status: DISCONTINUED | OUTPATIENT
Start: 2021-02-07 | End: 2021-02-08 | Stop reason: HOSPADM

## 2021-02-07 RX ORDER — LOSARTAN POTASSIUM 50 MG/1
50 TABLET ORAL DAILY
Status: DISCONTINUED | OUTPATIENT
Start: 2021-02-07 | End: 2021-02-08 | Stop reason: HOSPADM

## 2021-02-07 RX ORDER — LANOLIN ALCOHOL/MO/W.PET/CERES
3 CREAM (GRAM) TOPICAL
Status: DISCONTINUED | OUTPATIENT
Start: 2021-02-07 | End: 2021-02-08 | Stop reason: HOSPADM

## 2021-02-07 RX ORDER — LORATADINE 10 MG/1
10 TABLET ORAL DAILY
Status: DISCONTINUED | OUTPATIENT
Start: 2021-02-07 | End: 2021-02-08 | Stop reason: HOSPADM

## 2021-02-07 RX ADMIN — METOPROLOL SUCCINATE 50 MG: 50 TABLET, EXTENDED RELEASE ORAL at 08:33

## 2021-02-07 RX ADMIN — ACETAMINOPHEN 975 MG: 325 TABLET, FILM COATED ORAL at 05:32

## 2021-02-07 RX ADMIN — HEPARIN SODIUM 5000 UNITS: 5000 INJECTION INTRAVENOUS; SUBCUTANEOUS at 02:24

## 2021-02-07 RX ADMIN — ACETAMINOPHEN 975 MG: 325 TABLET, FILM COATED ORAL at 10:19

## 2021-02-07 RX ADMIN — HEPARIN SODIUM 5000 UNITS: 5000 INJECTION INTRAVENOUS; SUBCUTANEOUS at 17:32

## 2021-02-07 RX ADMIN — PSYLLIUM HUSK 1 PACKET: 3.4 POWDER ORAL at 09:55

## 2021-02-07 RX ADMIN — OMEGA-3 FATTY ACIDS CAP 1000 MG 1000 MG: 1000 CAP at 17:17

## 2021-02-07 RX ADMIN — FLUTICASONE PROPIONATE 1 SPRAY: 50 SPRAY, METERED NASAL at 08:36

## 2021-02-07 RX ADMIN — SODIUM CHLORIDE, SODIUM LACTATE, POTASSIUM CHLORIDE, AND CALCIUM CHLORIDE 75 ML/HR: .6; .31; .03; .02 INJECTION, SOLUTION INTRAVENOUS at 01:21

## 2021-02-07 RX ADMIN — AMLODIPINE BESYLATE 5 MG: 5 TABLET ORAL at 08:31

## 2021-02-07 RX ADMIN — CILOSTAZOL 100 MG: 100 TABLET ORAL at 08:33

## 2021-02-07 RX ADMIN — CILOSTAZOL 100 MG: 100 TABLET ORAL at 17:17

## 2021-02-07 RX ADMIN — B-COMPLEX W/ C & FOLIC ACID TAB 1 TABLET: TAB at 08:33

## 2021-02-07 RX ADMIN — Medication 2000 UNITS: at 08:32

## 2021-02-07 RX ADMIN — ASPIRIN 325 MG: 325 TABLET, COATED ORAL at 08:32

## 2021-02-07 RX ADMIN — ACETAMINOPHEN 975 MG: 325 TABLET, FILM COATED ORAL at 02:24

## 2021-02-07 RX ADMIN — LORATADINE 10 MG: 10 TABLET ORAL at 08:33

## 2021-02-07 RX ADMIN — LOSARTAN POTASSIUM 50 MG: 50 TABLET, FILM COATED ORAL at 17:20

## 2021-02-07 RX ADMIN — OMEGA-3 FATTY ACIDS CAP 1000 MG 1000 MG: 1000 CAP at 08:31

## 2021-02-07 RX ADMIN — SODIUM CHLORIDE, SODIUM LACTATE, POTASSIUM CHLORIDE, AND CALCIUM CHLORIDE 75 ML/HR: .6; .31; .03; .02 INJECTION, SOLUTION INTRAVENOUS at 10:55

## 2021-02-07 RX ADMIN — METOROPROLOL TARTRATE 2.5 MG: 5 INJECTION, SOLUTION INTRAVENOUS at 01:16

## 2021-02-07 RX ADMIN — HEPARIN SODIUM 5000 UNITS: 5000 INJECTION INTRAVENOUS; SUBCUTANEOUS at 10:19

## 2021-02-07 NOTE — ED NOTES
Patient in reclining chair, call bell within reach     Dan Kehr, [address] [facility]  02/07/21 2494

## 2021-02-07 NOTE — H&P
H&P- Nolia Cease 1935, 81 y o  male MRN: [id number]    Unit/Bed#: ED 16 Encounter: [id number]    Primary Care Provider: Baljit Ambrosio MD   Date and time admitted to hospital: 2/6/2021  6:38 PM        * Rhabdomyolysis  Assessment & Plan  · CK 1200, secondary to fall  · Status post 2 L NS fluid bolus  Continue with LR at 75 cc/hour  · Will hold losartan for now pending repeat am labs, can likely resume in am  · Hold statin for now   · Recheck CK in AM     Fall  Assessment & Plan  · Presentation:  Mechanical fall on 02/05 to right shoulder  No head strikes  On cilostazol  Assisted off floor at 5:00 p m  On 02/06  · Head CT:  Negative  · X-ray right shoulder:  Dislocation  · PT consult    Shoulder dislocation, right, initial encounter  Assessment & Plan  · Secondary to fall that occurred on 02/05  Reduced in ED  · Pain control:  · Tylenol scheduled  · Oxycodone 2 5 mg q 6 p r n  · Dilaudid 0 2 mg q 6 p r n   · Encourage IS  · Sling to RUE    Ambulatory dysfunction  Assessment & Plan  · Patient ambulates with cane at baseline  Now status post right shoulder reduction  History of neuropathy,  shunt  · PT consult    SIRS (systemic inflammatory response syndrome) (HCC)  Assessment & Plan  · Sirs criteria:  Tachycardia, leukocytosis  Note, did miss AM dose of beta blocker on 02/06  · Afebrile without infectious complaints  · Check urinalysis  · EKG: obtain baseline  Appears sinus tach on tele   · Monitor off antibiotics  · Check procalcitonin  · IV Lopressor 2 5 mg x 1 dose now    S/P  shunt  Assessment & Plan  · Follows with Neurosurgery as outpatient  · Head CT:  Shunt in stable position    Essential hypertension  Assessment & Plan  · BP stable  · Home regimen:  Amlodipine, metoprolol, losartan  · Will hold losartan for now pending repeat bmp  · Check for orthostasis  · Echo November 2020:  EF 60%  G1 DD  Moderate aortic stenosis      Peripheral arterial disease ([facility] [address] )  Assessment & Plan  · Continue cilostazol  · Hold statin for now due to rhabdo       VTE Prophylaxis: Heparin  / reason for no mechanical VTE prophylaxis Low risk   Code Status:  Level 1  POLST: POLST is not applicable to this patient  Discussion with family:  Patient only  Declined update family     Anticipated Length of Stay:  Patient will be admitted on an Observation basis with an anticipated length of stay of  less than 2 midnights  Justification for Hospital Stay:  Rhabdomyolysis requiring IV hydration  PT consult    Total Time for Visit, including Counseling / Coordination of Care: 45 minutes  Greater than 50% of this total time spent on direct patient counseling and coordination of care  Chief Complaint:   Fall    History of Present Illness:    Joe Suggs is a 81 y o  male with past medical history significant for NPH status post  shunt, gait dysfunction-ambulates with cane, peripheral neuropathy, PAD, essential hypertension, moderate AS who presents following mechanical fall that occurred on 02/05  He was unable to get up by himself and slept on the floor overnight  His  arrived on 2/6 who called his family when patient did not answer the door  He remained on the floor from 2/5 until approximately 5pm on 2/6  Patient is right-handed and ambulates with his cane at night  He denies any precipitating symptoms  He denies any fevers or chills  No sick contacts  He was unable to take his medications on 02/06  On arrival to emergency department, patient met SIRS criteria  No infectious source identified  He complained of right shoulder pain prompting x-ray  Dislocation was noted  He underwent reduction in ED  Currently reports pain is controlled  Patient admitted as observation for IV fluids and PT consultation  Review of Systems:    Review of Systems   Musculoskeletal: Positive for arthralgias (R shoulder) and gait problem   Negative for back pain, joint swelling, myalgias, neck pain and neck stiffness  All other systems reviewed and are negative  Past Medical and Surgical History:     Past Medical History:   Diagnosis Date    Allergic     Arthritis     Coronary artery disease     GERD (gastroesophageal reflux disease)     History of heart artery stent 2001    x2    History of transfusion 1988    Hydrocephalus ([facility] [address] )     Hyperlipidemia     Hypertension     Obesity     Visual impairment        Past Surgical History:   Procedure Laterality Date    CARDIAC SURGERY      CATARACT EXTRACTION, BILATERAL Bilateral 2014    [place] CREATE SHUNT:VENTRIC-PERITONEAL Right 7/11/2018    Procedure: FRONTAL VENTRICULOPERITONEAL SHUNT INSERTION;  Surgeon: Linda Russo MD;  [facility];  Service: Neurosurgery    ROTATOR CUFF REPAIR Right 2016       Meds/Allergies:    Prior to Admission medications    Medication Sig Start Date End Date Taking?  Authorizing Provider   amLODIPine (NORVASC) 5 mg tablet  3/12/18   Historical Provider, MD   aspirin (ECOTRIN) 325 mg EC tablet Take 325 mg by mouth every 6 (six) hours as needed for mild pain    Historical Provider, MD   Cetirizine HCl (ZYRTEC ALLERGY) 10 MG CAPS Take by mouth    Historical Provider, MD   Cholecalciferol (VITAMIN D) 2000 units CAPS Take by mouth    Historical Provider, MD   cilostazol (PLETAL) 100 mg tablet TAKE 1 TABLET TWICE A DAY 5/10/20   Kyung Aceves MD   Fluticasone Propionate (FLONASE NA) into each nostril    Historical Provider, MD   losartan (COZAAR) 50 mg tablet  3/16/18   Historical Provider, MD   metoprolol succinate (TOPROL XL) 50 mg 24 hr tablet Take by mouth    Historical Provider, MD   Multiple Vitamin (MULTIVITAMIN) capsule Take 1 capsule by mouth daily    Historical Provider, MD   Henniker-3 1000 MG CAPS Take by mouth    Historical Provider, MD   Omega-3 Fatty Acids (FISH OIL) 1,000 mg Take 1,000 mg by mouth 2 (two) times a day    Historical Provider, MD   psyllium (METAMUCIL) 58 6 % packet Take 1 packet by mouth daily    Historical Provider, MD   rosuvastatin (CRESTOR) 10 MG tablet Take by mouth    Historical Provider, MD     I have reviewed home medications with patient personally  Allergies: No Known Allergies    Social History:     Marital Status: /Civil Union   Patient Pre-hospital Living Situation: home with wife  Patient Pre-hospital Level of Mobility: cane at times    Substance Use History:   Social History     Substance and Sexual Activity   Alcohol Use Yes    Alcohol/week: 2 0 standard drinks    Types: 2 Glasses of wine per week    Comment: 3-4 times per week     Social History     Tobacco Use   Smoking Status Former Smoker    Packs/day: 6 00    Years: 20 00    Pack years: 120 00    Quit date: 1978    Years since quittin 8   Smokeless Tobacco Never Used     Social History     Substance and Sexual Activity   Drug Use No       Family History:    non-contributory    Physical Exam:     Vitals:   Blood Pressure: 157/74 (21)  Pulse: (!) 116 (21)  Temperature: 98 8 °F (37 1 °C) (21)  Temp Source: Temporal (21)  Respirations: 18 (21)  Weight - Scale: 96 6 kg (213 lb) (21)  SpO2: 93 % (21)    Physical Exam  Constitutional:       General: He is not in acute distress  Appearance: Normal appearance  He is not ill-appearing  HENT:      Head: Normocephalic and atraumatic  Right Ear: External ear normal       Left Ear: External ear normal       Nose: Nose normal       Mouth/Throat:      Mouth: Mucous membranes are moist       Pharynx: Oropharynx is clear  Eyes:      General: No scleral icterus  Extraocular Movements: Extraocular movements intact  Conjunctiva/sclera: Conjunctivae normal       Pupils: Pupils are equal, round, and reactive to light  Neck:      Musculoskeletal: Normal range of motion and neck supple  No neck rigidity or muscular tenderness     Cardiovascular:      Rate and Rhythm: Regular rhythm  Tachycardia present  Pulses: Normal pulses  Heart sounds: Murmur present  Pulmonary:      Effort: Pulmonary effort is normal  No respiratory distress  Breath sounds: Normal breath sounds  No wheezing, rhonchi or rales  Chest:      Chest wall: No tenderness  Abdominal:      General: Bowel sounds are normal  There is no distension  Palpations: Abdomen is soft  Tenderness: There is no abdominal tenderness  There is no right CVA tenderness, left CVA tenderness, guarding or rebound  Musculoskeletal: Normal range of motion  Right lower leg: No edema  Left lower leg: No edema  Lymphadenopathy:      Cervical: No cervical adenopathy  Skin:     General: Skin is warm and dry  Capillary Refill: Capillary refill takes less than 2 seconds  Neurological:      General: No focal deficit present  Mental Status: He is alert and oriented to person, place, and time  Psychiatric:         Mood and Affect: Mood normal          Behavior: Behavior normal              Additional Data:     Lab Results: I have personally reviewed pertinent reports  Results from last 7 days   Lab Units 02/06/21  1901   WBC Thousand/uL 19 04*   HEMOGLOBIN g/dL 15 5   HEMATOCRIT % 46 0   PLATELETS Thousands/uL 339   NEUTROS PCT % 89*   LYMPHS PCT % 6*   MONOS PCT % 5   EOS PCT % 0     Results from last 7 days   Lab Units 02/06/21  1901   SODIUM mmol/L 134*   POTASSIUM mmol/L 3 9   CHLORIDE mmol/L 98*   CO2 mmol/L 21   BUN mg/dL 8   CREATININE mg/dL 1 25   ANION GAP mmol/L 15*   CALCIUM mg/dL 8 8   ALBUMIN g/dL 4 0   TOTAL BILIRUBIN mg/dL 0 58   ALK PHOS U/L 84   ALT U/L 38   AST U/L 41   GLUCOSE RANDOM mg/dL 158*     Results from last 7 days   Lab Units 02/06/21  1901   INR  0 99                   Imaging: I have personally reviewed pertinent reports  CT head without contrast   Final Result by Logan Dudley MD (02/06 2238)      No acute intracranial abnormality          Stable position of a right frontal ventriculostomy shunt catheter with the tip at the body the right lateral ventricle  The size of the ventricular system has not significantly changed when compared to CT brain dated August 6, 2018  Moderate chronic small vessel ischemic changes  Workstation performed: [id number]         XR shoulder 2+ views RIGHT   ED Interpretation by Arsenio Chun MD (02/06 1933)   Anterior dislocation  No fracture  XR shoulder 1 vw RIGHT POST REDUCTION    (Results Pending)       EKG, Pathology, and Other Studies Reviewed on Admission:   · EKG:  Obtain baseline now    Allscripts / Epic Records Reviewed: Yes     ** Please Note: This note has been constructed using a voice recognition system   **

## 2021-02-07 NOTE — ASSESSMENT & PLAN NOTE
· Secondary to fall that occurred on 02/05  Reduced in ED  · Pain control:  · Tylenol scheduled  · Oxycodone 2 5 mg q 6 p r n    · Dilaudid 0 2 mg q 6 p r n   · Encourage IS  · Sling to RUE

## 2021-02-07 NOTE — PHYSICAL THERAPY NOTE
PHYSICAL THERAPY EVALUATION  NAME:  Hanna Davis  DATE: 02/07/21    AGE:   81 y o    Mrn:   [id number]  ADMIT DX:  Shoulder injury [S49 90XA]    Past Medical History:   Diagnosis Date    Allergic     Arthritis     Coronary artery disease     GERD (gastroesophageal reflux disease)     History of heart artery stent 2001    x2    History of transfusion 1988    [facility]cephalus ([facility] [address] )     Hyperlipidemia     Hypertension     Obesity     Visual impairment      Past Surgical History:   Procedure Laterality Date    CARDIAC SURGERY      CATARACT EXTRACTION, BILATERAL Bilateral 2014    [place] CREATE SHUNT:VENTRIC-PERITONEAL Right 7/11/2018    Procedure: FRONTAL VENTRICULOPERITONEAL SHUNT INSERTION;  Surgeon: Marjan Boateng MD;  [facility];  Service: Neurosurgery    ROTATOR CUFF REPAIR Right 2016       Length Of Stay: 0  Performed at least 2 patient identifiers during session: Name and Birthday  PHYSICAL THERAPY EVALUATION :    02/07/21 0918   PT Last Visit   PT Visit Date 02/07/21   Note Type   Note type Evaluation   Pain Assessment   Pain Assessment Tool Pain Assessment not indicated - pt denies pain   Pain Score 1   Pain Location/Orientation Orientation: Right;Location: Shoulder  (@ rest)   Patient's Stated Pain Goal No pain   Home Living   Type of [address]   (initially pt reports one floor, later reports having 2 stair)   Home Equipment Stair glide;Quad cane  (stairglide to basement and to second floor)   Prior Function   Level of Andrew Independent with ADLs and functional mobility   Lives With Spouse  (pt primarily bed bound per pt)   Receives Help From Personal care attendant  (caregiver 5am for peroid of time for WIFE, not pt)   ADL Assistance Independent   Falls in the last 6 months 1 to 4  (Pt only reports one)   Comments Spouse reports that he assists pt at home PRN, but has caregivers to assist most of wife's needs   Restrictions/Precautions   Other Precautions Fall Risk;Pain; Impulsive;Cognitive   General   Additional Pertinent History Pt reports having NS follow up later this week  Pt does not recall whether he passed out or if he fell  R shoulder dislocation reduced in ED (not documented as anterior or posterior, no ortho consult ordered)   Family/Caregiver Present No   Cognition   Overall Cognitive Status Impaired   Arousal/Participation Alert   Following Commands Follows one step commands with increased time or repetition   RUE Strength   RUE Overall Strength Deficits  (R UE in sling  )   LUE Strength   LUE Overall Strength Within Functional Limits - able to perform ADL tasks with strength   Strength RLE   R Hip Flexion 4+/5   R Knee Extension 4+/5   R Ankle Dorsiflexion 4+/5   Strength LLE   L Hip Flexion 4+/5   L Knee Extension 4+/5   L Ankle Dorsiflexion 4+/5   Coordination   Movements are Fluid and Coordinated 0   Coordination and Movement Description dysmetric with heel<>shin, ataxic with transfers   Light Touch   RLE Light Touch Grossly intact   LLE Light Touch Grossly intact   Bed Mobility   Supine to Sit Unable to assess  (as pt in recliner, reports will need to perform R egress)   Additional items   (Pt denies any concerns with bed mobility)   Transfers   Sit to Stand 4  Minimal assistance   Additional items Assist x 1; Increased time required   Stand to Sit 5  Supervision   Additional items Assist x 1; Increased time required   Ambulation/Elevation   Gait pattern Ataxia; Excessively slow; Wide LUIS  (Pt holds cane in air for 75% of trial using LUE )   Additional items Assist x 1;Verbal cues   Assistive Device Small base quad cane  (unable to use quad cane in RUE w/UE in sling)   Distance 20'   Balance   Static Sitting Good   Static Standing Fair -   Ambulatory Fair -   Higher level balance   (TUG w/ cane 26 seconds)   Endurance Deficit   Endurance Deficit Yes   Endurance Deficit Description noted mild SOB post ambulation   Activity Tolerance   Medical Staff Made Aware residents and Dr Gm Bashir in room @ end of session   Nurse Made Aware spoke to RN before and after session   Assessment   Prognosis Fair   Problem List Decreased strength;Decreased endurance;Orthopedic restrictions;Pain;Obesity; Decreased cognition; Impaired judgement;Decreased safety awareness;Decreased mobility; Impaired balance;Decreased coordination   Barriers to Discharge Decreased caregiver support   Goals   Patient Goals to go home today (Pt reports son will pick him up)   STG Expiration Date 02/14/21   PT Treatment Day 1  (treatment documented in notes)   Plan   Treatment/Interventions Functional transfer training;LE strengthening/ROM; Endurance training;Cognitive reorientation;Equipment eval/education; Bed mobility;Gait training;Patient/family training; Therapeutic exercise;Spoke to nursing;Spoke to MD   PT Frequency 5x/wk  (after treatment session today)   Recommendation   PT Discharge Recommendation Home with skilled therapy  (and additional support--pt is declining rehab as well as any continued PT Services as he "does it on his own", however with pt's new dx of shoulder dislocation ,would recommend PT consult not only for f/u of ortho recommendations for shoulder but home assessment (pt reports possibly tripping on a rug?) and modification to his current exercise program as he had RUE limitations  Equipment Recommended Cane   AM-PAC Basic Mobility Inpatient   Turning in Bed Without Bedrails 3   Lying on Back to Sitting on Edge of Flat Bed 2   Moving Bed to Chair 3   Standing Up From Chair 3   Walk in Room 3   Climb 3-5 Stairs 2   Basic Mobility Inpatient Raw Score 16   Basic Mobility Standardized Score 38 32   (Please find full objective findings from PT assessment regarding body systems outlined above)  Assessment: Pt is a 81 y o  male seen for PT evaluation s/p admit to [facility] on 2/6/2021 w/ Rhabdomyolysis  Pt also had R shoulder reduced in ED, in sling  Order placed for PT    Upon evaluation: Pt requires min assistance for transfers and min assistance for ambulation with quad cane  Pt's clinical presentation is currently unstable/unpredictable given the functional mobility deficits above, especially (but not limited to) gait deviations, decreased functional mobility tolerance and orthopedic restrictions, coupled with fall risks including hx of falls, impaired balance, impaired coordination, impaired judgement, decreased safety awareness and increased TUG balance score, and combined with medical complications of abnormal WBCs, abnormal sodium values and impulsivity during admission  Pt to benefit from continued skilled PT tx while in hospital and upon DC to address deficits as defined above and maximize level of functional mobility  Recommend trial with cane next 1-2 sessions, ther ex next 1-2 sessions, OT consult and case management consult  Goals: Pt will: Perform bed mobility tasks with modified I to prepare for transfers and reposition in bed while following RUE sling guidelines  Perform transfers with modified I to improve ease of transfers and demonstrate compliance with WB limitations  Perform ambulation with LRAD for >50' with  Supervision  to increase Indep in home environment and decrease risk for falls  Improve TUG score to 22 seconds to demonstrate improvement in balance (MCID)    Comorbidities affecting pt's physical performance at time of assessment include: HTN, limited vision, CAD and R RTC tear, hydrocephalus and frontal  shunt  Personal factors affecting pt at time of IE include: unable to perform caregiver support/tasks, limited home support, advanced age, behavioral pattern, inability to perform IADLs, inability to navigate community distances, limited insight into impairments and recent fall(s)       Graham Jansen, PT, DPT  PHYSICAL THERAPY TREATMENT NOTE  Time In:935   Time Out:945  Total Time: 10 min  S:  Pt agreeable to additional training for mobility w/cane and transfers  O:  Transfers S x 2 trials  Amb w/ quad cane for 22' w/o uncorrected losses of balance with less gait deviations, but pt continues to "hold quad cane in air" and not use for sequencing  Pt also instructed in other recommendations upon DC including donning/doffing sling, using button down shirts while in sling, and potentially sleeping in recliner /lift chair if bed mobility with R egress is too challenging upon DC  A:  Pt requires less physical assistance to perform transfers and gait, but still requires some verbal instruction or tactile feedback to perform gait with proper form and technique  Pt resistant at times to suggestions, and reports his mobility limitations are due to his age  P:  Recommend addition of therapeutic exercises to current functional mobility program, continued training if not DCed       Claudia Dewey, PT, DPT

## 2021-02-07 NOTE — ED NOTES
Patient in recliner, more comfortable with shoulder  Call bell within reach        Miley Cuff, RN  02/07/21 5069

## 2021-02-07 NOTE — ASSESSMENT & PLAN NOTE
· Secondary to fall that occurred on 02/05    Reduced in ED    Plan:  · Pain control:  · Tylenol 650 mg q 6 hours p r n   · Oxycodone 2 5 mg q 6 p r n   · Encourage IS  · Sling to RUE

## 2021-02-07 NOTE — ASSESSMENT & PLAN NOTE
· Patient ambulates with cane at baseline  Now status post right shoulder reduction    History of neuropathy,  shunt  · PT consult

## 2021-02-07 NOTE — ASSESSMENT & PLAN NOTE
· Patient has history of PAD    Plan:  · Continue cilostazol and ASA  · Hold statin for now due to rhabdo

## 2021-02-07 NOTE — ASSESSMENT & PLAN NOTE
· Patient ambulates with cane at baseline  Now status post right shoulder reduction    History of neuropathy,  shunt    Plan  · PT consulted, recommends home with skilled therapy

## 2021-02-07 NOTE — ASSESSMENT & PLAN NOTE
· BP stable  · Home regimen:  Amlodipine, metoprolol, losartan; losartan was held on admission secondary to creatinine elevation  · Echo November 2020:  EF 60%  G1 DD  Moderate aortic stenosis      Plan:  · Continue home meds, resume losartan today

## 2021-02-07 NOTE — ASSESSMENT & PLAN NOTE
· Presentation:  Mechanical fall on 02/05 to right shoulder  No head strikes  On cilostazol  Assisted off floor at 5:00 p m  On 02/06      · Head CT:  Negative  · X-ray right shoulder:  Dislocation  · PT consult

## 2021-02-07 NOTE — ASSESSMENT & PLAN NOTE
· Sirs criteria:  Tachycardia, leukocytosis  Note, did miss AM dose of beta blocker on 02/06  · Afebrile without infectious complaints  · Check urinalysis  · EKG: obtain baseline    Appears sinus tach on tele   · Monitor off antibiotics  · Check procalcitonin  · IV Lopressor 2 5 mg x 1 dose now

## 2021-02-07 NOTE — ASSESSMENT & PLAN NOTE
· CK 12 19 on admission increased to 1293 today  · Status post 2 L NS fluid bolus in the emergency department  Continue with LR at 76 cc/hour    Plan:  · Hold statin for now   · Follow-up afternoon CK, and a m   CK

## 2021-02-07 NOTE — ASSESSMENT & PLAN NOTE
· BP stable  · Home regimen:  Amlodipine, metoprolol, losartan  · Will hold losartan for now pending repeat bmp  · Check for orthostasis  · Echo November 2020:  EF 60%  G1 DD  Moderate aortic stenosis

## 2021-02-07 NOTE — ASSESSMENT & PLAN NOTE
· CK 1200, secondary to fall  · Status post 2 L NS fluid bolus    Continue with LR at 75 cc/hour  · Will hold losartan for now pending repeat am labs, can likely resume in am  · Hold statin for now   · Recheck CK in AM

## 2021-02-07 NOTE — PROGRESS NOTES
Progress Note - Kayla Garcia 1935, 81 y o  male MRN: [id number]    Unit/Bed#: S -01 Encounter: [id number]    Primary Care Provider: Madeleine Sanchez MD   Date and time admitted to hospital: 2/6/2021  6:38 PM        * Rhabdomyolysis  Assessment & Plan  · CK 12 19 on admission increased to 1293 today  · Status post 2 L NS fluid bolus in the emergency department  Continue with LR at 76 cc/hour    Plan:  · Hold statin for now   · Follow-up afternoon CK, and a m  CK    S/P  shunt  Assessment & Plan  · Head CT:  Shunt in stable position    Plan:  · Patient follows with Neurosurgery as an outpatient    Shoulder dislocation, right, initial encounter  Assessment & Plan  · Secondary to fall that occurred on 02/05  Reduced in ED    Plan:  · Pain control:  · Tylenol 650 mg q 6 hours p r n   · Oxycodone 2 5 mg q 6 p r n   · Encourage IS  · Sling to [address]  · Presentation:  Mechanical fall on 02/05 to right shoulder  No head strikes  On cilostazol and ASA  Assisted off floor at 5:00 p m  On 02/06  · Head CT:  Negative  · X-ray right shoulder:  Dislocation    Plan:  · PT consult, recommends home with skilled therapy    Ambulatory dysfunction  Assessment & Plan  · Patient ambulates with cane at baseline  Now status post right shoulder reduction  History of neuropathy,  shunt    Plan  · PT consulted, recommends home with skilled therapy    Essential hypertension  Assessment & Plan  · BP stable  · Home regimen:  Amlodipine, metoprolol, losartan; losartan was held on admission secondary to creatinine elevation  · Echo November 2020:  EF 60%  G1 DD  Moderate aortic stenosis      Plan:  · Continue home meds, resume losartan today    Peripheral arterial disease ([facility] [address] )  Assessment & Plan  · Patient has history of PAD    Plan:  · Continue cilostazol and ASA  · Hold statin for now due to rhabdo           VTE Pharmacologic Prophylaxis:   Pharmacologic: Heparin  Mechanical VTE Prophylaxis in Place: Yes    Discussions with Specialists or Other Care Team Provider:  Discussed case with nursing team    Education and Discussions with Family / Patient:  Discussed case with patient at bedside and updated his son Will Copping over the phone    Current Length of Stay: 0 day(s)    Current Patient Status: Inpatient     Discharge Plan / Estimated Discharge Date:  2021    Code Status: Level 1 - Full Code      Subjective:   Patient was admitted to the hospital last night for shoulder dislocation and rhabdo  He feels well and is asking for discharge today, we discussed that he should wait 1 more day until his CK level came down  He worked with Physical therapy who recommended skilled therapy at home  He is feeling well and does not have any complaints at this time  Objective:     Vitals:   Temp (24hrs), Av 2 °F (36 8 °C), Min:97 9 °F (36 6 °C), Max:98 8 °F (37 1 °C)    Temp:  [97 9 °F (36 6 °C)-98 8 °F (37 1 °C)] 97 9 °F (36 6 °C)  HR:  [] 99  Resp:  [16-22] 16  BP: (132-180)/(63-96) 145/80  SpO2:  [92 %-97 %] 94 %  Body mass index is 33 36 kg/m²  Input and Output Summary (last 24 hours): Intake/Output Summary (Last 24 hours) at 2021 1516  Last data filed at 2021 1222  Gross per 24 hour   Intake --   Output 1250 ml   Net -1250 ml       Physical Exam:     Physical Exam  Vitals signs reviewed  Constitutional:       General: He is not in acute distress  Appearance: He is not ill-appearing, toxic-appearing or diaphoretic  HENT:      Head: Atraumatic  Comments:  shunt  Eyes:      General: No scleral icterus  Conjunctiva/sclera: Conjunctivae normal    Cardiovascular:      Rate and Rhythm: Normal rate and regular rhythm  Heart sounds: Normal heart sounds  No murmur  No gallop  Pulmonary:      Effort: Pulmonary effort is normal  No respiratory distress  Breath sounds: Normal breath sounds  No wheezing, rhonchi or rales     Abdominal:      General: Bowel sounds are normal  There is no distension  Palpations: Abdomen is soft  Tenderness: There is no abdominal tenderness  Musculoskeletal:         General: Swelling, tenderness, deformity and signs of injury present  Right lower leg: No edema  Left lower leg: No edema  Comments: Rich shoulder dislocation status post bedside reduction   Neurological:      General: No focal deficit present  Mental Status: He is alert and oriented to person, place, and time  Psychiatric:         Mood and Affect: Mood normal          Behavior: Behavior normal            Additional Data:     Labs:    Results from last 7 days   Lab Units 02/07/21  0434   WBC Thousand/uL 11 90*   HEMOGLOBIN g/dL 13 4   HEMATOCRIT % 40 8   PLATELETS Thousands/uL 274   NEUTROS PCT % 76*   LYMPHS PCT % 14   MONOS PCT % 10   EOS PCT % 0     Results from last 7 days   Lab Units 02/07/21  0434 02/06/21  1901   POTASSIUM mmol/L 3 9 3 9   CHLORIDE mmol/L 102 98*   CO2 mmol/L 24 21   BUN mg/dL 9 8   CREATININE mg/dL 0 95 1 25   CALCIUM mg/dL 8 3 8 8   ALK PHOS U/L  --  84   ALT U/L  --  38   AST U/L  --  41     Results from last 7 days   Lab Units 02/06/21  1901   INR  0 99       * I Have Reviewed All Lab Data Listed Above  * Additional Pertinent Lab Tests Reviewed:  [address] Admission Reviewed    Imaging:    Imaging Reports Reviewed Today Include:  Shoulder x-ray  Imaging Personally Reviewed by Myself Includes:  Shoulder x-ray    Recent Cultures (last 7 days):           Last 24 Hours Medication List:   Current Facility-Administered Medications   Medication Dose Route Frequency Provider Last Rate    acetaminophen  650 mg Oral Q6H PRN Shelley Abbott MD      amLODIPine  5 mg Oral Daily SAWYER Mayers      aspirin  325 mg Oral Daily Delroyzettmaria l Wilcox, [address]      cholecalciferol  2,000 Units Oral Daily Donzetta Brenden, [address]      cilostazol  100 mg Oral BID SAWYER Mayers      docusate sodium  100 mg Oral BID PRN SAWYER Mayers  fish oil  1,000 mg Oral BID Guyann Marc, CRNP      fluticasone  1 spray Each Nare Daily Guyann Marc, CRNP      heparin (porcine)  5,000 Units Subcutaneous [facility] Guyann Marc, [address]      lactated ringers  75 mL/hr Intravenous Continuous Guyann Marc, CRNP 75 mL/hr (02/07/21 1055)    loratadine  10 mg Oral Daily Guyann Marc, CRNP      losartan  50 mg Oral Daily Beto Dunn MD      melatonin  3 mg Oral HS PRN Guyann Marc, CRNP      metoprolol succinate  50 mg Oral Daily Guyann Marc, CRNP      multivitamin stress formula  1 tablet Oral Daily Guyann Marc, CRNP      oxyCODONE  2 5 mg Oral Q6H PRN Guyann Marc, CRNP      psyllium  1 packet Oral Daily Guyann Marc, CRNP          Today, Patient Was Seen By: Beto Dunn MD    ** Please Note: This note has been constructed using a voice recognition system   **

## 2021-02-07 NOTE — ASSESSMENT & PLAN NOTE
· Presentation:  Mechanical fall on 02/05 to right shoulder  No head strikes  On cilostazol and ASA  Assisted off floor at 5:00 p m  On 02/06      · Head CT:  Negative  · X-ray right shoulder:  Dislocation    Plan:  · PT consult, recommends home with skilled therapy

## 2021-02-07 NOTE — PLAN OF CARE
Problem: PHYSICAL THERAPY ADULT  Goal: Performs mobility at highest level of function for planned discharge setting  See evaluation for individualized goals  Description: Treatment/Interventions: Functional transfer training, LE strengthening/ROM, Endurance training, Cognitive reorientation, Equipment eval/education, Bed mobility, Gait training, Patient/family training, Therapeutic exercise, Spoke to nursing, Spoke to MD  Equipment Recommended: Tereza Guevara       See flowsheet documentation for full assessment, interventions and recommendations  Note: Prognosis: Fair  Problem List: Decreased strength, Decreased endurance, Orthopedic restrictions, Pain, Obesity, Decreased cognition, Impaired judgement, Decreased safety awareness, Decreased mobility, Impaired balance, Decreased coordination  Assessment: Pt is a 81 y o  male seen for PT evaluation s/p admit to [facility] on 2/6/2021 w/ Rhabdomyolysis  Pt also had R shoulder reduced in ED, in sling  Order placed for PT  Upon evaluation: Pt requires min assistance for transfers and min assistance for ambulation with quad cane  Pt's clinical presentation is currently unstable/unpredictable given the functional mobility deficits above, especially (but not limited to) gait deviations, decreased functional mobility tolerance and orthopedic restrictions, coupled with fall risks including hx of falls, impaired balance, impaired coordination, impaired judgement, decreased safety awareness and increased TUG balance score, and combined with medical complications of abnormal WBCs, abnormal sodium values and impulsivity during admission  Pt to benefit from continued skilled PT tx while in hospital and upon DC to address deficits as defined above and maximize level of functional mobility  Recommend trial with cane next 1-2 sessions, ther ex next 1-2 sessions, OT consult and case management consult     Barriers to Discharge: Decreased caregiver support     PT Discharge Recommendation: Home with skilled therapy(and additional support)          See flowsheet documentation for full assessment

## 2021-02-07 NOTE — UTILIZATION REVIEW
Initial Clinical Review    OBSERVATION 2/6 CHANGED TO  INPATIENT 2/7 @ 1451 DUE TO CONTINUE MONITORING AND TREATMENT OF RHABDOMYOLYSIS      Inpatient Admission Once     Question Answer Comment   Level of Care Med Surg    Estimated length of stay More than 2 Midnights    Certification I certify that inpatient services are medically necessary for this patient for a duration of greater than two midnights  See H&P and MD Progress Notes for additional information about the patient's course of treatment  02/07/21 1451         Admission: Date/Time/Statement:   Admission Orders (From admission, onward)     Ordered        02/06/21 2242  Place in Observation  Once                   Orders Placed This Encounter   Procedures    Place in Observation     Standing Status:   Standing     Number of Occurrences:   1     Order Specific Question:   Level of Care     Answer:   Med Surg [16]     ED Arrival Information     Expected Arrival Acuity Means of Arrival Escorted By Service Admission Type    - 2/6/2021 18:20 Emergent Walk-In Family Member Hospitalist Emergency    Arrival Complaint    fall shoulder injury (asprin)        Chief Complaint   Patient presents with    Fall     Pt presents to the ED post fall, last night  Pt reports walking when he tripped and fell to the side  c/o of right shoulder pain  Denies head injury, denies LOC, pt reports unable to get off the floor from last night until 1700 when son came to help him up  Assessment/Plan:  80 yo male presents to ED from home after mechanical fall 2/5 , unable to get up, slept on floor,  called family on 2/6 when he did not answer the door  Remained on floor until approximately 5 pm 2/6  C/o right shoulder pain, dislocation noted on xray, Reduced in ED    WBC elevated 19, CK 1200, pt is tachycardic, missed beta blocker 2/6 am  admitted as Observation to med surg for rhabdomyolysis continue IV fluids, hold losartan, statin recheck CK, in am, sling to GILDA, encourage IS, pain control, PT consult Check procalcitonin, UA, monitor off abx, check orthostatic bp's     2/7 CHANGED TO INPATIENT  CK slightly increased to 1293  Recheck CK 2/7 pm and 2/8 AM  Continue hydration  IV fluids, Monitor BMP, CBC 2/8 am  Continue pain control, PT Lungs clear, EKG NSR  O2 sat 94% RA     ED Triage Vitals   Temperature Pulse Respirations Blood Pressure SpO2   02/06/21 1837 02/06/21 1835 02/06/21 1835 02/06/21 1835 02/06/21 1835   98 8 °F (37 1 °C) (!) 127 20 154/81 97 %      Temp Source Heart Rate Source Patient Position - Orthostatic VS BP Location FiO2 (%)   02/06/21 1837 02/06/21 1835 02/06/21 1835 02/06/21 1835 --   Temporal Monitor Sitting Right arm       Pain Score       02/06/21 1835       Worst Possible Pain          Wt Readings from Last 1 Encounters:   02/06/21 96 6 kg (213 lb)     Additional Vital Signs:   Date/Time  Temp  Pulse  Resp  BP  MAP (mmHg)  SpO2  O2 Device  Patient Position - Orthostatic VS   02/07/21 0645  98 1 °F (36 7 °C)  78  18  135/78  --  92 %  None (Room air)  --   02/07/21 0328  --  100  18  166/83  --  92 %  None (Room air)  Standing    Patient Position - Orthostatic VS: denies dizziness at 02/07/21 0328   02/07/21 0325  98 3 °F (36 8 °C)  89  18  148/82  --  93 %  None (Room air)  Sitting    Patient Position - Orthostatic VS: patient in chair, unable to assess lying at 02/07/21 0325   02/06/21 2130  --  116Abnormal   18  157/74  106  93 %  None (Room air)  Lying   02/06/21 2106  --  112Abnormal   --  --  --  --  --  --   02/06/21 21:05:50  --  112Abnormal   20  133/63  --  92 %  --  --   02/06/21 21:05:08                       Pertinent Labs/Diagnostic Test Results:   2/6 Xray right shoulder - dislocation ED read   2/6 CT head  No acute intracranial abnormality        Stable position of a right frontal ventriculostomy shunt catheter with the tip at the body the right lateral ventricle    The size of the ventricular system has not significantly changed when compared to CT brain dated August 6, 2018      Moderate chronic small vessel ischemic changes          Results from last 7 days   Lab Units 02/07/21  0434 02/06/21  1901   WBC Thousand/uL 11 90* 19 04*   HEMOGLOBIN g/dL 13 4 15 5   HEMATOCRIT % 40 8 46 0   PLATELETS Thousands/uL 274 339   NEUTROS ABS Thousands/µL 8 94* 16 79*         Results from last 7 days   Lab Units 02/07/21  0434 02/06/21  1901   SODIUM mmol/L 135* 134*   POTASSIUM mmol/L 3 9 3 9   CHLORIDE mmol/L 102 98*   CO2 mmol/L 24 21   ANION GAP mmol/L 9 15*   BUN mg/dL 9 8   CREATININE mg/dL 0 95 1 25   EGFR ml/min/1 73sq m 73 52   CALCIUM mg/dL 8 3 8 8   MAGNESIUM mg/dL  --  1 8     Results from last 7 days   Lab Units 02/06/21  1901   AST U/L 41   ALT U/L 38   ALK PHOS U/L 84   TOTAL PROTEIN g/dL 8 2   ALBUMIN g/dL 4 0   TOTAL BILIRUBIN mg/dL 0 58         Results from last 7 days   Lab Units 02/07/21  0434 02/06/21  1901   GLUCOSE RANDOM mg/dL 116 158*             No results found for: BETA-HYDROXYBUTYRATE               Results from last 7 days   Lab Units 02/07/21  0434 02/06/21  1901   CK TOTAL U/L 1,293* 1,219*   CK MB INDEX % <1 0 <1 0   CK MB ng/mL 5 8* 5 5*     Results from last 7 days   Lab Units 02/06/21  1901   TROPONIN I ng/mL <0 02         Results from last 7 days   Lab Units 02/06/21  1901   PROTIME seconds 13 2   INR  0 99   PTT seconds 29                   ED Treatment:   Medication Administration from 02/06/2021 1820 to 02/07/2021 0759       Date/Time Order Dose Route Action Action by Comments     02/06/2021 2006 sodium chloride 0 9 % bolus 1,000 mL 0 mL Intravenous Stopped Rubia Banks RN      02/06/2021 1859 sodium chloride 0 9 % bolus 1,000 mL 1,000 mL Intravenous [address] Rubia Banks RN      02/06/2021 1859 fentanyl citrate (PF) 100 MCG/2ML 50 mcg 50 mcg Intravenous Given Rubia Banks RN      02/06/2021 2006 lidocaine (PF) (XYLOCAINE-MPF) 1 % injection 10 mL 10 mL Infiltration Given Rubia Banks RN given by provider 02/06/2021 1946 fentanyl citrate (PF) 100 MCG/2ML 50 mcg 50 mcg Intravenous Given Michelle Araiza RN      02/06/2021 2207 sodium chloride 0 9 % bolus 1,000 mL 0 mL Intravenous Stopped Michelle Araiza RN      02/06/2021 2037 sodium chloride 0 9 % bolus 1,000 mL 1,000 mL Intravenous New Bag Michelle Araiza RN      02/06/2021 2059 propofol (DIPRIVAN) 200 MG/20ML bolus injection 200 mg 200 mg Intravenous Given Michelle Araiza RN given by provider     02/06/2021 2037 ondansetron (ZOFRAN) injection 4 mg 4 mg Intravenous Given Michelle Araiza RN      02/07/2021 0116 metoprolol (LOPRESSOR) injection 2 5 mg 2 5 mg Intravenous Given Michelle Araiza RN      02/07/2021 0532 acetaminophen (TYLENOL) tablet 975 mg 975 mg Oral Given Mansoor Fraser RN not given not due     02/07/2021 0224 acetaminophen (TYLENOL) tablet 975 mg 975 mg Oral Given Michelle Araiza RN      02/07/2021 0121 lactated ringers infusion [facility] Michelle Araiza, [address] [place] [address]      02/07/2021 0900 heparin (porcine) subcutaneous injection 5,000 Units 5,000 Units Subcutaneous Given Michelle Araiza RN         Past Medical History:   Diagnosis Date    Allergic     Arthritis     Coronary artery disease     GERD (gastroesophageal reflux disease)     History of heart artery stent 2001    x2    History of transfusion 1988    Hydrocephalus ([facility] )     Hyperlipidemia     Hypertension     Obesity     Visual impairment      Present on Admission:   Essential hypertension   Peripheral arterial disease ([facility] )      Admitting Diagnosis: Shoulder injury [S49 [id number]]  Age/Sex: 81 y o  male  Admission Orders:  Scheduled Medications:  acetaminophen, 975 mg, Oral, Q8H [address]  amLODIPine, 5 mg, Oral, Daily  aspirin, 325 mg, Oral, Daily  cholecalciferol, 2,000 Units, Oral, Daily  cilostazol, 100 mg, Oral, BID  fish oil, 1,000 mg, Oral, BID  fluticasone, 1 spray, Each Nare, Daily  heparin (porcine), 5,000 Units, Subcutaneous, Q8H EVER  loratadine, 10 mg, Oral, Daily  metoprolol succinate, 50 mg, Oral, Daily  multivitamin stress formula, 1 tablet, Oral, Daily  psyllium, 1 packet, Oral, Daily      Continuous IV Infusions:  lactated ringers, 75 mL/hr, Intravenous, Continuous      PRN Meds:  docusate sodium, 100 mg, Oral, BID PRN  HYDROmorphone, 0 2 mg, Intravenous, Q6H PRN  melatonin, 3 mg, Oral, HS PRN  oxyCODONE, 2 5 mg, Oral, Q6H PRN     Up with assistance    Incentive spirometry   Daily weight   I/O   Neuro checks    Repeat CK 2/7 1435    Labs 2/8 am BMP, CBC, CK  None    Network Utilization Review Department  ATTENTION: Please call with any questions or concerns to [phone] and carefully listen to the prompts so that you are directed to the right person  All voicemails are confidential   [place] all requests for admission clinical reviews, approved or denied determinations and any other requests to dedicated fax number below belonging to the campus where the patient is receiving treatment   List of dedicated fax numbers for the Facilities:  [address] DENIALS (Administrative/Medical Necessity) [phone]   [address] (Maternity/NICU/Pediatrics) [phone]   [address] Monico Fang 5557 (Josias Gallardo "Amber" 103) [address] [place] [address] [address] [place] [address] [phone]

## 2021-02-08 VITALS
HEART RATE: 91 BPM | SYSTOLIC BLOOD PRESSURE: 169 MMHG | BODY MASS INDEX: 33.49 KG/M2 | RESPIRATION RATE: 16 BRPM | TEMPERATURE: 98.7 F | OXYGEN SATURATION: 95 % | DIASTOLIC BLOOD PRESSURE: 92 MMHG | WEIGHT: 213.85 LBS

## 2021-02-08 LAB
ANION GAP SERPL CALCULATED.3IONS-SCNC: 10 MMOL/L (ref 4–13)
BUN SERPL-MCNC: 10 MG/DL (ref 5–25)
CALCIUM SERPL-MCNC: 8.3 MG/DL (ref 8.3–10.1)
CHLORIDE SERPL-SCNC: 104 MMOL/L (ref 100–108)
CK MB SERPL-MCNC: 4 NG/ML (ref 0–5)
CK MB SERPL-MCNC: <1 % (ref 0–2.5)
CK SERPL-CCNC: 1298 U/L (ref 39–308)
CO2 SERPL-SCNC: 23 MMOL/L (ref 21–32)
CREAT SERPL-MCNC: 0.91 MG/DL (ref 0.6–1.3)
ERYTHROCYTE [DISTWIDTH] IN BLOOD BY AUTOMATED COUNT: 13.4 % (ref 11.6–15.1)
GFR SERPL CREATININE-BSD FRML MDRD: 77 ML/MIN/1.73SQ M
GLUCOSE SERPL-MCNC: 125 MG/DL (ref 65–140)
HCT VFR BLD AUTO: 41.8 % (ref 36.5–49.3)
HGB BLD-MCNC: 13.9 G/DL (ref 12–17)
MCH RBC QN AUTO: 31.2 PG (ref 26.8–34.3)
MCHC RBC AUTO-ENTMCNC: 33.3 G/DL (ref 31.4–37.4)
MCV RBC AUTO: 94 FL (ref 82–98)
PLATELET # BLD AUTO: 262 THOUSANDS/UL (ref 149–390)
PMV BLD AUTO: 9.7 FL (ref 8.9–12.7)
POTASSIUM SERPL-SCNC: 3.4 MMOL/L (ref 3.5–5.3)
PROCALCITONIN SERPL-MCNC: 0.06 NG/ML
RBC # BLD AUTO: 4.45 MILLION/UL (ref 3.88–5.62)
SODIUM SERPL-SCNC: 137 MMOL/L (ref 136–145)
WBC # BLD AUTO: 11.49 THOUSAND/UL (ref 4.31–10.16)

## 2021-02-08 PROCEDURE — 85027 COMPLETE CBC AUTOMATED: CPT | Performed by: INTERNAL MEDICINE

## 2021-02-08 PROCEDURE — 82550 ASSAY OF CK (CPK): CPT | Performed by: INTERNAL MEDICINE

## 2021-02-08 PROCEDURE — 84145 PROCALCITONIN (PCT): CPT | Performed by: NURSE PRACTITIONER

## 2021-02-08 PROCEDURE — 97116 GAIT TRAINING THERAPY: CPT

## 2021-02-08 PROCEDURE — 99239 HOSP IP/OBS DSCHRG MGMT >30: CPT | Performed by: INTERNAL MEDICINE

## 2021-02-08 PROCEDURE — 97110 THERAPEUTIC EXERCISES: CPT

## 2021-02-08 PROCEDURE — 80048 BASIC METABOLIC PNL TOTAL CA: CPT | Performed by: INTERNAL MEDICINE

## 2021-02-08 PROCEDURE — 82553 CREATINE MB FRACTION: CPT | Performed by: INTERNAL MEDICINE

## 2021-02-08 RX ORDER — POTASSIUM CHLORIDE 20 MEQ/1
40 TABLET, EXTENDED RELEASE ORAL ONCE
Status: COMPLETED | OUTPATIENT
Start: 2021-02-08 | End: 2021-02-08

## 2021-02-08 RX ORDER — AMLODIPINE BESYLATE 10 MG/1
10 TABLET ORAL DAILY
Status: DISCONTINUED | OUTPATIENT
Start: 2021-02-08 | End: 2021-02-08 | Stop reason: HOSPADM

## 2021-02-08 RX ADMIN — LORATADINE 10 MG: 10 TABLET ORAL at 10:03

## 2021-02-08 RX ADMIN — Medication 2000 UNITS: at 10:02

## 2021-02-08 RX ADMIN — LOSARTAN POTASSIUM 50 MG: 50 TABLET, FILM COATED ORAL at 10:03

## 2021-02-08 RX ADMIN — CILOSTAZOL 100 MG: 100 TABLET ORAL at 10:03

## 2021-02-08 RX ADMIN — METOPROLOL SUCCINATE 50 MG: 50 TABLET, EXTENDED RELEASE ORAL at 10:04

## 2021-02-08 RX ADMIN — SODIUM CHLORIDE, SODIUM LACTATE, POTASSIUM CHLORIDE, AND CALCIUM CHLORIDE 75 ML/HR: .6; .31; .03; .02 INJECTION, SOLUTION INTRAVENOUS at 05:21

## 2021-02-08 RX ADMIN — PSYLLIUM HUSK 1 PACKET: 3.4 POWDER ORAL at 10:04

## 2021-02-08 RX ADMIN — B-COMPLEX W/ C & FOLIC ACID TAB 1 TABLET: TAB at 10:03

## 2021-02-08 RX ADMIN — OMEGA-3 FATTY ACIDS CAP 1000 MG 1000 MG: 1000 CAP at 10:02

## 2021-02-08 RX ADMIN — POTASSIUM CHLORIDE 40 MEQ: 1500 TABLET, EXTENDED RELEASE ORAL at 10:03

## 2021-02-08 RX ADMIN — HEPARIN SODIUM 5000 UNITS: 5000 INJECTION INTRAVENOUS; SUBCUTANEOUS at 10:04

## 2021-02-08 RX ADMIN — AMLODIPINE BESYLATE 10 MG: 10 TABLET ORAL at 10:14

## 2021-02-08 RX ADMIN — HEPARIN SODIUM 5000 UNITS: 5000 INJECTION INTRAVENOUS; SUBCUTANEOUS at 02:05

## 2021-02-08 RX ADMIN — OXYCODONE HYDROCHLORIDE 2.5 MG: 5 TABLET ORAL at 00:54

## 2021-02-08 RX ADMIN — ASPIRIN 325 MG: 325 TABLET, COATED ORAL at 10:03

## 2021-02-08 NOTE — PLAN OF CARE
Problem: Potential for Falls  Goal: Patient will remain free of falls  Description: INTERVENTIONS:  - Assess patient frequently for physical needs  -  Identify cognitive and physical deficits and behaviors that affect risk of falls  -  [place] fall precautions as indicated by assessment   - Educate patient/family on patient safety including physical limitations  - Instruct patient to call for assistance with activity based on assessment  - Modify environment to reduce risk of injury  - Consider OT/PT consult to assist with strengthening/mobility  Outcome: Progressing     Problem: NEUROSENSORY - ADULT  Goal: Achieves stable or improved neurological status  Description: INTERVENTIONS  - Monitor and report changes in neurological status  - Monitor vital signs such as temperature, blood pressure, glucose, and any other labs ordered   - Initiate measures to prevent increased intracranial pressure  - Monitor for seizure activity and implement precautions if appropriate      Outcome: Progressing  Goal: Remains free of injury related to seizures activity  Description: INTERVENTIONS  - Maintain airway, patient safety  and administer oxygen as ordered  - Monitor patient for seizure activity, document and report duration and description of seizure to physician/advanced practitioner  - If seizure occurs,  ensure patient safety during seizure  - Reorient patient post seizure  - Seizure pads on all 4 side rails  - Instruct patient/family to notify RN of any seizure activity including if an aura is experienced  - Instruct patient/family to call for assistance with activity based on nursing assessment  - Administer anti-seizure medications if ordered    Outcome: Progressing  Goal: Achieves maximal functionality and self care  Description: INTERVENTIONS  - Monitor swallowing and airway patency with patient fatigue and changes in neurological status  - Encourage and assist patient to increase activity and self care     - Encourage visually impaired, hearing impaired and aphasic patients to use assistive/communication devices  Outcome: Progressing     Problem: CARDIOVASCULAR - ADULT  Goal: Maintains optimal cardiac output and hemodynamic stability  Description: INTERVENTIONS:  - Monitor I/O, vital signs and rhythm  - Monitor for S/S and trends of decreased cardiac output  - Administer and titrate ordered vasoactive medications to optimize hemodynamic stability  - Assess quality of pulses, skin color and temperature  - Assess for signs of decreased coronary artery perfusion  - Instruct patient to report change in severity of symptoms  Outcome: Progressing  Goal: Absence of cardiac dysrhythmias or at baseline rhythm  Description: INTERVENTIONS:  - Continuous cardiac monitoring, vital signs, obtain 12 lead EKG if ordered  - Administer antiarrhythmic and heart rate control medications as ordered  - Monitor electrolytes and administer replacement therapy as ordered  Outcome: Progressing

## 2021-02-08 NOTE — PHYSICAL THERAPY NOTE
PHYSICAL THERAPY NOTE  Patient Name: Gardenia Barba  IQHYT'O Date: 21 0830   PT Last Visit   PT Visit Date 21   Note Type   Note Type Treatment   Pain Assessment   Pain Assessment Tool 0-10   Pain Score 5   Pain Location/Orientation Orientation: Right;Location: Shoulder   Restrictions/Precautions   Other Precautions Fall Risk;Pain; Impulsive   General   Chart Reviewed Yes   Family/Caregiver Present No   Cognition   Orientation Level Oriented to person  (Pt identified by name and )   Following Commands Follows one step commands with increased time or repetition   Subjective   Subjective Pt suine in bed and agreeable to therapy session  Pt identified by name and   Bed Mobility   Rolling L 5  Supervision   Additional items Assist x 1;Bedrails; Increased time required  (Trunk management)   Supine to Sit 5  Supervision   Additional items Assist x 1;Bedrails; Increased time required;Verbal cues  (hand placement)   Transfers   Sit to Stand 5  Supervision   Additional items Assist x 1;Bedrails; Increased time required;Verbal cues  (hand placement)   Stand to Sit 5  Supervision   Additional items Assist x 1; Armrests; Increased time required;Verbal cues   Ambulation/Elevation   Gait pattern Ataxia; Excessively slow; Wide LUIS   Gait Assistance 5  Supervision   Additional items Assist x 1;Verbal cues; Tactile cues  (needing constant reminders for cane management, holds in air)   Assistive Device Small base quad cane   Distance 75'x2    Balance   Static Sitting Good   Dynamic Sitting Fair   Static Standing Fair   Dynamic Standing Fair   Ambulatory Fair   Activity Tolerance   Nurse Made Aware JERAD Carrasco, appropriate to treat  Exercises   Knee AROM Long Arc Quad Sitting;15 reps;AROM; Bilateral  (Pt needing tactile cuing to start)   Ankle Pumps Sitting;15 reps;AROM; Bilateral   Marching Sitting;10 reps;AROM; Bilateral Assessment   Prognosis Fair   Problem List Decreased strength;Decreased endurance;Orthopedic restrictions;Pain;Obesity; Decreased cognition; Impaired judgement;Decreased safety awareness;Decreased mobility; Impaired balance;Decreased coordination   Assessment Pt showing improvements for ambulation and bed mobility this session  Pt supervision this session only needing trunk management as he had just woken up  Pt needing cuing for hand placement for sit<>stand, supervision to stand  Pt amb 75'x2 with close supervision and NBQC, no LOB experienced during entire session  Pt able to perform exercises with visual demonstration to start  Pt would benefit from continued inpatient therapy to improve strength and amb  Pt is still wanting to go home and not to rehab, home with skilled therapy is still appropriate at this time  Barriers to Discharge Decreased caregiver support   Goals   Patient Goals to go home   STG Expiration Date 02/14/21   Short Term Goal #1 Pt will: Perform bed mobility tasks with modified I to prepare for transfers and reposition in bed while following RUE sling guidelines  Perform transfers with modified I to improve ease of transfers and demonstrate compliance with WB limitations  Perform ambulation with LRAD for >50' with  Supervision  to increase Indep in home environment and decrease risk for falls  Improve TUG score to 22 seconds to demonstrate improvement in balance (MCID)   PT Treatment Day 2   Plan   Treatment/Interventions Functional transfer training;LE strengthening/ROM; Endurance training;Cognitive reorientation;Equipment eval/education; Bed mobility;Gait training;Patient/family training; Therapeutic exercise;Spoke to nursing;Spoke to MD   PT Frequency 5x/wk   Recommendation   PT Discharge Recommendation Home with skilled therapy  (and additional support)   Equipment Recommended Cristine Khalil PTA

## 2021-02-08 NOTE — ASSESSMENT & PLAN NOTE
· Patient ambulates with cane at baseline  Now status post right shoulder reduction    History of neuropathy,  shunt

## 2021-02-08 NOTE — ASSESSMENT & PLAN NOTE
· CK decreased to 1298 today from 1712  · creatinine at baseline  Patient will have BMP in four days and follow up with his PCP in one week   · Advised to keep himself hydrated     · Hold Crestor for a week

## 2021-02-08 NOTE — ASSESSMENT & PLAN NOTE
· Presentation:  Mechanical fall on 02/05 to right shoulder  No head strikes  On cilostazol and ASA  Assisted off floor at 5:00 p m  On 02/06      · Head CT:  Negative  · X-ray right shoulder:  Dislocation, reduced in ED  · Tylenol for pain  · Follow with PCP

## 2021-02-08 NOTE — DISCHARGE INSTRUCTIONS
Rhabdomyolysis   WHAT YOU NEED TO KNOW:   Rhabdomyolysis is a condition where injured muscles release harmful substances into the bloodstream  These substances include potassium, phosphate, creatinine kinase, and myoglobin  Large amounts of these substances may damage your kidneys and other organs  DISCHARGE INSTRUCTIONS:   Call 911 if:   · You have chest pain  · Your heart is beating faster than usual or has a strange rhythm  Return to the emergency department if:   · Your urine is dark or tea-colored or has blood in it  · You have pain, swelling, or weakness in your arms or legs that does not go away or gets worse  · You are urinating less than usual or not able to urinate  Contact your healthcare provider if:   · You have questions or concerns about your condition or care  Follow up with your healthcare provider as directed: You may need to return to have blood tests done  Write down your questions so you remember to ask them during your visits  Self-care:   · Drink liquids as directed  Ask how much liquid to drink each day and which liquids are best for you  Drink more liquids if you are doing strenuous work, exercise, and if it is warm outside  Liquids help flush substances from your body  · Do not drink alcohol  Heavy alcohol use may increase your risk for rhabdomyolysis  © Copyright [address] Information is for End User's use only and may not be sold, redistributed or otherwise used for commercial purposes  All illustrations and images included in CareNotes® are the copyrighted property of [employer]  or Ellen Diez  The above information is an  only  It is not intended as medical advice for individual conditions or treatments  Talk to your doctor, nurse or pharmacist before following any medical regimen to see if it is safe and effective for you

## 2021-02-08 NOTE — DISCHARGE SUMMARY
Discharge- Derrel Nephew 1935, 81 y o  male MRN: [id number]    Unit/Bed#: S -01 Encounter: [id number]    Primary Care Provider: Yvette Landau, MD   Date and time admitted to hospital: 2/6/2021  6:38 PM        Shoulder dislocation, right, initial encounter  Assessment & Plan  · Secondary to fall that occurred on 02/05  Reduced in ED  · Sling to RUE  · Tylenol for pain       Fall  Assessment & Plan  · Presentation:  Mechanical fall on 02/05 to right shoulder  No head strikes  On cilostazol and ASA  Assisted off floor at 5:00 p m  On 02/06  · Head CT:  Negative  · X-ray right shoulder:  Dislocation, reduced in ED  · Tylenol for pain  · Follow with PCP      Ambulatory dysfunction  Assessment & Plan  · Patient ambulates with cane at baseline  Now status post right shoulder reduction  History of neuropathy,  shunt    S/P  shunt  Assessment & Plan  · Head CT:  Shunt in stable position  · Patient follows with Neurosurgery as an outpatient    Essential hypertension  Assessment & Plan  · Continue home medication  · Advised the patient to measure BP at home and follow up with his pcp, because of increased values during hospitalization  Peripheral arterial disease ([facility] [address] )  Assessment & Plan  · Patient has history of PAD  · Continue cilostazol and ASA      * Rhabdomyolysis  Assessment & Plan  · CK decreased to 1298 today from 1712  · creatinine at baseline  Patient will have BMP in four days and follow up with his PCP in one week   · Advised to keep himself hydrated     · Hold Crestor for a week         Discharging Resident Physician: Mike Varela MD  Attending: No att  providers found  PCP: Yvette Landau, MD  Admission Date: 2/6/2021  Discharge Date: 02/08/21    Disposition:     Home    Reason for Admission: rhabdomyolysis secondary to fall     Consultations During Hospital Stay:  ·     Procedures Performed:     · Right Shoulder reduction    Significant Findings / Test Results:     · r shoulder XR : Anterior subcoracoid dislocation        CK elevated: 1712 max   Incidental Findings:   · none    Test Results Pending at Discharge (will require follow up):   · none     Outpatient Tests Requested:  · bmp    Complications:  No complications     Hospital Course:     Niraj Glover is a 81 y o  male with past medical history significant for NPH status post  shunt, gait dysfunction-ambulates with cane, peripheral neuropathy, PAD, essential hypertension, moderate AS who presented following mechanical fall that occurred on 02/05  He complained of right shoulder pain prompting x-ray  Dislocation was noted  He underwent reduction in ED  CT head showed no acute intracranial abnormality, stable position of a right frontal ventriculostomy shunt catheter with the tip at the body the right lateral ventricle  CK was noted to be elevated, he was admitted for IV fluids  Creatinine was at baseline during the hospitalization, as well CK decreased   Patient received pain control medication, with good prognosis  Blood pressure was elevated during the admission, but patient mentioned his BP is usually low at home  He was discharged at home, will follow up with his PCP in one week and will monitor BMP  Recommended discontinuation of crestor for one week  Condition at Discharge: good     Discharge Day Visit / Exam:     Subjective:  Right shoulder sore   Vitals: Blood Pressure: 169/92 (02/08/21 0700)  Pulse: 91 (02/08/21 0700)  Temperature: 98 7 °F (37 1 °C) (02/08/21 0700)  Temp Source: Oral (02/08/21 0700)  Respirations: 16 (02/08/21 0700)  Weight - Scale: 97 kg (213 lb 13 5 oz) (02/08/21 0600)  SpO2: 95 % (02/08/21 0700)  Exam:   Physical Exam  Vitals signs reviewed  Constitutional:       General: He is not in acute distress  Appearance: He is obese  He is not toxic-appearing or diaphoretic  HENT:      Head: Normocephalic  Eyes:      General: No scleral icterus  Right eye: No discharge           Left eye: No discharge  Neck:      Musculoskeletal: Normal range of motion  Cardiovascular:      Rate and Rhythm: Normal rate and regular rhythm  Heart sounds: Normal heart sounds  No murmur  Pulmonary:      Effort: Pulmonary effort is normal  No respiratory distress  Breath sounds: Normal breath sounds  No wheezing, rhonchi or rales  Abdominal:      Palpations: Abdomen is soft  Tenderness: There is no abdominal tenderness  There is no guarding or rebound  Musculoskeletal:      Right lower leg: No edema  Left lower leg: No edema  Skin:     General: Skin is warm  Neurological:      Mental Status: He is alert and oriented to person, place, and time  Psychiatric:         Mood and Affect: Mood normal          Behavior: Behavior normal          Discussion with Family: discussed with patient's son    Discharge instructions/Information to patient and family:   See after visit summary for information provided to patient and family  Provisions for Follow-Up Care:  See after visit summary for information related to follow-up care and any pertinent home health orders  Planned Readmission: none      Discharge Medications:  See after visit summary for reconciled discharge medications provided to patient and family        ** Please Note: This note has been constructed using a voice recognition system **

## 2021-02-08 NOTE — DISCHARGE INSTR - AVS FIRST PAGE
Dear Joe Suggs,     It was our pleasure to care for you here at [facility]  It is our hope that we were always able to exceed the expected standards for your care during your stay  You were hospitalized due to rhabdomyolysis (muscule tissue breakdown that resulted in the release of myoglobin into the blood) secondary to fall  You were cared for on the floor by Key Gomez MD under the service of [id number] [facility] MD Samuel with the [facility] who covers for your primary care physician (PCP), Elvia Cuadra MD, while you were hospitalized  If you have any questions or concerns related to this hospitalization, you may contact us at [phone]  For follow up as well as any medication refills, we recommend that you follow up with your primary care physician  A registered nurse will reach out to you by phone within a few days after your discharge to answer any additional questions that you may have after going home  However, at this time we provide for you here, the most important instructions / recommendations at discharge:     · Notable Medication Adjustments -   · Please, do not take Crestor (rosuvastatin) for 1 week  · Take all your medication as before (except Crestor)  · Testing Required after Discharge -   · BMP in 4 days  · Important follow up information -   · Please follow-up with your PCP in 1 week  · Other Instructions -   · Please make sure you keep yourself hydrated  · Measure your blood pressure at home periodically, if it is too elevated call your PCP  You might need your blood pressure medication to be increase  · Please review this entire after visit summary as additional general instructions including medication list, appointments, activity, diet, any pertinent wound care, and other additional recommendations from your care team that may be provided for you        Sincerely,     Key Gomez MD

## 2021-02-08 NOTE — ASSESSMENT & PLAN NOTE
· Continue home medication  · Advised the patient to measure BP at home and follow up with his pcp, because of increased values during hospitalization

## 2021-02-10 ENCOUNTER — DOCUMENTATION (OUTPATIENT)
Dept: SOCIAL WORK | Facility: HOSPITAL | Age: 86
End: 2021-02-10

## 2021-02-10 NOTE — PROGRESS NOTES
Admission Report at [facility]  [facility] has admitted your patient to [facility] service with the following disciplines:      PT and OT  Respond to therapist through tiger text versus work VNA cell phone [phone]  Primary focus of home health care muculoskeletal  Patient stated goals of carebetter balance  get arm better  to be ambulatory  to get back to living  to be more independent  to use the phone and computer again  Anticipated visit pattern   starting week of 02 14 21 for home physical therapy 2wk2 and 1wk2     Request for medication clarification duplication of medication aspirin and cilostazol    Needs follow up physician appointments scheduled   given pt Mobile Lab phone number for pt to schedule blood work in the near future    Other pertinent information inc SOB during and following activity and mobility high HR at rest and post activity  Thank you for allowing us to participate in the care of your patient        Madiha Gage, PT

## 2021-02-11 DIAGNOSIS — I73.9 CLAUDICATION IN PERIPHERAL VASCULAR DISEASE (HCC): ICD-10-CM

## 2021-02-11 RX ORDER — CILOSTAZOL 100 MG/1
100 TABLET ORAL 2 TIMES DAILY
Qty: 180 TABLET | Refills: 0 | Status: SHIPPED | OUTPATIENT
Start: 2021-02-11 | End: 2021-04-15 | Stop reason: SDUPTHER

## 2021-02-11 NOTE — UTILIZATION REVIEW
Notification of Discharge  This is a Notification of Discharge from our facility [address]  Please be advised that this patient has been discharge from our facility  Below you will find the admission and discharge date and time including the patients disposition  PRESENTATION DATE: 2/6/2021  6:38 PM  OBS ADMISSION DATE:   IP ADMISSION DATE: 2/7/21 1451   DISCHARGE DATE: 2/8/2021  5:58 PM  DISPOSITION: Home with [facility] with [address] [facility]   Admission Orders listed below:  Admission Orders (From admission, onward)     Ordered        02/07/21 1451  Inpatient Admission  Once         02/06/21 2242  Place in Observation  Once                   Please contact the UR Department if additional information is required to close this patient's authorization/case  [address] [facility] Utilization Review Department  Main: [phone] x carefully listen to the prompts  All voicemails are confidential   [email or web address]  Send all requests for admission clinical reviews, approved or denied determinations and any other requests to dedicated fax number below belonging to the campus where the patient is receiving treatment   List of dedicated fax numbers:  [address] DENIALS (Administrative/Medical Necessity) [phone]   [address]  (Maternity/NICU/Pediatrics) [phone]   [place] [phone]     [address] [phone]   [facility] [phone]   [facility] [address] [facility] [phone]   [address] [facility] [phone]   [address] [facility] [address]  [address] [facility] [address] [facility] [phone]

## 2021-02-11 NOTE — TELEPHONE ENCOUNTER
Pt called requesting refill of pletal  Last time pt was seen was 4/12/18 by Dr Lois Rahman and per his OV note he wanted him to have a YOANA and f/u OV  Pt had YOANA at [facility] (scanned into chart) but never followed back up in the office  I advised him that this doppler is now almost 1 yrs old, pt reports he had one more recently in 2020 at [facility] before they changed to [address]  He states the doppler was ordered by Dr Luis Islas  Discussed w/ Adia Morin and she is going to have medical records check for a doppler report and she will make the pt an appointment  Pt is requesting a refill until that time  Informed him I would need to send this to our triage provider

## 2021-02-12 NOTE — TELEPHONE ENCOUNTER
----- Message from Chantell Gregorio RN sent at 2/4/2020  3:15 PM EST -----  Regarding: NPH-1 yr f/u (due in Feb 2021)  Please contact patient for 1 year f/u through NPH clinic due Feb 2021 [de-identified] : Physical Examination\par General: well nourished, in no acute distress, alert and oriented to person, place and time\par Psychiatric: normal mood and affect, no abnormal movements or speech patterns\par Eyes: vision intact with glasses\par Throat: no thyromegaly\par Lymph: no enlarged nodes, no lymphedema in extremity\par Respiratory: no wheezing, no shortness of breath with ambulation\par Cardiac: no cardiac leg swelling, 2+ peripheral pulses\par Neurology: normal gross sensation in extremities to light touch\par Abdomen: soft, non-tender, tympanic, no masses\par \par Musculoskeletal Examination\par Cervical spine		Full painless range of motion and negative Spurling's test\par \par Hand			Right			Left\par Appearance\par      Skin			normal			normal\par      Swelling/Deformity	minimal to no swelling about the thumb			normal\par  Range of Motion\par      Wrist Flexion		75			75\par      Wrist Extension	60			60\par      Finger Flexion  	0 cm palmar crease	0 cm palmar crease\par      Finger Extension	Full			Full\par      Supination		85			85\par      Pronation		90			90\par mild rt thumb stiffness in opposition\par Palpation\par      Snuff box		-			-\par      ScaphoLunate 	-			-\par      ECU/Ulna Styloid	-			-\par      Cubital Tunnel 	-			-\par      OTHER		-			-\par Strength Examination\par      Wrist Flexion		5+ / 0			5+ / 0\par      Wrist Extension	5+ / 0			5+ / 0\par      Finger Flexion  	5+ / 0			5+ / 0\par      Finger Extension	5+ / 0			5+ / 0\par      			-			-\par      Pincer		-			-\par Special Examination\par      Finklesteins		-			-\par      Carpal Tinnel		-			-\par      Carpal Compression	-			-\par      Phalens		-			-\par      Ulnar Canal Tinnel	-			-\par      Cubital Tunnel Tinnel	-			-\par      Merlos's		-			-\par stable UCL and RCL of the right thumb in comparison to the left\par Sensation\par      Axillary		normal			normal\par      LatAntCubBrach 	normal			normal\par      Median 		normal			normal\par      Ulnar 		normal			normal\par      Radial 		normal			normal\par Motor\par      AIN 			normal			normal\par      Ulnar 		normal			normal\par      Radial 		normal			normal\par      PIN 			normal			normal\par Pulses\par      Radial	 	2+			2+\par \par  [de-identified] : 3 views of the right hand\par 1-29-20\par Demonstrate:\par \par No fracture or dislocation [Fit For Work] : fit for work [Percentage Of Disability ( ___ % )] : currently ~he/she~ is at [unfilled]% disability

## 2021-02-15 ENCOUNTER — APPOINTMENT (OUTPATIENT)
Dept: LAB | Facility: HOSPITAL | Age: 86
End: 2021-02-15
Payer: COMMERCIAL

## 2021-02-15 DIAGNOSIS — T79.6XXA TRAUMATIC RHABDOMYOLYSIS, INITIAL ENCOUNTER (HCC): ICD-10-CM

## 2021-02-15 LAB
ANION GAP SERPL CALCULATED.3IONS-SCNC: 9 MMOL/L (ref 4–13)
BUN SERPL-MCNC: 12 MG/DL (ref 5–25)
CALCIUM SERPL-MCNC: 9.6 MG/DL (ref 8.3–10.1)
CHLORIDE SERPL-SCNC: 106 MMOL/L (ref 100–108)
CO2 SERPL-SCNC: 24 MMOL/L (ref 21–32)
CREAT SERPL-MCNC: 1.06 MG/DL (ref 0.6–1.3)
GFR SERPL CREATININE-BSD FRML MDRD: 64 ML/MIN/1.73SQ M
GLUCOSE SERPL-MCNC: 119 MG/DL (ref 65–140)
POTASSIUM SERPL-SCNC: 4.1 MMOL/L (ref 3.5–5.3)
SODIUM SERPL-SCNC: 139 MMOL/L (ref 136–145)

## 2021-02-15 PROCEDURE — 36415 COLL VENOUS BLD VENIPUNCTURE: CPT

## 2021-02-15 PROCEDURE — 80048 BASIC METABOLIC PNL TOTAL CA: CPT

## 2021-02-16 ENCOUNTER — OFFICE VISIT (OUTPATIENT)
Dept: NEUROSURGERY | Facility: CLINIC | Age: 86
End: 2021-02-16
Payer: COMMERCIAL

## 2021-02-16 VITALS
DIASTOLIC BLOOD PRESSURE: 72 MMHG | RESPIRATION RATE: 18 BRPM | SYSTOLIC BLOOD PRESSURE: 124 MMHG | BODY MASS INDEX: 33.43 KG/M2 | HEIGHT: 67 IN | WEIGHT: 213 LBS | TEMPERATURE: 97.9 F

## 2021-02-16 DIAGNOSIS — G91.2 NORMAL PRESSURE HYDROCEPHALUS (HCC): Primary | ICD-10-CM

## 2021-02-16 DIAGNOSIS — Z98.2 S/P VP SHUNT: ICD-10-CM

## 2021-02-16 PROCEDURE — 99215 OFFICE O/P EST HI 40 MIN: CPT | Performed by: NEUROLOGICAL SURGERY

## 2021-02-16 NOTE — PROGRESS NOTES
Office Note - Neurosurgery   Kayla Garcia 81 y o  male MRN: [id number]      Assessment:    Patient is stable  71-year-old gentleman post insertion of 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  He does notice some decline in his gait and balance since a recent fall, though he remains significantly improved compared to prior to surgery both objectively and subjectively  No signs or symptoms of shunt malfunction  Would not recommend any adjustment to his shunt  He will follow-up in 1 year's time  Through the integrated normal pressure hydrocephalus program   He will contact this office should any concerns arise in the interim  History, physical examination and diagnostic tests were reviewed and questions answered  Diagnosis, care plan and treatment options were discussed  The patient understand instructions and will follow up as directed  Plan:    Follow-up:   One year    Problem List Items Addressed This Visit        Nervous and Auditory    Normal pressure hydrocephalus (HCC) - Primary       Other    S/P  shunt          Subjective/Objective     Chief Complaint     none, 1 year follow-up for NPH  HPI      Pleasant 71-year-old gentleman who presents for yearly follow-up post insertion of 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  Unfortunately, he had a fall at home earlier in the month and was hospitalized for about a week  He had a dislocation of his right shoulder  He feels that since then his gait has been somewhat worse though overall continues to be significantly improved compared to prior to surgery  He denies any headaches, nausea vomiting or change in vision  No tenderness or swelling along the shunt tract  JOVITA HUSSEIN personally reviewed and updated  Review of Systems   Constitutional: Negative  HENT: Negative  Eyes: Negative  Respiratory: Positive for shortness of breath (with exertion)  Cardiovascular: Negative      Gastrointestinal: Negative  Endocrine: Negative  Genitourinary: Positive for urgency  Musculoskeletal: Positive for arthralgias and gait problem  Skin: Negative  Allergic/Immunologic: Positive for environmental allergies  Neurological: Positive for weakness (uses cane  Had fall  )  Hematological: Bruises/bleeds easily (aspirin)  Psychiatric/Behavioral: Positive for confusion (notices slight worsening of short-term memory)  Family History    Family History   Problem Relation Age of Onset    Hodgkin's lymphoma Father     Cancer Brother        Social History    Social History     Socioeconomic History    Marital status: /Civil Union     Spouse name: Not on file    Number of children: Not on file    Years of education: Not on file    Highest education level: Not on file   Occupational History    Not on file   Social Needs    Financial resource strain: Not on file    Food insecurity     Worry: Not on file     Inability: Not on file   Soma needs     Medical: Not on file     Non-medical: Not on file   Tobacco Use    Smoking status: Former Smoker     Packs/day: 6 00     Years: 20 00     Pack years: 120 00     Quit date: 1978     Years since quittin 8    Smokeless tobacco: Never Used   Substance and Sexual Activity    Alcohol use:  Yes     Alcohol/week: 2 0 standard drinks     Types: 2 Glasses of wine per week     Comment: 3-4 times per week    Drug use: No    Sexual activity: Never     Partners: Female   Lifestyle    Physical activity     Days per week: Not on file     Minutes per session: Not on file    Stress: Not on file   Relationships    Social connections     Talks on phone: Not on file     Gets together: Not on file     Attends Buddhist service: Not on file     Active member of club or organization: Not on file     Attends meetings of clubs or organizations: Not on file     Relationship status: Not on file    Intimate partner violence     Fear of current or ex partner: Not on file     Emotionally abused: Not on file     Physically abused: Not on file     Forced sexual activity: Not on file   Other Topics Concern    Not on file   Social History Narrative    Not on file       Past Medical History    Past Medical History:   Diagnosis Date    Allergic     Arthritis     Coronary artery disease     GERD (gastroesophageal reflux disease)     History of heart artery stent 2001    x2    History of transfusion 1988    Hydrocephalus ([address]     Hyperlipidemia     Hypertension     Obesity     Visual impairment        Surgical History    Past Surgical History:   Procedure Laterality Date    CARDIAC SURGERY      CATARACT EXTRACTION, BILATERAL Bilateral 2014    [place] CREATE SHUNT:VENTRIC-PERITONEAL Right 7/11/2018    Procedure: FRONTAL VENTRICULOPERITONEAL SHUNT INSERTION;  Surgeon: Tasia Lee MD;  [facility];  Service: Neurosurgery    ROTATOR CUFF REPAIR Right 2016       Medications      Current Outpatient Medications:     amLODIPine (NORVASC) 5 mg tablet, , Disp: , Rfl:     aspirin (ECOTRIN) 325 mg EC tablet, Take 325 mg by mouth every 6 (six) hours as needed for mild pain, Disp: , Rfl:     Cetirizine HCl (ZYRTEC ALLERGY) 10 MG CAPS, Take by mouth, Disp: , Rfl:     Cholecalciferol (VITAMIN D) 2000 units CAPS, Take by mouth, Disp: , Rfl:     cilostazol (PLETAL) 100 mg tablet, Take 1 tablet (100 mg total) by mouth 2 (two) times a day, Disp: 180 tablet, Rfl: 0    Fluticasone Propionate (FLONASE NA), into each nostril, Disp: , Rfl:     losartan (COZAAR) 50 mg tablet, , Disp: , Rfl:     metoprolol succinate (TOPROL XL) 50 mg 24 hr tablet, Take by mouth, Disp: , Rfl:     Multiple Vitamin (MULTIVITAMIN) capsule, Take 1 capsule by mouth daily, Disp: , Rfl:     Omega-3 1000 MG CAPS, Take by mouth, Disp: , Rfl:     rosuvastatin (CRESTOR) 10 MG tablet, Take by mouth, Disp: , Rfl:     Allergies    No Known Allergies    The following portions of the patient's history were reviewed and updated as appropriate: allergies, current medications, past family history, past medical history, past social history, past surgical history and problem list     Investigations    I personally reviewed the CT, NPH PT and NPH Cognitive results with the patient:      CT scan of the head without contrast dated February 6th, 2021  Comparison to previous imaging  Stable appearance of right frontal ventricular catheter  No new intra-axial or extra-axial lesions   imaging demonstrates no obvious disconnection within the shunt tubing over the cervical spine and cranial component  NPH scale dated 2/16/21, 11/15  PT gait assessment dated 2/15/21  TUG 16 sec , TUGc 18 sec, DGI 20/24   MoCA dated 2/16/21, 28/30  Physical Exam    Vitals:  Blood pressure 124/72, temperature 97 9 °F (36 6 °C), resp  rate 18, height 5' 7" (1 702 m), weight 96 6 kg (213 lb)  ,Body mass index is 33 36 kg/m²  Physical Exam  Vitals signs reviewed  Constitutional:       General: He is not in acute distress  HENT:      Head: Atraumatic  Eyes:      Extraocular Movements: Extraocular movements intact  Pulmonary:      Effort: Pulmonary effort is normal  No respiratory distress  Musculoskeletal:         General: No deformity  Skin:     General: Skin is warm and dry  Comments: Shunt depresses and refills easily  Neurological:      Mental Status: He is alert and oriented to person, place, and time  Comments: Walks with a steady but somewhat shuffling gait  Turn in one-step  Psychiatric:         Mood and Affect: Mood normal          Behavior: Behavior normal        Neurologic Exam     Mental Status   Oriented to person, place, and time

## 2021-02-22 ENCOUNTER — IMMUNIZATIONS (OUTPATIENT)
Dept: FAMILY MEDICINE CLINIC | Facility: HOSPITAL | Age: 86
End: 2021-02-22

## 2021-02-22 DIAGNOSIS — Z23 ENCOUNTER FOR IMMUNIZATION: Primary | ICD-10-CM

## 2021-02-22 PROCEDURE — 0012A SARS-COV-2 / COVID-19 MRNA VACCINE (MODERNA) 100 MCG: CPT

## 2021-02-22 PROCEDURE — 91301 SARS-COV-2 / COVID-19 MRNA VACCINE (MODERNA) 100 MCG: CPT

## 2021-03-02 ENCOUNTER — OFFICE VISIT (OUTPATIENT)
Dept: OBGYN CLINIC | Facility: CLINIC | Age: 86
End: 2021-03-02
Payer: COMMERCIAL

## 2021-03-02 VITALS
HEART RATE: 89 BPM | HEIGHT: 67 IN | BODY MASS INDEX: 33.27 KG/M2 | WEIGHT: 212 LBS | SYSTOLIC BLOOD PRESSURE: 119 MMHG | DIASTOLIC BLOOD PRESSURE: 69 MMHG

## 2021-03-02 DIAGNOSIS — S43.004A SHOULDER DISLOCATION, RIGHT, INITIAL ENCOUNTER: Primary | ICD-10-CM

## 2021-03-02 DIAGNOSIS — S46.011A STRAIN OF MUSCLE(S) AND TENDON(S) OF THE ROTATOR CUFF OF RIGHT SHOULDER, INITIAL ENCOUNTER: ICD-10-CM

## 2021-03-02 PROCEDURE — 1036F TOBACCO NON-USER: CPT | Performed by: ORTHOPAEDIC SURGERY

## 2021-03-02 PROCEDURE — 1160F RVW MEDS BY RX/DR IN RCRD: CPT | Performed by: ORTHOPAEDIC SURGERY

## 2021-03-02 PROCEDURE — 99203 OFFICE O/P NEW LOW 30 MIN: CPT | Performed by: ORTHOPAEDIC SURGERY

## 2021-03-02 NOTE — PROGRESS NOTES
Patient Name:  Hanna Davis  MRN:  [id number]    Assessment & Plan     Right shoulder dislocation on 2/6/2021, suspect rotator cuff strain  1  Continue physical therapy and transition to HEP at therapist's discretion  2  Resume normal activity as tolerated  3  Follow up in 4-6 weeks  Chief Complaint     Right Shoulder Pain    History of the Present Illness     Hanna Davis is a 81 y o  male presents today for orthopedic consultation requested by Dr Nyasia Meyers for right shoulder pain  Patient dislocated his right shoulder after a fall at home on 2/6/2021  He underwent closed reduction in the ER  He reports ongoing discomfort over the anterolateral aspect of the shoulder that is exacerbated by overhead motions and lifting  The symptoms have been gradually improving with physical therapy and activity modification  No subjective instability  Past surgical history is notable for rotator cuff repair years ago after which he was asymptomatic  Physical Exam     /69   Pulse 89   Ht 5' 7" (1 702 m)   Wt 96 2 kg (212 lb)   BMI 33 20 kg/m²     Right shoulder:  Tenderness:  None  Range of motion:   FF:  140   ER-abduction: 70   IR-abduction: 20  Strength:  FF 5/5  Impingement signs:  Positive  Empty can test:  Positive  Speed's test:  Positive  Cross-body adduction test:  Positive    Eyes:  Anicteric sclerae  ENT:  Trachea midline  Lungs:  Normal respiratory effort  Cardiovascular:  Capillary refill is less than 2 seconds  Lymphatic:  No palpable lymphadenopathy  Skin:  Intact without erythema  Neurologic:  Sensation grossly intact to light touch  Psychiatric:  Mood and affect are appropriate  Data Review     I have personally reviewed pertinent films in PACS, and my interpretation follows:    XR right shoulder 2/6/2021: Anterior glenohumeral joint dislocation with Hill-Sachs deformity  Successful closed reduction on post-reduction films        Past Medical History:   Diagnosis Date    Allergic     Arthritis     Coronary artery disease     GERD (gastroesophageal reflux disease)     History of heart artery stent 2001    x2    History of transfusion 1988    Hydrocephalus ([address] )     Hyperlipidemia     Hypertension     Obesity     Visual impairment        Past Surgical History:   Procedure Laterality Date    CARDIAC SURGERY      CATARACT EXTRACTION, BILATERAL Bilateral     CT CREATE SHUNT:VENTRIC-PERITONEAL Right 2018    Procedure: FRONTAL VENTRICULOPERITONEAL SHUNT INSERTION;  Surgeon: Tuyet Pineda MD;  [facility];  Service: Neurosurgery    ROTATOR CUFF REPAIR Right 2016       No Known Allergies    Current Outpatient Medications on File Prior to Visit   Medication Sig Dispense Refill    amLODIPine (NORVASC) 5 mg tablet       aspirin (ECOTRIN) 325 mg EC tablet Take 325 mg by mouth every 6 (six) hours as needed for mild pain      Cetirizine HCl (ZYRTEC ALLERGY) 10 MG CAPS Take by mouth      Cholecalciferol (VITAMIN D) 2000 units CAPS Take by mouth      cilostazol (PLETAL) 100 mg tablet Take 1 tablet (100 mg total) by mouth 2 (two) times a day 180 tablet 0    Fluticasone Propionate (FLONASE NA) into each nostril      losartan (COZAAR) 50 mg tablet       metoprolol succinate (TOPROL XL) 50 mg 24 hr tablet Take by mouth      Multiple Vitamin (MULTIVITAMIN) capsule Take 1 capsule by mouth daily      Omega-3 1000 MG CAPS Take by mouth      rosuvastatin (CRESTOR) 10 MG tablet Take by mouth       No current facility-administered medications on file prior to visit  Social History     Tobacco Use    Smoking status: Former Smoker     Packs/day: 6 00     Years: 20 00     Pack years: 120 00     Quit date: 1978     Years since quittin 8    Smokeless tobacco: Never Used   Substance Use Topics    Alcohol use:  Yes     Alcohol/week: 2 0 standard drinks     Types: 2 Glasses of wine per week     Comment: 3-4 times per week    Drug use: No       Family History   Problem Relation Age of Onset    Hodgkin's lymphoma Father     Cancer Brother        Review of Systems     As stated in the HPI  All other systems were reviewed and are negative      Scribe Attestation    I,:  Jenifer Shipman am acting as a scribe while in the presence of the attending physician :       I,:  Rosalina Camarena MD personally performed the services described in this documentation    as scribed in my presence :

## 2021-03-11 ENCOUNTER — TRANSCRIBE ORDERS (OUTPATIENT)
Dept: ADMINISTRATIVE | Facility: HOSPITAL | Age: 86
End: 2021-03-11

## 2021-03-11 ENCOUNTER — HOSPITAL ENCOUNTER (OUTPATIENT)
Dept: RADIOLOGY | Facility: HOSPITAL | Age: 86
Discharge: HOME | End: 2021-03-11
Payer: COMMERCIAL

## 2021-03-11 ENCOUNTER — TELEMEDICINE (OUTPATIENT)
Dept: VASCULAR SURGERY | Facility: CLINIC | Age: 86
End: 2021-03-11
Payer: COMMERCIAL

## 2021-03-11 ENCOUNTER — LAB (OUTPATIENT)
Dept: LAB | Facility: HOSPITAL | Age: 86
End: 2021-03-11
Payer: COMMERCIAL

## 2021-03-11 VITALS
SYSTOLIC BLOOD PRESSURE: 162 MMHG | WEIGHT: 212 LBS | HEART RATE: 83 BPM | HEIGHT: 67 IN | DIASTOLIC BLOOD PRESSURE: 88 MMHG | BODY MASS INDEX: 33.27 KG/M2

## 2021-03-11 DIAGNOSIS — E78.5 HYPERLIPIDEMIA, UNSPECIFIED HYPERLIPIDEMIA TYPE: Primary | ICD-10-CM

## 2021-03-11 DIAGNOSIS — I73.9 PERIPHERAL ARTERIAL DISEASE (HCC): Primary | ICD-10-CM

## 2021-03-11 DIAGNOSIS — I10 ESSENTIAL HYPERTENSION, MALIGNANT: ICD-10-CM

## 2021-03-11 DIAGNOSIS — M47.896 OTHER OSTEOARTHRITIS OF SPINE, LUMBAR REGION: ICD-10-CM

## 2021-03-11 DIAGNOSIS — I25.10 DISEASE OF CARDIOVASCULAR SYSTEM: ICD-10-CM

## 2021-03-11 LAB
ALBUMIN SERPL BCP-MCNC: 3.8 G/DL (ref 3.5–5)
ALP SERPL-CCNC: 82 U/L (ref 46–116)
ALT SERPL W P-5'-P-CCNC: 33 U/L (ref 12–78)
ANION GAP SERPL CALCULATED.3IONS-SCNC: 10 MMOL/L (ref 4–13)
AST SERPL W P-5'-P-CCNC: 21 U/L (ref 5–45)
BASOPHILS # BLD AUTO: 0.09 THOUSANDS/ΜL (ref 0–0.1)
BASOPHILS NFR BLD AUTO: 1 % (ref 0–1)
BILIRUB SERPL-MCNC: 0.4 MG/DL (ref 0.2–1)
BUN SERPL-MCNC: 11 MG/DL (ref 5–25)
CALCIUM SERPL-MCNC: 8.7 MG/DL (ref 8.3–10.1)
CHLORIDE SERPL-SCNC: 102 MMOL/L (ref 100–108)
CHOLEST SERPL-MCNC: 116 MG/DL (ref 50–200)
CO2 SERPL-SCNC: 25 MMOL/L (ref 21–32)
CREAT SERPL-MCNC: 1.09 MG/DL (ref 0.6–1.3)
EOSINOPHIL # BLD AUTO: 0.49 THOUSAND/ΜL (ref 0–0.61)
EOSINOPHIL NFR BLD AUTO: 6 % (ref 0–6)
ERYTHROCYTE [DISTWIDTH] IN BLOOD BY AUTOMATED COUNT: 13.2 % (ref 11.6–15.1)
GFR SERPL CREATININE-BSD FRML MDRD: 62 ML/MIN/1.73SQ M
GLUCOSE P FAST SERPL-MCNC: 121 MG/DL (ref 65–99)
HCT VFR BLD AUTO: 43.2 % (ref 36.5–49.3)
HDLC SERPL-MCNC: 59 MG/DL
HGB BLD-MCNC: 14.1 G/DL (ref 12–17)
IMM GRANULOCYTES # BLD AUTO: 0.03 THOUSAND/UL (ref 0–0.2)
IMM GRANULOCYTES NFR BLD AUTO: 0 % (ref 0–2)
LDLC SERPL CALC-MCNC: 33 MG/DL (ref 0–100)
LYMPHOCYTES # BLD AUTO: 2.65 THOUSANDS/ΜL (ref 0.6–4.47)
LYMPHOCYTES NFR BLD AUTO: 30 % (ref 14–44)
MCH RBC QN AUTO: 30.9 PG (ref 26.8–34.3)
MCHC RBC AUTO-ENTMCNC: 32.6 G/DL (ref 31.4–37.4)
MCV RBC AUTO: 95 FL (ref 82–98)
MONOCYTES # BLD AUTO: 0.88 THOUSAND/ΜL (ref 0.17–1.22)
MONOCYTES NFR BLD AUTO: 10 % (ref 4–12)
NEUTROPHILS # BLD AUTO: 4.82 THOUSANDS/ΜL (ref 1.85–7.62)
NEUTS SEG NFR BLD AUTO: 53 % (ref 43–75)
NONHDLC SERPL-MCNC: 57 MG/DL
NRBC BLD AUTO-RTO: 0 /100 WBCS
PLATELET # BLD AUTO: 331 THOUSANDS/UL (ref 149–390)
PMV BLD AUTO: 9.5 FL (ref 8.9–12.7)
POTASSIUM SERPL-SCNC: 3.9 MMOL/L (ref 3.5–5.3)
PROT SERPL-MCNC: 7.7 G/DL (ref 6.4–8.2)
RBC # BLD AUTO: 4.57 MILLION/UL (ref 3.88–5.62)
SODIUM SERPL-SCNC: 137 MMOL/L (ref 136–145)
TRIGL SERPL-MCNC: 119 MG/DL
WBC # BLD AUTO: 8.96 THOUSAND/UL (ref 4.31–10.16)

## 2021-03-11 PROCEDURE — 72110 X-RAY EXAM L-2 SPINE 4/>VWS: CPT

## 2021-03-11 PROCEDURE — 80061 LIPID PANEL: CPT | Performed by: INTERNAL MEDICINE

## 2021-03-11 PROCEDURE — 99442 PR PHYS/QHP TELEPHONE EVALUATION 11-20 MIN: CPT | Performed by: SURGERY

## 2021-03-11 PROCEDURE — 85025 COMPLETE CBC W/AUTO DIFF WBC: CPT | Performed by: INTERNAL MEDICINE

## 2021-03-11 PROCEDURE — 36415 COLL VENOUS BLD VENIPUNCTURE: CPT | Performed by: INTERNAL MEDICINE

## 2021-03-11 PROCEDURE — 80053 COMPREHEN METABOLIC PANEL: CPT | Performed by: INTERNAL MEDICINE

## 2021-03-11 NOTE — ASSESSMENT & PLAN NOTE
Doing fairly well from a peripheral arterial standpoint  He is not quite as active recently because of a shoulder dislocation but there is no tissue loss or ischemic rest pain at this time  He is to remain on cilostazol and baby aspirin indefinitely as it does seem to have helped his mobility  Plan:  Follow-up lower extremity arterial Doppler at [facility] in approximately 3 months

## 2021-03-11 NOTE — LETTER
March 11, 2021     Go Lopez MD  [address] [facility] NEUROREHAB [facility] [address]    Patient: Gertrude Callahan   YOB: 1935   Date of Visit: 3/11/2021       Dear Dr Cuong Toussaint: Thank you for referring Murphy Pollack to me for evaluation  Below are my notes for this consultation  If you have questions, please do not hesitate to call me  I look forward to following your patient along with you           Sincerely,        Delfina Mckinnon MD        CC: No Recipients

## 2021-03-11 NOTE — PROGRESS NOTES
Virtual Brief Visit    Assessment/Plan:     Doing fairly well from a peripheral arterial standpoint  He is not quite as active recently because of a shoulder dislocation but there is no tissue loss or ischemic rest pain at this time  He is to remain on cilostazol and baby aspirin indefinitely as it does seem to have helped his mobility  Plan:  Follow-up lower extremity arterial Doppler at [facility] in approximately 3 months  Problem List Items Addressed This Visit     None                Reason for visit is   Chief Complaint   Patient presents with   [address] Virtual Brief Visit        Encounter provider Poli Piña MD    Provider located at [facility] [address]  [facility] [address]    Recent Visits  No visits were found meeting these conditions  Showing recent visits within past 7 days and meeting all other requirements     Today's Visits  Date Type Provider Dept   03/11/21 Telemedicine Poli Piña MD [address] [facility] [place] [facility] today's visits and meeting all other requirements     Future Appointments  No visits were found meeting these conditions  Showing future appointments within next 150 days and meeting all other requirements        After connecting through telephone, the patient was identified by name and date of birth  Gardenia Barba was informed that this is a telemedicine visit and that the visit is being conducted through telephone  My office door was closed  No one else was in the room  He acknowledged consent and understanding of privacy and security of the platform  The patient has agreed to participate and understands he can discontinue the visit at any time  Patient is aware this is a billable service  Subjective    Gardenia Barba is a 81 y o  male who is doing fairly well from a peripheral arterial standpoint    He is not quite as active recently because of a shoulder dislocation but there is no tissue loss or ischemic rest pain at this time  He is to remain on cilostazol and baby aspirin indefinitely as it does seem to have helped his mobility  Plan:  Follow-up lower extremity arterial Doppler at [facility] in approximately 3 months  HPI     Past Medical History:   Diagnosis Date    Allergic     Arthritis     Coronary artery disease     GERD (gastroesophageal reflux disease)     History of heart artery stent 2001    x2    History of transfusion 1988    Hydrocephalus ([address]     Hyperlipidemia     Hypertension     Obesity     Visual impairment        Past Surgical History:   Procedure Laterality Date    CARDIAC SURGERY      CATARACT EXTRACTION, BILATERAL Bilateral 2014    [place] CREATE SHUNT:VENTRIC-PERITONEAL Right 7/11/2018    Procedure: FRONTAL VENTRICULOPERITONEAL SHUNT INSERTION;  Surgeon: Gayle Bejarano MD;  [facility];  Service: Neurosurgery    ROTATOR CUFF REPAIR Right 2016       Current Outpatient Medications   Medication Sig Dispense Refill    amLODIPine (NORVASC) 5 mg tablet       aspirin (ECOTRIN) 325 mg EC tablet Take 325 mg by mouth every 6 (six) hours as needed for mild pain      Cetirizine HCl (ZYRTEC ALLERGY) 10 MG CAPS Take by mouth      Cholecalciferol (VITAMIN D) 2000 units CAPS Take by mouth      cilostazol (PLETAL) 100 mg tablet Take 1 tablet (100 mg total) by mouth 2 (two) times a day 180 tablet 0    losartan (COZAAR) 50 mg tablet       metoprolol succinate (TOPROL XL) 50 mg 24 hr tablet Take by mouth      Multiple Vitamin (MULTIVITAMIN) capsule Take 1 capsule by mouth daily      Omega-3 1000 MG CAPS Take by mouth      rosuvastatin (CRESTOR) 10 MG tablet Take by mouth      Fluticasone Propionate (FLONASE NA) into each nostril       No current facility-administered medications for this visit           No Known Allergies    Review of Systems  Peripheral arterial claudication, no GI or  symptoms no constitutional symptoms weight loss malaise or fever, all other systems negative    Vitals:    03/11/21 1433   BP: 162/88   Pulse: 83   Weight: 96 2 kg (212 lb)   Height: 5' 7" (1 702 m)         I spent Fifteen minutes directly with the patient during this visit    VIRTUAL VISIT [facility] acknowledges that he has consented to an online visit or consultation  He understands that the online visit is based solely on information provided by him, and that, in the absence of a face-to-face physical evaluation by the physician, the diagnosis he receives is both limited and provisional in terms of accuracy and completeness  This is not intended to replace a full medical face-to-face evaluation by the physician  Romi Brown understands and accepts these terms

## 2021-03-11 NOTE — PATIENT INSTRUCTIONS
Doing fairly well from a peripheral arterial standpoint  He is not quite as active recently because of a shoulder dislocation but there is no tissue loss or ischemic rest pain at this time  He is to remain on cilostazol and baby aspirin indefinitely as it does seem to have helped his mobility  Plan:  Follow-up lower extremity arterial Doppler at [facility] in approximately 3 months

## 2021-04-13 ENCOUNTER — OFFICE VISIT (OUTPATIENT)
Dept: OBGYN CLINIC | Facility: CLINIC | Age: 86
End: 2021-04-13
Payer: COMMERCIAL

## 2021-04-13 VITALS
DIASTOLIC BLOOD PRESSURE: 82 MMHG | SYSTOLIC BLOOD PRESSURE: 149 MMHG | HEART RATE: 84 BPM | HEIGHT: 67 IN | WEIGHT: 207 LBS | BODY MASS INDEX: 32.49 KG/M2

## 2021-04-13 DIAGNOSIS — M75.101 TEAR OF RIGHT ROTATOR CUFF, UNSPECIFIED TEAR EXTENT, UNSPECIFIED WHETHER TRAUMATIC: Chronic | ICD-10-CM

## 2021-04-13 DIAGNOSIS — S43.014D ANTERIOR DISLOCATION OF RIGHT SHOULDER, SUBSEQUENT ENCOUNTER: Primary | ICD-10-CM

## 2021-04-13 PROBLEM — S43.014A ANTERIOR DISLOCATION OF RIGHT SHOULDER: Status: ACTIVE | Noted: 2021-02-06

## 2021-04-13 PROCEDURE — 1160F RVW MEDS BY RX/DR IN RCRD: CPT | Performed by: ORTHOPAEDIC SURGERY

## 2021-04-13 PROCEDURE — 99213 OFFICE O/P EST LOW 20 MIN: CPT | Performed by: ORTHOPAEDIC SURGERY

## 2021-04-13 PROCEDURE — 1036F TOBACCO NON-USER: CPT | Performed by: ORTHOPAEDIC SURGERY

## 2021-04-13 NOTE — PROGRESS NOTES
Patient Name:  Miri Dejesus  MRN:  [id number]    Assessment & Plan     Right shoulder dislocation 2021, improving  1  Continue home exercises  2  Gradually resume normal activity as tolerated  3  Follow-up in 6 weeks if pain persists  History of the Present Illness     Patient returns today for his right shoulder  He reports gradual improvement  He has some residual mild pain localizing to the lateral aspect and radiating distally to his elbow times  Symptoms are exacerbated by reaching out  No subjective instability  He has some discomfort at night occasionally, which is improving  He has a known history of rotator cuff tear preceding the recent dislocation  General ROS:  Negative for fever or chills  Neurological ROS:  Negative for numbness or tingling  Physical Exam     /82   Pulse 84   Ht 5' 7" (1 702 m)   Wt 93 9 kg (207 lb)   BMI 32 42 kg/m²     Right shoulder:  Tenderness:  None  Range of motion:   FF:  130/130   ER-abduction:  70/80   IR-abduction:  50/50  Strength:  FF 4/5  Impingement signs:  Negative  Empty can test:  Positive  Speed's test:  Negative  Cross-body adduction test:  Negative  Anterior apprehension test:  Negative  CV:  2+ radial pulse      Social History     Tobacco Use    Smoking status: Former Smoker     Packs/day: 6 00     Years: 20 00     Pack years: 120 00     Quit date: 1978     Years since quittin 0    Smokeless tobacco: Never Used   Substance Use Topics    Alcohol use: Yes     Alcohol/week: 2 0 standard drinks     Types: 2 Glasses of wine per week     Comment: 3-4 times per week    Drug use:  No

## 2021-04-15 DIAGNOSIS — I73.9 CLAUDICATION IN PERIPHERAL VASCULAR DISEASE (HCC): ICD-10-CM

## 2021-04-15 RX ORDER — CILOSTAZOL 100 MG/1
100 TABLET ORAL 2 TIMES DAILY
Qty: 180 TABLET | Refills: 3 | Status: SHIPPED | OUTPATIENT
Start: 2021-04-15 | End: 2022-05-24 | Stop reason: SDUPTHER

## 2021-06-11 ENCOUNTER — HOSPITAL ENCOUNTER (OUTPATIENT)
Dept: VASCULAR ULTRASOUND | Facility: HOSPITAL | Age: 86
Discharge: HOME | End: 2021-06-11
Attending: SURGERY
Payer: COMMERCIAL

## 2021-06-11 DIAGNOSIS — I73.9 PERIPHERAL ARTERIAL DISEASE (HCC): ICD-10-CM

## 2021-06-11 PROCEDURE — 93925 LOWER EXTREMITY STUDY: CPT

## 2021-06-11 PROCEDURE — 93923 UPR/LXTR ART STDY 3+ LVLS: CPT

## 2021-06-15 PROCEDURE — 93922 UPR/L XTREMITY ART 2 LEVELS: CPT | Performed by: SURGERY

## 2021-06-15 PROCEDURE — 93925 LOWER EXTREMITY STUDY: CPT | Performed by: SURGERY

## 2021-06-17 ENCOUNTER — TELEPHONE (OUTPATIENT)
Dept: VASCULAR SURGERY | Facility: CLINIC | Age: 86
End: 2021-06-17

## 2021-06-17 NOTE — TELEPHONE ENCOUNTER
Message  Received: Bakari Crow [facility]  Call patient for OV with Adalberto Bravo           VAS lower limb arterial duplex, complete bilateral    Spoke w/ patioent ov w/ cortes 7/15

## 2021-07-15 ENCOUNTER — OFFICE VISIT (OUTPATIENT)
Dept: VASCULAR SURGERY | Facility: CLINIC | Age: 86
End: 2021-07-15
Payer: COMMERCIAL

## 2021-07-15 VITALS
RESPIRATION RATE: 18 BRPM | WEIGHT: 210 LBS | HEIGHT: 67 IN | HEART RATE: 72 BPM | DIASTOLIC BLOOD PRESSURE: 86 MMHG | TEMPERATURE: 97.4 F | BODY MASS INDEX: 32.96 KG/M2 | SYSTOLIC BLOOD PRESSURE: 140 MMHG

## 2021-07-15 DIAGNOSIS — I73.9 PERIPHERAL ARTERIAL DISEASE (HCC): Primary | ICD-10-CM

## 2021-07-15 PROCEDURE — 99213 OFFICE O/P EST LOW 20 MIN: CPT | Performed by: SURGERY

## 2021-07-15 PROCEDURE — 1036F TOBACCO NON-USER: CPT | Performed by: SURGERY

## 2021-07-15 PROCEDURE — 1160F RVW MEDS BY RX/DR IN RCRD: CPT | Performed by: SURGERY

## 2021-07-15 NOTE — PATIENT INSTRUCTIONS
Patient presents with follow-up regarding claudication symptoms, he is doing well from that standpoint no ischemic rest pain or tissue loss  Recent lower extremity arterial study shows stable peripheral circulation with no concern from a claudication or limb loss standpoint      Plan:  Follow-up lower extremity arterial study in 6 months

## 2021-07-15 NOTE — LETTER
July 15, 2021     Joana Sterling MD  [address] [place] [id number]    Patient: Qasim Wilson   YOB: 1935   Date of Visit: 7/15/2021       Dear Dr Akhil Steiner: Thank you for referring Racquel Calix to me for evaluation  Below are my notes for this consultation  If you have questions, please do not hesitate to call me  I look forward to following your patient along with you           Sincerely,        Tristian Matt MD        CC: No Recipients

## 2021-07-15 NOTE — PROGRESS NOTES
Assessment/Plan:     Patient presents with follow-up regarding claudication symptoms, he is doing well from that standpoint no ischemic rest pain or tissue loss  Recent lower extremity arterial study shows stable peripheral circulation with no concern from a claudication or limb loss standpoint  Plan:  Follow-up lower extremity arterial study in 6 months       Diagnoses and all orders for this visit:    Peripheral arterial disease ([facility] [address] )  -     VAS lower limb arterial duplex, complete bilateral; Future        Subjective:      Patient ID: Chencho Garces is a 81 y o  male  Patient presents in office to review the results of his YOANA done on 6/11/2021  Patient reports some R leg weakness  Patient reports he gets discomfort after walking about 400 feet  Patient denies any wounds or ulcers  Patient is taking aspirin and rosuvastatin  HPI    The following portions of the patient's history were reviewed and updated as appropriate: allergies, current medications, past family history, past medical history, past social history, past surgical history and problem list     Review of Systems   Constitutional: Negative  Negative for chills and fever  HENT: Positive for hearing loss  Negative for trouble swallowing  Eyes: Negative  Negative for visual disturbance  Respiratory: Negative  Negative for cough, chest tightness and shortness of breath  Cardiovascular: Positive for leg swelling (R foot)  Gastrointestinal: Negative  Negative for diarrhea, nausea and vomiting  Endocrine: Negative  Genitourinary: Negative  Musculoskeletal: Positive for gait problem (ambulates with cane)  Skin: Negative  Negative for wound  Allergic/Immunologic: Negative  Neurological: Positive for weakness and numbness (fingers)  Negative for dizziness, speech difficulty and headaches  Hematological: Negative  Does not bruise/bleed easily  Psychiatric/Behavioral: Negative    Negative for self-injury and suicidal ideas  Objective: There were no vitals taken for this visit  Physical Exam   No ankle edema at this time, capillary refill adequate  I have reviewed and made appropriate changes to the review of systems input by the medical assistant  Vitals:    07/15/21 1302   BP: 140/86   BP Location: Right arm   Patient Position: Sitting   Cuff Size: Adult   Pulse: 72   Resp: 18   Temp: (!) 97 4 °F (36 3 °C)   TempSrc: Tympanic   Weight: 95 3 kg (210 lb)   Height: 5' 7" (1 702 m)       Patient Active Problem List   Diagnosis    Peripheral neuropathy    Peripheral arterial disease (HCC)    Normal pressure hydrocephalus (HCC)    Neurologic gait dysfunction    Class 1 obesity in adult    Essential hypertension    Hyperlipidemia    S/P  shunt    Ambulatory dysfunction    Fall    Anterior dislocation of right shoulder    Rhabdomyolysis    Tear of right rotator cuff       Past Surgical History:   Procedure Laterality Date    CARDIAC SURGERY      CATARACT EXTRACTION, BILATERAL Bilateral     CA CREATE SHUNT:VENTRIC-PERITONEAL Right 2018    Procedure: FRONTAL VENTRICULOPERITONEAL SHUNT INSERTION;  Surgeon: Raul Chapman MD;  [facility];  Service: Neurosurgery    ROTATOR CUFF REPAIR Right        Family History   Problem Relation Age of Onset    Hodgkin's lymphoma Father     Cancer Brother        Social History     Socioeconomic History    Marital status: /Civil Union     Spouse name: Not on file    Number of children: Not on file    Years of education: Not on file    Highest education level: Not on file   Occupational History    Not on file   Tobacco Use    Smoking status: Former Smoker     Packs/day: 6 00     Years: 20 00     Pack years: 120 00     Quit date: 1978     Years since quittin 2    Smokeless tobacco: Never Used   Vaping Use    Vaping Use: Never used   Substance and Sexual Activity    Alcohol use:  Yes     Alcohol/week: 2 0 standard drinks     Types: 2 Glasses of wine per week     Comment: 3-4 times per week    Drug use: No    Sexual activity: Never     Partners: Female   Other Topics Concern    Not on file   Social History Narrative    Not on file     Social Determinants of Health     Financial Resource Strain:     Difficulty of Paying Living Expenses:    Food Insecurity:     Worried About Running Out of Food in the Last Year:     [address] in the Last Year:    Transportation Needs:     Lack of Transportation (Medical):      Lack of Transportation (Non-Medical):    Physical Activity:     Days of Exercise per Week:     Minutes of Exercise per Session:    Stress:     Feeling of Stress :    Social Connections:     Frequency of Communication with Friends and Family:     Frequency of Social Gatherings with Friends and Family:     Attends [employer] Services:     Active Member of Clubs or Organizations:     Attends Club or Organization Meetings:     Marital Status:    Intimate Partner Violence:     Fear of Current or Ex-Partner:     Emotionally Abused:     Physically Abused:     Sexually Abused:        No Known Allergies      Current Outpatient Medications:     amLODIPine (NORVASC) 5 mg tablet, , Disp: , Rfl:     aspirin (ECOTRIN) 325 mg EC tablet, Take 325 mg by mouth every 6 (six) hours as needed for mild pain, Disp: , Rfl:     Cetirizine HCl (ZYRTEC ALLERGY) 10 MG CAPS, Take by mouth, Disp: , Rfl:     Cholecalciferol (VITAMIN D) 2000 units CAPS, Take by mouth, Disp: , Rfl:     cilostazol (PLETAL) 100 mg tablet, Take 1 tablet (100 mg total) by mouth 2 (two) times a day, Disp: 180 tablet, Rfl: 3    losartan (COZAAR) 50 mg tablet, , Disp: , Rfl:     metoprolol succinate (TOPROL XL) 50 mg 24 hr tablet, Take by mouth, Disp: , Rfl:     Multiple Vitamin (MULTIVITAMIN) capsule, Take 1 capsule by mouth daily, Disp: , Rfl:     Omega-3 1000 MG CAPS, Take by mouth, Disp: , Rfl:     rosuvastatin (CRESTOR) 10 MG tablet, Take by mouth, Disp: , Rfl:     Fluticasone Propionate (FLONASE NA), into each nostril (Patient not taking: Reported on 7/15/2021), Disp: , Rfl:

## 2021-08-22 ENCOUNTER — APPOINTMENT (OUTPATIENT)
Dept: RADIOLOGY | Facility: CLINIC | Age: 86
End: 2021-08-22
Payer: COMMERCIAL

## 2021-08-22 ENCOUNTER — OFFICE VISIT (OUTPATIENT)
Dept: URGENT CARE | Facility: CLINIC | Age: 86
End: 2021-08-22
Payer: COMMERCIAL

## 2021-08-22 VITALS — TEMPERATURE: 96.7 F | OXYGEN SATURATION: 97 % | HEART RATE: 60 BPM | RESPIRATION RATE: 14 BRPM

## 2021-08-22 DIAGNOSIS — T14.90XA INJURY: ICD-10-CM

## 2021-08-22 DIAGNOSIS — S63.637A SPRAIN OF INTERPHALANGEAL JOINT OF LEFT LITTLE FINGER, INITIAL ENCOUNTER: Primary | ICD-10-CM

## 2021-08-22 PROCEDURE — 73140 X-RAY EXAM OF FINGER(S): CPT

## 2021-08-22 PROCEDURE — 99213 OFFICE O/P EST LOW 20 MIN: CPT | Performed by: PHYSICIAN ASSISTANT

## 2021-08-22 NOTE — PATIENT INSTRUCTIONS
Xray today shows no sign of fracture  Placed in a froggy splint for comfort  Can wear the splint for about 1 week  Apply ice to the area and take ibuprofen and tylenol for the pain  If symptoms worsen follow up with primary care doctor

## 2021-11-02 ENCOUNTER — IMMUNIZATIONS (OUTPATIENT)
Dept: FAMILY MEDICINE CLINIC | Facility: HOSPITAL | Age: 86
End: 2021-11-02

## 2021-11-02 DIAGNOSIS — Z23 ENCOUNTER FOR IMMUNIZATION: Primary | ICD-10-CM

## 2022-01-11 ENCOUNTER — TELEPHONE (OUTPATIENT)
Dept: NEUROSURGERY | Facility: CLINIC | Age: 87
End: 2022-01-11

## 2022-01-11 NOTE — TELEPHONE ENCOUNTER
----- Message from Lorna Casarez RN sent at 2/16/2021  1:37 PM EST -----  Regarding: NPH-1 yr f/u (due in Feb 2022)  Please contact patient "Felipe Padilla" for 1 year f/u through [facility] clinic due Feb 2022

## 2022-01-11 NOTE — TELEPHONE ENCOUNTER
Called patient to schedule his yearly nph follow up appt  Patient states that he is "doing well from a hydrocephalus standpoint"  He prefers not to follow up at this time because of covid and it's difficult for him to get around etc   He took down my direct number and I encouraged him to call any time if he would like to schedule a follow up appt  He was appreciative and verbalized understanding

## 2022-02-17 ENCOUNTER — TELEPHONE (OUTPATIENT)
Dept: GASTROENTEROLOGY | Facility: CLINIC | Age: 87
End: 2022-02-17

## 2022-02-17 NOTE — TELEPHONE ENCOUNTER
Can you call pt? He is scheduled for appt with Dr Gloria Jeffersonday  He is having diarrhea and blood in stool  He said he feels pressure then has blood with the diarrhea  Just give you opinion if he should be seen @ the ER or if he can wait for scheduled appt  Thank you

## 2022-02-17 NOTE — TELEPHONE ENCOUNTER
Returned pts call  He wants to schedule an appt  Has seen Dr Radha Phoenix in the past  Diarrhea is chief complaint  I called back and lmom for him to call back to get scheduled  If he calls back please get him scheduled  He also asked when his next colon is due  [address] recall says 9/23

## 2022-02-17 NOTE — TELEPHONE ENCOUNTER
I called and spoke to patient  He does not want to go to the ER  He said he can wait for his appt  I told him to c/b if his symptoms get worse  He verbalized understanding   Thank you

## 2022-02-22 ENCOUNTER — OFFICE VISIT (OUTPATIENT)
Dept: GASTROENTEROLOGY | Facility: CLINIC | Age: 87
End: 2022-02-22
Payer: COMMERCIAL

## 2022-02-22 VITALS
DIASTOLIC BLOOD PRESSURE: 88 MMHG | SYSTOLIC BLOOD PRESSURE: 136 MMHG | BODY MASS INDEX: 32.96 KG/M2 | WEIGHT: 210 LBS | HEART RATE: 82 BPM | HEIGHT: 67 IN

## 2022-02-22 DIAGNOSIS — Z86.010 HISTORY OF COLON POLYPS: ICD-10-CM

## 2022-02-22 DIAGNOSIS — R19.7 DIARRHEA OF PRESUMED INFECTIOUS ORIGIN: Primary | ICD-10-CM

## 2022-02-22 DIAGNOSIS — K62.5 RECTAL BLEEDING: ICD-10-CM

## 2022-02-22 PROBLEM — Z86.0100 HISTORY OF COLON POLYPS: Status: ACTIVE | Noted: 2022-02-22

## 2022-02-22 PROCEDURE — 99203 OFFICE O/P NEW LOW 30 MIN: CPT | Performed by: INTERNAL MEDICINE

## 2022-02-22 NOTE — PROGRESS NOTES
[facility] - Outpatient Consultation  Miri Dejesus 81 y o  male MRN: [id number]  Encounter: [id number]        ASSESSMENT AND PLAN:      1  Diarrhea of presumed infectious origin  symptoms are most suggestive of an acute infectious process, and do seem to be slowly improving  We will continue to push fluids, continue Pepto-Bismol as needed  Will evaluate further as below  Follow-up here in several weeks  If symptoms persist or worsen, will consider further evaluation with colonoscopy though this does not seem necessary at present     - CBC; Future  - Comprehensive metabolic panel; Future  - Stool Enteric Bacterial Panel by PCR; Future    2  Rectal bleeding  Likely hemorrhoidal or possibly related to inflammation from infection  Other inflammatory, ischemic or neoplastic processes are possible, though unlikely  3  History of colon polyps  Last colonoscopy approximately 3 and half years ago      ______________________________________________________________________    HPI:  Patient with history of adenomatous colonic polyps on multiple prior colonoscopies in 2015 and 2018 now presents with change in bowel habit over the past 3 weeks with increased frequency of loose stool with occasional scant hematochezia  Initially experienced nausea but no vomiting as well as some abdominal cramping though these have resolved  Has been moving his bowels 3-4 times daily  Denies nocturnal symptoms  Feels his symptoms seem to be slowly improving over time  Has been taking Pepto-Bismol  He believes this likely represents a viral illness  No ill contacts, no recent travel, no jaundice or rash, fever chills, bruising, unexplained weight loss  Lives with his wife who does not have similar symptoms  REVIEW OF SYSTEMS:    Review of Systems   Constitutional: Negative for fever and weight loss  Musculoskeletal: Negative for myalgias     Gastrointestinal: Positive for abdominal pain, diarrhea and hematochezia  Negative for anorexia, constipation, flatus and vomiting  Genitourinary: Negative for dysuria, frequency and hematuria  Neurological: Negative for headaches  All other systems reviewed and are negative         Historical Information   Past Medical History:   Diagnosis Date    Allergic     Arthritis     Coronary artery disease     GERD (gastroesophageal reflux disease)     History of heart artery stent 2001    x2    History of transfusion 1988    Hydrocephalus ([address] )     Hyperlipidemia     Hypertension     Obesity     Visual impairment      Past Surgical History:   Procedure Laterality Date    CARDIAC SURGERY      CATARACT EXTRACTION, BILATERAL Bilateral     CT CREATE SHUNT:VENTRIC-PERITONEAL Right 2018    Procedure: FRONTAL VENTRICULOPERITONEAL SHUNT INSERTION;  Surgeon: Shannon Nava MD;  [facility];  Service: Neurosurgery    ROTATOR CUFF REPAIR Right 2016     Social History   Social History     Substance and Sexual Activity   Alcohol Use Yes    Alcohol/week: 2 0 standard drinks    Types: 2 Glasses of wine per week    Comment: 3-4 times per week     Social History     Substance and Sexual Activity   Drug Use No     Social History     Tobacco Use   Smoking Status Former Smoker    Packs/day: 6 00    Years: 20 00    Pack years: 120 00    Quit date: 1978    Years since quittin 8   Smokeless Tobacco Never Used     Family History   Problem Relation Age of Onset    Hodgkin's lymphoma Father     Cancer Brother        Meds/Allergies       Current Outpatient Medications:     amLODIPine (NORVASC) 5 mg tablet    aspirin (ECOTRIN) 325 mg EC tablet    Cholecalciferol (VITAMIN D) 2000 units CAPS    cilostazol (PLETAL) 100 mg tablet    losartan (COZAAR) 50 mg tablet    metoprolol succinate (TOPROL XL) 50 mg 24 hr tablet    Multiple Vitamin (MULTIVITAMIN) capsule    Omega-3 1000 MG CAPS    rosuvastatin (CRESTOR) 10 MG tablet    Cetirizine HCl (ZYRTEC ALLERGY) 10 MG CAPS    Fluticasone Propionate (FLONASE NA)    No Known Allergies        Objective     Blood pressure 136/88, pulse 82, height 5' 7" (1 702 m), weight 95 3 kg (210 lb)  Body mass index is 32 89 kg/m²  PHYSICAL EXAM:      Physical Exam  Vitals and nursing note reviewed  Constitutional:       General: He is not in acute distress  HENT:      Head:      Comments: Surgical scar from the  shunt placement  Eyes:      General: No scleral icterus  Pupils: Pupils are equal, round, and reactive to light  Cardiovascular:      Rate and Rhythm: Normal rate and regular rhythm  Pulmonary:      Effort: Pulmonary effort is normal  No respiratory distress  Abdominal:      General: There is no distension  Palpations: Abdomen is soft  Tenderness: There is no abdominal tenderness  There is no guarding or rebound  Musculoskeletal:         General: Normal range of motion  Cervical back: Normal range of motion and neck supple  Right lower leg: No edema  Left lower leg: No edema  Skin:     General: Skin is warm and dry  Neurological:      General: No focal deficit present  Mental Status: He is alert and oriented to person, place, and time  Psychiatric:         Mood and Affect: Mood normal          Behavior: Behavior normal               Lab Results:   No visits with results within 1 Day(s) from this visit     Latest known visit with results is:   Transcribe Orders on 03/11/2021   Component Date Value    WBC 03/11/2021 8 96     RBC 03/11/2021 4 57     Hemoglobin 03/11/2021 14 1     Hematocrit 03/11/2021 43 2     MCV 03/11/2021 95     MCH 03/11/2021 30 9     MCHC 03/11/2021 32 6     RDW 03/11/2021 13 2     MPV 03/11/2021 9 5     Platelets 94/22/1413 331     nRBC 03/11/2021 0     Neutrophils Relative 03/11/2021 53     Immat GRANS % 03/11/2021 0     Lymphocytes Relative 03/11/2021 30     Monocytes Relative 03/11/2021 10     Eosinophils Relative 03/11/2021 6     Basophils Relative 03/11/2021 1     Neutrophils Absolute 03/11/2021 4 82     Immature Grans Absolute 03/11/2021 0 03     Lymphocytes Absolute 03/11/2021 2 65     Monocytes Absolute 03/11/2021 0 88     Eosinophils Absolute 03/11/2021 0 49     Basophils Absolute 03/11/2021 0 09     Sodium 03/11/2021 137     Potassium 03/11/2021 3 9     Chloride 03/11/2021 102     CO2 03/11/2021 25     ANION GAP 03/11/2021 10     BUN 03/11/2021 11     Creatinine 03/11/2021 1 09     Glucose, Fasting 03/11/2021 121*    Calcium 03/11/2021 8 7     AST 03/11/2021 21     ALT 03/11/2021 33     Alkaline Phosphatase 03/11/2021 82     Total Protein 03/11/2021 7 7     Albumin 03/11/2021 3 8     Total Bilirubin 03/11/2021 0 40     eGFR 03/11/2021 62     Cholesterol 03/11/2021 116     Triglycerides 03/11/2021 119     HDL, Direct 03/11/2021 59     LDL Calculated 03/11/2021 33     Non-HDL-Chol (CHOL-HDL) 03/11/2021 57          Radiology Results:   No results found    Answers for HPI/ROS submitted by the patient on 2/17/2022  Chronicity: new  Onset: 1 to 4 weeks ago  Onset quality: undetermined  Frequency: daily  arthralgias: No  belching: No  Aggravated by: nothing  Relieved by: nothing

## 2022-02-23 ENCOUNTER — LAB (OUTPATIENT)
Dept: LAB | Facility: CLINIC | Age: 87
End: 2022-02-23
Payer: COMMERCIAL

## 2022-02-23 DIAGNOSIS — R19.7 DIARRHEA OF PRESUMED INFECTIOUS ORIGIN: ICD-10-CM

## 2022-02-23 LAB
ALBUMIN SERPL BCP-MCNC: 3.7 G/DL (ref 3.5–5)
ALP SERPL-CCNC: 76 U/L (ref 46–116)
ALT SERPL W P-5'-P-CCNC: 30 U/L (ref 12–78)
ANION GAP SERPL CALCULATED.3IONS-SCNC: 8 MMOL/L (ref 4–13)
AST SERPL W P-5'-P-CCNC: 25 U/L (ref 5–45)
BILIRUB SERPL-MCNC: 0.63 MG/DL (ref 0.2–1)
BUN SERPL-MCNC: 9 MG/DL (ref 5–25)
CALCIUM SERPL-MCNC: 9.2 MG/DL (ref 8.3–10.1)
CHLORIDE SERPL-SCNC: 107 MMOL/L (ref 100–108)
CO2 SERPL-SCNC: 23 MMOL/L (ref 21–32)
CREAT SERPL-MCNC: 1.08 MG/DL (ref 0.6–1.3)
ERYTHROCYTE [DISTWIDTH] IN BLOOD BY AUTOMATED COUNT: 13.6 % (ref 11.6–15.1)
GFR SERPL CREATININE-BSD FRML MDRD: 61 ML/MIN/1.73SQ M
GLUCOSE P FAST SERPL-MCNC: 117 MG/DL (ref 65–99)
HCT VFR BLD AUTO: 43.8 % (ref 36.5–49.3)
HGB BLD-MCNC: 14.7 G/DL (ref 12–17)
MCH RBC QN AUTO: 31.1 PG (ref 26.8–34.3)
MCHC RBC AUTO-ENTMCNC: 33.6 G/DL (ref 31.4–37.4)
MCV RBC AUTO: 93 FL (ref 82–98)
PLATELET # BLD AUTO: 297 THOUSANDS/UL (ref 149–390)
PMV BLD AUTO: 10.4 FL (ref 8.9–12.7)
POTASSIUM SERPL-SCNC: 3.9 MMOL/L (ref 3.5–5.3)
PROT SERPL-MCNC: 8 G/DL (ref 6.4–8.2)
RBC # BLD AUTO: 4.72 MILLION/UL (ref 3.88–5.62)
SODIUM SERPL-SCNC: 138 MMOL/L (ref 136–145)
WBC # BLD AUTO: 10.01 THOUSAND/UL (ref 4.31–10.16)

## 2022-02-23 PROCEDURE — 36415 COLL VENOUS BLD VENIPUNCTURE: CPT

## 2022-02-23 PROCEDURE — 85027 COMPLETE CBC AUTOMATED: CPT

## 2022-02-23 PROCEDURE — 80053 COMPREHEN METABOLIC PANEL: CPT

## 2022-02-24 ENCOUNTER — LAB (OUTPATIENT)
Dept: LAB | Facility: CLINIC | Age: 87
End: 2022-02-24
Payer: COMMERCIAL

## 2022-02-24 DIAGNOSIS — R19.7 DIARRHEA OF PRESUMED INFECTIOUS ORIGIN: ICD-10-CM

## 2022-02-24 LAB
CAMPYLOBACTER DNA SPEC NAA+PROBE: NORMAL
SALMONELLA DNA SPEC QL NAA+PROBE: NORMAL
SHIGA TOXIN STX GENE SPEC NAA+PROBE: NORMAL
SHIGELLA DNA SPEC QL NAA+PROBE: NORMAL

## 2022-02-24 PROCEDURE — 87505 NFCT AGENT DETECTION GI: CPT

## 2022-02-24 NOTE — RESULT ENCOUNTER NOTE
Please call the patient regarding his result  Blood work was unremarkable, though Glucose was somewhat elevated if he was fasting at the time    Stool study results not yet available

## 2022-02-25 ENCOUNTER — TELEPHONE (OUTPATIENT)
Dept: GASTROENTEROLOGY | Facility: CLINIC | Age: 87
End: 2022-02-25

## 2022-02-25 NOTE — RESULT ENCOUNTER NOTE
Please call the patient regarding his result  No specific infection identified    Follow-up in a few weeks

## 2022-02-25 NOTE — TELEPHONE ENCOUNTER
Left msg for pt - advised to continue taking Pepto-Bismol 4 times a day, 2 tablets-advised that if significant abdominal pain or diarrhea he should go to ER over the weekend but otherwise he should call Monday with an update

## 2022-02-25 NOTE — TELEPHONE ENCOUNTER
PT INFORMED OF BW AND STOOL RESULTS, PT WITH CONTINUED C/O ABDOMINAL CRAMPING AND DIARRHEA, WILL SCHEDULE F/U APPT IN 1-2 WEEKS  PT TAKING PEPTO BISMOL TABLETS, ANY FURTHER RECOMMENDATIONS?

## 2022-03-04 ENCOUNTER — OFFICE VISIT (OUTPATIENT)
Dept: GASTROENTEROLOGY | Facility: CLINIC | Age: 87
End: 2022-03-04
Payer: COMMERCIAL

## 2022-03-04 VITALS
SYSTOLIC BLOOD PRESSURE: 141 MMHG | BODY MASS INDEX: 32.65 KG/M2 | DIASTOLIC BLOOD PRESSURE: 77 MMHG | HEART RATE: 91 BPM | WEIGHT: 208 LBS | HEIGHT: 67 IN

## 2022-03-04 DIAGNOSIS — R19.7 DIARRHEA OF PRESUMED INFECTIOUS ORIGIN: Primary | ICD-10-CM

## 2022-03-04 PROCEDURE — 99213 OFFICE O/P EST LOW 20 MIN: CPT | Performed by: INTERNAL MEDICINE

## 2022-03-04 NOTE — PROGRESS NOTES
[facility] - Outpatient Follow-up Note  Blondell Litten 81 y o  male MRN: [id number]  Encounter: [id number]          ASSESSMENT AND PLAN:      1  Diarrhea of presumed infectious origin  We discussed the option of watchful waiting versus further evaluation with colonoscopy and will pursue the former course  Will follow-up here in several weeks for re-evaluation or will contact this office if his symptoms worsen or change     ______________________________________________________________________    SUBJECTIVE:  Patient returns in follow-up of change in bowel habit with diarrhea and occasional abdominal cramping  Has had no further evidence of hematochezia  Reports his diarrhea is slowly improving but he continues to use Pepto-Bismol and occasional Imodium  Occasional fleeting abdominal cramping with nausea approximately every other day lasting seconds at a time  No other new or different symptoms  Laboratory testing from his last visit was unrevealing with the exception of mildly elevated glucose though he was not fasting      REVIEW OF SYSTEMS:    Review of Systems   Gastrointestinal: Positive for change in bowel habit, diarrhea and hemorrhoids  Negative for bloating, abdominal pain, anorexia, bowel incontinence, constipation, heartburn, hematemesis, hematochezia, jaundice and vomiting            Historical Information   Past Medical History:   Diagnosis Date    Allergic     Arthritis     CAD (coronary artery disease)     Colon polyp     Coronary artery disease     GERD (gastroesophageal reflux disease)     History of heart artery stent 2001    x2    History of transfusion 1988    Hydrocephalus ([address] )     Hyperlipidemia     Hypertension     Obesity     Visual impairment      Past Surgical History:   Procedure Laterality Date    CARDIAC SURGERY      CATARACT EXTRACTION, BILATERAL Bilateral 2014    COLONOSCOPY      CA CREATE SHUNT:VENTRIC-PERITONEAL Right 7/11/2018 Procedure: FRONTAL VENTRICULOPERITONEAL SHUNT INSERTION;  Surgeon: Rosy Currie MD;  [facility];  Service: Neurosurgery    ROTATOR CUFF REPAIR Right 2016    UPPER GASTROINTESTINAL ENDOSCOPY       Social History   Social History     Substance and Sexual Activity   Alcohol Use Yes    Alcohol/week: 2 0 standard drinks    Types: 2 Glasses of wine per week    Comment: 3-4 times per week     Social History     Substance and Sexual Activity   Drug Use No     Social History     Tobacco Use   Smoking Status Former Smoker    Packs/day: 6 00    Years: 20 00    Pack years: 120 00    Quit date: 1978    Years since quittin 8   Smokeless Tobacco Never Used     Family History   Problem Relation Age of Onset    Hodgkin's lymphoma Father     Colorectal Cancer Brother        Meds/Allergies       Current Outpatient Medications:     amLODIPine (NORVASC) 5 mg tablet    aspirin (ECOTRIN) 325 mg EC tablet    Cholecalciferol (VITAMIN D) 2000 units CAPS    cilostazol (PLETAL) 100 mg tablet    losartan (COZAAR) 50 mg tablet    metoprolol succinate (TOPROL XL) 50 mg 24 hr tablet    Multiple Vitamin (MULTIVITAMIN) capsule    Omega-3 1000 MG CAPS    rosuvastatin (CRESTOR) 10 MG tablet    Cetirizine HCl (ZYRTEC ALLERGY) 10 MG CAPS    Fluticasone Propionate (FLONASE NA)    No Known Allergies        Objective     Blood pressure 141/77, pulse 91, height 5' 7" (1 702 m), weight 94 3 kg (208 lb)  Body mass index is 32 58 kg/m²  PHYSICAL EXAM:      Physical Exam  Vitals and nursing note reviewed  Constitutional:       General: He is not in acute distress  HENT:      Head: Normocephalic and atraumatic  Mouth/Throat:      Mouth: Mucous membranes are moist    Eyes:      General: No scleral icterus  Pupils: Pupils are equal, round, and reactive to light  Cardiovascular:      Rate and Rhythm: Normal rate and regular rhythm     Pulmonary:      Effort: Pulmonary effort is normal  No respiratory distress  Abdominal:      General: There is no distension  Palpations: Abdomen is soft  Tenderness: There is no abdominal tenderness  There is no guarding or rebound  Musculoskeletal:         General: Normal range of motion  Cervical back: Normal range of motion and neck supple  Skin:     General: Skin is warm and dry  Neurological:      General: No focal deficit present  Mental Status: He is alert and oriented to person, place, and time  Psychiatric:         Mood and Affect: Mood normal          Behavior: Behavior normal           Lab Results:   No visits with results within 1 Day(s) from this visit  Latest known visit with results is:   Lab on 02/24/2022   Component Date Value    Salmonella sp PCR 02/24/2022 None Detected     Shigella sp/Enteroinvasi* 02/24/2022 None Detected     Campylobacter sp (jejuni* 02/24/2022 None Detected     Shiga toxin 1/Shiga toxi* 02/24/2022 None Detected          Radiology Results:   No results found

## 2022-05-24 DIAGNOSIS — I73.9 CLAUDICATION IN PERIPHERAL VASCULAR DISEASE (HCC): ICD-10-CM

## 2022-05-24 RX ORDER — CILOSTAZOL 100 MG/1
100 TABLET ORAL 2 TIMES DAILY
Qty: 180 TABLET | Refills: 3 | Status: SHIPPED | OUTPATIENT
Start: 2022-05-24

## 2022-05-24 NOTE — TELEPHONE ENCOUNTER
Pt called and requested a 90 day refill of  cilostazol to be sent to [facility]  Pt takes this for claudication

## 2022-08-26 ENCOUNTER — APPOINTMENT (OUTPATIENT)
Dept: LAB | Facility: CLINIC | Age: 87
End: 2022-08-26
Payer: COMMERCIAL

## 2022-08-26 DIAGNOSIS — E78.5 HYPERLIPIDEMIA, UNSPECIFIED HYPERLIPIDEMIA TYPE: ICD-10-CM

## 2022-08-26 DIAGNOSIS — I35.0 NODULAR CALCIFIC AORTIC VALVE STENOSIS: ICD-10-CM

## 2022-08-26 DIAGNOSIS — I25.10 ATHEROSCLEROSIS OF NATIVE CORONARY ARTERY, UNSPECIFIED WHETHER ANGINA PRESENT, UNSPECIFIED WHETHER NATIVE OR TRANSPLANTED HEART: ICD-10-CM

## 2022-08-26 DIAGNOSIS — I10 ESSENTIAL HYPERTENSION, MALIGNANT: ICD-10-CM

## 2022-08-26 LAB
ALBUMIN SERPL BCP-MCNC: 3.7 G/DL (ref 3.5–5)
ALP SERPL-CCNC: 78 U/L (ref 46–116)
ALT SERPL W P-5'-P-CCNC: 36 U/L (ref 12–78)
ANION GAP SERPL CALCULATED.3IONS-SCNC: 7 MMOL/L (ref 4–13)
AST SERPL W P-5'-P-CCNC: 24 U/L (ref 5–45)
BASOPHILS # BLD AUTO: 0.07 THOUSANDS/ΜL (ref 0–0.1)
BASOPHILS NFR BLD AUTO: 1 % (ref 0–1)
BILIRUB SERPL-MCNC: 0.44 MG/DL (ref 0.2–1)
BUN SERPL-MCNC: 9 MG/DL (ref 5–25)
CALCIUM SERPL-MCNC: 8.9 MG/DL (ref 8.3–10.1)
CHLORIDE SERPL-SCNC: 105 MMOL/L (ref 96–108)
CO2 SERPL-SCNC: 23 MMOL/L (ref 21–32)
CREAT SERPL-MCNC: 1.04 MG/DL (ref 0.6–1.3)
EOSINOPHIL # BLD AUTO: 0.33 THOUSAND/ΜL (ref 0–0.61)
EOSINOPHIL NFR BLD AUTO: 4 % (ref 0–6)
ERYTHROCYTE [DISTWIDTH] IN BLOOD BY AUTOMATED COUNT: 13.6 % (ref 11.6–15.1)
GFR SERPL CREATININE-BSD FRML MDRD: 64 ML/MIN/1.73SQ M
GLUCOSE P FAST SERPL-MCNC: 126 MG/DL (ref 65–99)
HCT VFR BLD AUTO: 43.5 % (ref 36.5–49.3)
HGB BLD-MCNC: 14.8 G/DL (ref 12–17)
IMM GRANULOCYTES # BLD AUTO: 0.03 THOUSAND/UL (ref 0–0.2)
IMM GRANULOCYTES NFR BLD AUTO: 0 % (ref 0–2)
LYMPHOCYTES # BLD AUTO: 2.28 THOUSANDS/ΜL (ref 0.6–4.47)
LYMPHOCYTES NFR BLD AUTO: 25 % (ref 14–44)
MCH RBC QN AUTO: 31.6 PG (ref 26.8–34.3)
MCHC RBC AUTO-ENTMCNC: 34 G/DL (ref 31.4–37.4)
MCV RBC AUTO: 93 FL (ref 82–98)
MONOCYTES # BLD AUTO: 0.75 THOUSAND/ΜL (ref 0.17–1.22)
MONOCYTES NFR BLD AUTO: 8 % (ref 4–12)
NEUTROPHILS # BLD AUTO: 5.69 THOUSANDS/ΜL (ref 1.85–7.62)
NEUTS SEG NFR BLD AUTO: 62 % (ref 43–75)
NRBC BLD AUTO-RTO: 0 /100 WBCS
PLATELET # BLD AUTO: 248 THOUSANDS/UL (ref 149–390)
PMV BLD AUTO: 10.4 FL (ref 8.9–12.7)
POTASSIUM SERPL-SCNC: 3.8 MMOL/L (ref 3.5–5.3)
PROT SERPL-MCNC: 7.9 G/DL (ref 6.4–8.4)
RBC # BLD AUTO: 4.68 MILLION/UL (ref 3.88–5.62)
SODIUM SERPL-SCNC: 135 MMOL/L (ref 135–147)
WBC # BLD AUTO: 9.15 THOUSAND/UL (ref 4.31–10.16)

## 2022-08-26 PROCEDURE — 36415 COLL VENOUS BLD VENIPUNCTURE: CPT

## 2022-08-26 PROCEDURE — 85025 COMPLETE CBC W/AUTO DIFF WBC: CPT

## 2022-08-26 PROCEDURE — 80053 COMPREHEN METABOLIC PANEL: CPT

## 2022-10-11 PROBLEM — R19.7 DIARRHEA OF PRESUMED INFECTIOUS ORIGIN: Status: RESOLVED | Noted: 2022-02-22 | Resolved: 2022-10-11

## 2023-01-18 ENCOUNTER — APPOINTMENT (OUTPATIENT)
Dept: LAB | Facility: CLINIC | Age: 88
End: 2023-01-18

## 2023-01-18 ENCOUNTER — TRANSCRIBE ORDERS (OUTPATIENT)
Dept: LAB | Facility: CLINIC | Age: 88
End: 2023-01-18

## 2023-01-18 DIAGNOSIS — I10 ESSENTIAL HYPERTENSION, BENIGN: ICD-10-CM

## 2023-01-18 DIAGNOSIS — G91.2 LOW PRESSURE HYDROCEPHALUS (HCC): Primary | ICD-10-CM

## 2023-01-18 DIAGNOSIS — I25.10 ATHEROSCLEROSIS OF NATIVE CORONARY ARTERY, UNSPECIFIED WHETHER ANGINA PRESENT, UNSPECIFIED WHETHER NATIVE OR TRANSPLANTED HEART: ICD-10-CM

## 2023-01-18 LAB
ALBUMIN SERPL BCP-MCNC: 4.1 G/DL (ref 3.5–5)
ALP SERPL-CCNC: 75 U/L (ref 46–116)
ALT SERPL W P-5'-P-CCNC: 35 U/L (ref 12–78)
ANION GAP SERPL CALCULATED.3IONS-SCNC: 8 MMOL/L (ref 4–13)
AST SERPL W P-5'-P-CCNC: 25 U/L (ref 5–45)
BASOPHILS # BLD AUTO: 0.15 THOUSANDS/ÂΜL (ref 0–0.1)
BASOPHILS NFR BLD AUTO: 2 % (ref 0–1)
BILIRUB SERPL-MCNC: 0.65 MG/DL (ref 0.2–1)
BUN SERPL-MCNC: 8 MG/DL (ref 5–25)
CALCIUM SERPL-MCNC: 9.2 MG/DL (ref 8.3–10.1)
CHLORIDE SERPL-SCNC: 106 MMOL/L (ref 96–108)
CHOLEST SERPL-MCNC: 129 MG/DL
CO2 SERPL-SCNC: 25 MMOL/L (ref 21–32)
CREAT SERPL-MCNC: 1.18 MG/DL (ref 0.6–1.3)
EOSINOPHIL # BLD AUTO: 0.35 THOUSAND/ÂΜL (ref 0–0.61)
EOSINOPHIL NFR BLD AUTO: 4 % (ref 0–6)
ERYTHROCYTE [DISTWIDTH] IN BLOOD BY AUTOMATED COUNT: 13.5 % (ref 11.6–15.1)
GFR SERPL CREATININE-BSD FRML MDRD: 55 ML/MIN/1.73SQ M
GLUCOSE P FAST SERPL-MCNC: 135 MG/DL (ref 65–99)
HCT VFR BLD AUTO: 45.7 % (ref 36.5–49.3)
HDLC SERPL-MCNC: 61 MG/DL
HGB BLD-MCNC: 15.5 G/DL (ref 12–17)
IMM GRANULOCYTES # BLD AUTO: 0.03 THOUSAND/UL (ref 0–0.2)
IMM GRANULOCYTES NFR BLD AUTO: 0 % (ref 0–2)
LDLC SERPL CALC-MCNC: 45 MG/DL (ref 0–100)
LYMPHOCYTES # BLD AUTO: 3.09 THOUSANDS/ÂΜL (ref 0.6–4.47)
LYMPHOCYTES NFR BLD AUTO: 31 % (ref 14–44)
MCH RBC QN AUTO: 32.4 PG (ref 26.8–34.3)
MCHC RBC AUTO-ENTMCNC: 33.9 G/DL (ref 31.4–37.4)
MCV RBC AUTO: 95 FL (ref 82–98)
MONOCYTES # BLD AUTO: 1.05 THOUSAND/ÂΜL (ref 0.17–1.22)
MONOCYTES NFR BLD AUTO: 10 % (ref 4–12)
NEUTROPHILS # BLD AUTO: 5.38 THOUSANDS/ÂΜL (ref 1.85–7.62)
NEUTS SEG NFR BLD AUTO: 53 % (ref 43–75)
NONHDLC SERPL-MCNC: 68 MG/DL
NRBC BLD AUTO-RTO: 0 /100 WBCS
PLATELET # BLD AUTO: 305 THOUSANDS/UL (ref 149–390)
PMV BLD AUTO: 10.1 FL (ref 8.9–12.7)
POTASSIUM SERPL-SCNC: 3.9 MMOL/L (ref 3.5–5.3)
PROT SERPL-MCNC: 8.2 G/DL (ref 6.4–8.4)
RBC # BLD AUTO: 4.79 MILLION/UL (ref 3.88–5.62)
SODIUM SERPL-SCNC: 139 MMOL/L (ref 135–147)
TRIGL SERPL-MCNC: 115 MG/DL
WBC # BLD AUTO: 10.05 THOUSAND/UL (ref 4.31–10.16)

## 2023-01-19 ENCOUNTER — HOSPITAL ENCOUNTER (OUTPATIENT)
Dept: NON INVASIVE DIAGNOSTICS | Facility: HOSPITAL | Age: 88
Discharge: HOME | End: 2023-01-19

## 2023-01-19 VITALS
HEART RATE: 97 BPM | SYSTOLIC BLOOD PRESSURE: 141 MMHG | WEIGHT: 208 LBS | HEIGHT: 67 IN | DIASTOLIC BLOOD PRESSURE: 77 MMHG | BODY MASS INDEX: 32.65 KG/M2

## 2023-01-19 DIAGNOSIS — I35.0 NODULAR CALCIFIC AORTIC VALVE STENOSIS: ICD-10-CM

## 2023-01-20 LAB
AORTIC ROOT: 3 CM
AORTIC VALVE MEAN VELOCITY: 24.2 M/S
APICAL FOUR CHAMBER EJECTION FRACTION: 60 %
AV AREA BY CONTINUOUS VTI: 0.8 CM2
AV AREA PEAK VELOCITY: 0.7 CM2
AV LVOT MEAN GRADIENT: 3 MMHG
AV LVOT PEAK GRADIENT: 6 MMHG
AV MEAN GRADIENT: 28 MMHG
AV PEAK GRADIENT: 48 MMHG
AV VALVE AREA: 0.79 CM2
AV VELOCITY RATIO: 0.34
DOP CALC AO PEAK VEL: 3.46 M/S
DOP CALC AO VTI: 59.34 CM
DOP CALC LVOT AREA: 2.01 CM2
DOP CALC LVOT DIAMETER: 1.6 CM
DOP CALC LVOT PEAK VEL VTI: 23.24 CM
DOP CALC LVOT PEAK VEL: 1.17 M/S
DOP CALC LVOT STROKE INDEX: 21.8 ML/M2
DOP CALC LVOT STROKE VOLUME: 46.7 CM3
DOP CALC MV VTI: 29.83 CM
E WAVE DECELERATION TIME: 224 MS
FRACTIONAL SHORTENING: 36 % (ref 28–44)
INTERVENTRICULAR SEPTUM IN DIASTOLE (PARASTERNAL SHORT AXIS VIEW): 1 CM
INTERVENTRICULAR SEPTUM: 1 CM (ref 0.6–1.1)
LAAS-AP2: 15.9 CM2
LAAS-AP4: 17.1 CM2
LEFT ATRIUM SIZE: 3.1 CM
LEFT INTERNAL DIMENSION IN SYSTOLE: 2.9 CM (ref 2.1–4)
LEFT VENTRICULAR INTERNAL DIMENSION IN DIASTOLE: 4.5 CM (ref 3.5–6)
LEFT VENTRICULAR POSTERIOR WALL IN END DIASTOLE: 1 CM
LEFT VENTRICULAR STROKE VOLUME: 59 ML
LVSV (TEICH): 59 ML
MV E'TISSUE VEL-SEP: 5 CM/S
MV MEAN GRADIENT: 2 MMHG
MV PEAK A VEL: 1.18 M/S
MV PEAK E VEL: 57 CM/S
MV PEAK GRADIENT: 7 MMHG
MV STENOSIS PRESSURE HALF TIME: 65 MS
MV VALVE AREA BY CONTINUITY EQUATION: 1.57 CM2
MV VALVE AREA P 1/2 METHOD: 3.38 CM2
RA PRESSURE ESTIMATED: 8 MMHG
RIGHT ATRIUM AREA SYSTOLE A4C: 9.2 CM2
RIGHT VENTRICLE ID DIMENSION: 3 CM
RV PSP: 40 MMHG
SL CV LEFT ATRIUM LENGTH A2C: 4 CM
SL CV LV EF: 60
SL CV PED ECHO LEFT VENTRICLE DIASTOLIC VOLUME (MOD BIPLANE) 2D: 91 ML
SL CV PED ECHO LEFT VENTRICLE SYSTOLIC VOLUME (MOD BIPLANE) 2D: 31 ML
TR MAX PG: 32 MMHG
TR PEAK VELOCITY: 2.8 M/S
TRICUSPID ANNULAR PLANE SYSTOLIC EXCURSION: 2.2 CM
TRICUSPID VALVE PEAK REGURGITATION VELOCITY: 2.82 M/S

## 2023-05-05 DIAGNOSIS — I73.9 CLAUDICATION IN PERIPHERAL VASCULAR DISEASE (HCC): ICD-10-CM

## 2023-05-07 RX ORDER — CILOSTAZOL 100 MG/1
TABLET ORAL
Qty: 180 TABLET | Refills: 3 | Status: SHIPPED | OUTPATIENT
Start: 2023-05-07

## 2023-05-08 NOTE — TELEPHONE ENCOUNTER
"Per Dr Adames Portal last [facility] note: \"He is to remain on cilostazol and baby aspirin indefinitely as it does seem to have helped his mobility  \"      Pt is overdue for OV   Please schedule-thanks  "

## 2023-06-01 ENCOUNTER — HOSPITAL ENCOUNTER (OUTPATIENT)
Dept: NON INVASIVE DIAGNOSTICS | Facility: HOSPITAL | Age: 88
Discharge: HOME | End: 2023-06-01

## 2023-06-01 VITALS
DIASTOLIC BLOOD PRESSURE: 73 MMHG | HEART RATE: 93 BPM | WEIGHT: 208 LBS | HEIGHT: 67 IN | BODY MASS INDEX: 32.65 KG/M2 | SYSTOLIC BLOOD PRESSURE: 148 MMHG

## 2023-06-01 DIAGNOSIS — I35.0 NONRHEUMATIC AORTIC (VALVE) STENOSIS: ICD-10-CM

## 2023-06-01 LAB
AORTIC VALVE MEAN VELOCITY: 26.1 M/S
APICAL FOUR CHAMBER EJECTION FRACTION: 69 %
AV AREA BY CONTINUOUS VTI: 1 CM2
AV AREA PEAK VELOCITY: 1 CM2
AV LVOT MEAN GRADIENT: 2 MMHG
AV LVOT PEAK GRADIENT: 4 MMHG
AV MEAN GRADIENT: 29 MMHG
AV PEAK GRADIENT: 45 MMHG
AV VALVE AREA: 1.04 CM2
AV VELOCITY RATIO: 0.32
DOP CALC AO PEAK VEL: 3.35 M/S
DOP CALC AO VTI: 68.53 CM
DOP CALC LVOT AREA: 3.14 CM2
DOP CALC LVOT DIAMETER: 2 CM
DOP CALC LVOT PEAK VEL VTI: 22.62 CM
DOP CALC LVOT PEAK VEL: 1.06 M/S
DOP CALC LVOT STROKE INDEX: 36.4 ML/M2
DOP CALC LVOT STROKE VOLUME: 71.03
E WAVE DECELERATION TIME: 280 MS
LAAS-AP2: 17.4 CM2
LAAS-AP4: 13.5 CM2
MV E'TISSUE VEL-SEP: 7 CM/S
MV PEAK A VEL: 1.08 M/S
MV PEAK E VEL: 70 CM/S
MV STENOSIS PRESSURE HALF TIME: 81 MS
MV VALVE AREA P 1/2 METHOD: 2.72
RIGHT ATRIUM AREA SYSTOLE A4C: 12.8 CM2
RIGHT VENTRICLE ID DIMENSION: 3.8 CM
SL CV LEFT ATRIUM LENGTH A2C: 4.4 CM
TR MAX PG: 29 MMHG
TR PEAK VELOCITY: 2.7 M/S
TRICUSPID VALVE PEAK REGURGITATION VELOCITY: 2.69 M/S

## 2023-08-25 ENCOUNTER — APPOINTMENT (OUTPATIENT)
Dept: LAB | Facility: CLINIC | Age: 88
End: 2023-08-25
Payer: COMMERCIAL

## 2024-01-24 ENCOUNTER — TELEPHONE (OUTPATIENT)
Dept: GASTROENTEROLOGY | Facility: CLINIC | Age: 89
End: 2024-01-24

## 2024-01-24 NOTE — TELEPHONE ENCOUNTER
Left message on machine to tamara office back to schedule follow up appointment to discuss repeat colonoscopy with any provider or PA

## 2024-01-24 NOTE — TELEPHONE ENCOUNTER
Patient callback and stated he do not want to schedule and office visit and he do not want to have an colonoscopy as he is 88 years old and he dont want it.

## 2024-04-22 DIAGNOSIS — I73.9 CLAUDICATION IN PERIPHERAL VASCULAR DISEASE (HCC): ICD-10-CM

## 2024-04-24 RX ORDER — CILOSTAZOL 100 MG/1
TABLET ORAL
Qty: 90 TABLET | Refills: 0 | Status: SHIPPED | OUTPATIENT
Start: 2024-04-24

## 2024-04-24 NOTE — TELEPHONE ENCOUNTER
Refill placed for 60 days.  Howvever, he has not been seen since 2021. We can see him in the office for evaluation or he can see PCP.     >> needs OV

## 2024-06-08 DIAGNOSIS — I73.9 CLAUDICATION IN PERIPHERAL VASCULAR DISEASE (HCC): ICD-10-CM

## 2024-06-10 RX ORDER — CILOSTAZOL 100 MG/1
TABLET ORAL
Qty: 60 TABLET | Refills: 1 | Status: SHIPPED | OUTPATIENT
Start: 2024-06-10

## 2024-06-10 NOTE — TELEPHONE ENCOUNTER
Spoke with patient, advised refill provided for Pletal and advised he must schedule an office visit.

## 2024-06-10 NOTE — TELEPHONE ENCOUNTER
Please see documentation from 4/24/2024  Patient is overdue for office visit. (Last seen '21)  Please schedule.  Will send one-time refill.

## 2024-07-23 ENCOUNTER — APPOINTMENT (OUTPATIENT)
Dept: LAB | Facility: CLINIC | Age: 89
End: 2024-07-23
Payer: COMMERCIAL

## 2024-07-23 DIAGNOSIS — I10 ESSENTIAL HYPERTENSION, MALIGNANT: ICD-10-CM

## 2024-07-23 DIAGNOSIS — I25.119 ATHEROSCLEROSIS OF NATIVE CORONARY ARTERY WITH ANGINA PECTORIS, UNSPECIFIED WHETHER NATIVE OR TRANSPLANTED HEART (HCC): ICD-10-CM

## 2024-07-23 DIAGNOSIS — E78.5 HYPERLIPIDEMIA, UNSPECIFIED HYPERLIPIDEMIA TYPE: ICD-10-CM

## 2024-07-23 DIAGNOSIS — I35.0 AORTIC VALVE STENOSIS, ETIOLOGY OF CARDIAC VALVE DISEASE UNSPECIFIED: ICD-10-CM

## 2024-07-23 LAB
ALBUMIN SERPL BCG-MCNC: 4.1 G/DL (ref 3.5–5)
ALP SERPL-CCNC: 72 U/L (ref 34–104)
ALT SERPL W P-5'-P-CCNC: 17 U/L (ref 7–52)
ANION GAP SERPL CALCULATED.3IONS-SCNC: 10 MMOL/L (ref 4–13)
AST SERPL W P-5'-P-CCNC: 21 U/L (ref 13–39)
BILIRUB SERPL-MCNC: 0.6 MG/DL (ref 0.2–1)
BUN SERPL-MCNC: 10 MG/DL (ref 5–25)
CALCIUM SERPL-MCNC: 8.8 MG/DL (ref 8.4–10.2)
CHLORIDE SERPL-SCNC: 104 MMOL/L (ref 96–108)
CHOLEST SERPL-MCNC: 123 MG/DL
CO2 SERPL-SCNC: 22 MMOL/L (ref 21–32)
CREAT SERPL-MCNC: 1.04 MG/DL (ref 0.6–1.3)
GFR SERPL CREATININE-BSD FRML MDRD: 63 ML/MIN/1.73SQ M
GLUCOSE P FAST SERPL-MCNC: 143 MG/DL (ref 65–99)
HDLC SERPL-MCNC: 60 MG/DL
LDLC SERPL CALC-MCNC: 37 MG/DL (ref 0–100)
NONHDLC SERPL-MCNC: 63 MG/DL
POTASSIUM SERPL-SCNC: 3.9 MMOL/L (ref 3.5–5.3)
PROT SERPL-MCNC: 7.3 G/DL (ref 6.4–8.4)
SODIUM SERPL-SCNC: 136 MMOL/L (ref 135–147)
TRIGL SERPL-MCNC: 130 MG/DL

## 2024-07-23 PROCEDURE — 80061 LIPID PANEL: CPT

## 2024-07-23 PROCEDURE — 36415 COLL VENOUS BLD VENIPUNCTURE: CPT

## 2024-07-23 PROCEDURE — 80053 COMPREHEN METABOLIC PANEL: CPT

## 2025-01-20 ENCOUNTER — HOSPITAL ENCOUNTER (OUTPATIENT)
Dept: RADIOLOGY | Facility: HOSPITAL | Age: 89
Discharge: HOME | End: 2025-01-20
Payer: COMMERCIAL

## 2025-01-20 DIAGNOSIS — M51.26 DISPLACEMENT OF LUMBAR INTERVERTEBRAL DISC WITHOUT MYELOPATHY: ICD-10-CM

## 2025-01-20 PROCEDURE — 72110 X-RAY EXAM L-2 SPINE 4/>VWS: CPT

## 2025-01-21 ENCOUNTER — TRANSCRIBE ORDERS (OUTPATIENT)
Dept: LAB | Facility: CLINIC | Age: 89
End: 2025-01-21

## 2025-01-21 ENCOUNTER — APPOINTMENT (OUTPATIENT)
Dept: LAB | Facility: CLINIC | Age: 89
End: 2025-01-21
Payer: COMMERCIAL

## 2025-01-21 DIAGNOSIS — E78.49 OTHER HYPERLIPIDEMIA: Primary | ICD-10-CM

## 2025-01-21 DIAGNOSIS — E78.49 OTHER HYPERLIPIDEMIA: ICD-10-CM

## 2025-01-21 LAB
ALBUMIN SERPL BCG-MCNC: 4.4 G/DL (ref 3.5–5)
ALP SERPL-CCNC: 82 U/L (ref 34–104)
ALT SERPL W P-5'-P-CCNC: 20 U/L (ref 7–52)
ANION GAP SERPL CALCULATED.3IONS-SCNC: 10 MMOL/L (ref 4–13)
AST SERPL W P-5'-P-CCNC: 26 U/L (ref 13–39)
BACTERIA UR QL AUTO: ABNORMAL /HPF
BASOPHILS # BLD AUTO: 0.12 THOUSANDS/ΜL (ref 0–0.1)
BASOPHILS NFR BLD AUTO: 1 % (ref 0–1)
BILIRUB SERPL-MCNC: 0.76 MG/DL (ref 0.2–1)
BILIRUB UR QL STRIP: NEGATIVE
BUN SERPL-MCNC: 7 MG/DL (ref 5–25)
CALCIUM SERPL-MCNC: 9.3 MG/DL (ref 8.4–10.2)
CHLORIDE SERPL-SCNC: 102 MMOL/L (ref 96–108)
CHOLEST SERPL-MCNC: 117 MG/DL (ref ?–200)
CLARITY UR: ABNORMAL
CO2 SERPL-SCNC: 25 MMOL/L (ref 21–32)
COLOR UR: YELLOW
CREAT SERPL-MCNC: 1.04 MG/DL (ref 0.6–1.3)
EOSINOPHIL # BLD AUTO: 0.47 THOUSAND/ΜL (ref 0–0.61)
EOSINOPHIL NFR BLD AUTO: 4 % (ref 0–6)
ERYTHROCYTE [DISTWIDTH] IN BLOOD BY AUTOMATED COUNT: 14 % (ref 11.6–15.1)
GFR SERPL CREATININE-BSD FRML MDRD: 63 ML/MIN/1.73SQ M
GLUCOSE P FAST SERPL-MCNC: 128 MG/DL (ref 65–99)
GLUCOSE UR STRIP-MCNC: NEGATIVE MG/DL
HCT VFR BLD AUTO: 46.4 % (ref 36.5–49.3)
HDLC SERPL-MCNC: 57 MG/DL
HGB BLD-MCNC: 15.8 G/DL (ref 12–17)
HGB UR QL STRIP.AUTO: NEGATIVE
IMM GRANULOCYTES # BLD AUTO: 0.06 THOUSAND/UL (ref 0–0.2)
IMM GRANULOCYTES NFR BLD AUTO: 1 % (ref 0–2)
KETONES UR STRIP-MCNC: NEGATIVE MG/DL
LDLC SERPL CALC-MCNC: 39 MG/DL (ref 0–100)
LEUKOCYTE ESTERASE UR QL STRIP: NEGATIVE
LYMPHOCYTES # BLD AUTO: 3.16 THOUSANDS/ΜL (ref 0.6–4.47)
LYMPHOCYTES NFR BLD AUTO: 26 % (ref 14–44)
MCH RBC QN AUTO: 30.8 PG (ref 26.8–34.3)
MCHC RBC AUTO-ENTMCNC: 34.1 G/DL (ref 31.4–37.4)
MCV RBC AUTO: 90 FL (ref 82–98)
MONOCYTES # BLD AUTO: 1.07 THOUSAND/ΜL (ref 0.17–1.22)
MONOCYTES NFR BLD AUTO: 9 % (ref 4–12)
MUCOUS THREADS UR QL AUTO: ABNORMAL
NEUTROPHILS # BLD AUTO: 7.43 THOUSANDS/ΜL (ref 1.85–7.62)
NEUTS SEG NFR BLD AUTO: 59 % (ref 43–75)
NITRITE UR QL STRIP: NEGATIVE
NON-SQ EPI CELLS URNS QL MICRO: ABNORMAL /HPF
NONHDLC SERPL-MCNC: 60 MG/DL
NRBC BLD AUTO-RTO: 0 /100 WBCS
PH UR STRIP.AUTO: 6 [PH]
PLATELET # BLD AUTO: 268 THOUSANDS/UL (ref 149–390)
PMV BLD AUTO: 10.7 FL (ref 8.9–12.7)
POTASSIUM SERPL-SCNC: 4.1 MMOL/L (ref 3.5–5.3)
PROT SERPL-MCNC: 8.2 G/DL (ref 6.4–8.4)
PROT UR STRIP-MCNC: ABNORMAL MG/DL
RBC # BLD AUTO: 5.13 MILLION/UL (ref 3.88–5.62)
RBC #/AREA URNS AUTO: ABNORMAL /HPF
SODIUM SERPL-SCNC: 137 MMOL/L (ref 135–147)
SP GR UR STRIP.AUTO: 1.01 (ref 1–1.03)
TRIGL SERPL-MCNC: 106 MG/DL (ref ?–150)
URATE SERPL-MCNC: 5.7 MG/DL (ref 3.5–8.5)
UROBILINOGEN UR STRIP-ACNC: <2 MG/DL
WBC # BLD AUTO: 12.31 THOUSAND/UL (ref 4.31–10.16)
WBC #/AREA URNS AUTO: ABNORMAL /HPF

## 2025-01-21 PROCEDURE — 36415 COLL VENOUS BLD VENIPUNCTURE: CPT

## 2025-01-21 PROCEDURE — 80061 LIPID PANEL: CPT

## 2025-01-21 PROCEDURE — 80053 COMPREHEN METABOLIC PANEL: CPT

## 2025-01-21 PROCEDURE — 85025 COMPLETE CBC W/AUTO DIFF WBC: CPT

## 2025-01-21 PROCEDURE — 84550 ASSAY OF BLOOD/URIC ACID: CPT

## 2025-01-21 PROCEDURE — 81001 URINALYSIS AUTO W/SCOPE: CPT

## 2025-06-19 ENCOUNTER — TELEPHONE (OUTPATIENT)
Age: 89
End: 2025-06-19

## 2025-06-19 NOTE — TELEPHONE ENCOUNTER
Patient called stating he was contacted and was left a number to call back, after confirming the number stated, [facility]

## 2025-07-15 ENCOUNTER — HOSPITAL ENCOUNTER (OUTPATIENT)
Dept: NON INVASIVE DIAGNOSTICS | Facility: HOSPITAL | Age: 89
Discharge: HOME | End: 2025-07-15
Attending: INTERNAL MEDICINE
Payer: COMMERCIAL

## 2025-07-15 VITALS
DIASTOLIC BLOOD PRESSURE: 97 MMHG | BODY MASS INDEX: 32.65 KG/M2 | HEIGHT: 67 IN | SYSTOLIC BLOOD PRESSURE: 172 MMHG | WEIGHT: 208 LBS | HEART RATE: 84 BPM

## 2025-07-15 DIAGNOSIS — I35.0 NONRHEUMATIC AORTIC (VALVE) STENOSIS: ICD-10-CM

## 2025-07-15 LAB
AORTIC ROOT: 3.4 CM
AORTIC VALVE MEAN VELOCITY: 31.5 M/S
AV AREA BY CONTINUOUS VTI: 1 CM2
AV AREA PEAK VELOCITY: 1 CM2
AV LVOT MEAN GRADIENT: 3 MMHG
AV LVOT PEAK GRADIENT: 6 MMHG
AV MEAN PRESS GRAD SYS DOP V1V2: 42 MMHG
AV ORIFICE AREA US: 0.98 CM2
AV PEAK GRADIENT: 60 MMHG
AV VELOCITY RATIO: 0.31
AV VMAX SYS DOP: 3.87 M/S
BSA FOR ECHO PROCEDURE: 2.06 M2
DOP CALC AO VTI: 86.19 CM
DOP CALC LVOT AREA: 3.14 CM2
DOP CALC LVOT CARDIAC INDEX: 3.32 L/MIN/M2
DOP CALC LVOT CARDIAC OUTPUT: 6.83 L/MIN
DOP CALC LVOT DIAMETER: 2 CM
DOP CALC LVOT PEAK VEL VTI: 26.97 CM
DOP CALC LVOT PEAK VEL: 1.19 M/S
DOP CALC LVOT STROKE INDEX: 40.8 ML/M2
DOP CALC LVOT STROKE VOLUME: 84.69
E WAVE DECELERATION TIME: 249 MS
E/A RATIO: 0.6
FRACTIONAL SHORTENING: 23 (ref 28–44)
INTERVENTRICULAR SEPTUM IN DIASTOLE (PARASTERNAL SHORT AXIS VIEW): 1.3 CM
INTERVENTRICULAR SEPTUM: 1.3 CM (ref 0.6–1.1)
LAAS-AP2: 25.1 CM2
LAAS-AP4: 23.3 CM2
LEFT ATRIUM AREA SYSTOLE SINGLE PLANE A4C: 25.7 CM2
LEFT ATRIUM SIZE: 3.4 CM
LEFT ATRIUM VOLUME (MOD BIPLANE): 81 ML
LEFT ATRIUM VOLUME INDEX (MOD BIPLANE): 39.3 ML/M2
LEFT INTERNAL DIMENSION IN SYSTOLE: 3.6 CM (ref 2.1–4)
LEFT VENTRICULAR INTERNAL DIMENSION IN DIASTOLE: 4.7 CM (ref 3.5–6)
LEFT VENTRICULAR POSTERIOR WALL IN END DIASTOLE: 1.1 CM
LEFT VENTRICULAR STROKE VOLUME: 47 ML
LVSV (TEICH): 47 ML
MV E'TISSUE VEL-LAT: 6 CM/S
MV E'TISSUE VEL-SEP: 6 CM/S
MV PEAK A VEL: 1.29 M/S
MV PEAK E VEL: 78 CM/S
MV STENOSIS PRESSURE HALF TIME: 72 MS
MV VALVE AREA P 1/2 METHOD: 3.06
RA PRESSURE ESTIMATED: 8 MMHG
RIGHT ATRIUM AREA SYSTOLE A4C: 14.3 CM2
RIGHT VENTRICLE ID DIMENSION: 3.1 CM
RV PSP: 54 MMHG
SL CV LEFT ATRIUM LENGTH A2C: 5.8 CM
SL CV PED ECHO LEFT VENTRICLE DIASTOLIC VOLUME (MOD BIPLANE) 2D: 102 ML
SL CV PED ECHO LEFT VENTRICLE SYSTOLIC VOLUME (MOD BIPLANE) 2D: 55 ML
TR MAX PG: 46 MMHG
TR PEAK VELOCITY: 3.4 M/S
TRICUSPID ANNULAR PLANE SYSTOLIC EXCURSION: 2.5 CM
TRICUSPID VALVE PEAK REGURGITATION VELOCITY: 3.38 M/S

## 2025-07-15 PROCEDURE — 93306 TTE W/DOPPLER COMPLETE: CPT | Performed by: INTERNAL MEDICINE

## 2025-07-15 PROCEDURE — 93306 TTE W/DOPPLER COMPLETE: CPT

## (undated) DEVICE — DRAPE TOWEL: Brand: CONVERTORS

## (undated) DEVICE — ANTIBACTERIAL VIOLET BRAIDED (POLYGLACTIN 910), SYNTHETIC ABSORBABLE SUTURE: Brand: COATED VICRYL

## (undated) DEVICE — DRAPE INTESTINAL ISOLATION BAG

## (undated) DEVICE — TIBURON SPLIT SHEET: Brand: CONVERTORS

## (undated) DEVICE — TOOL 9MH30 LEGEND 9CM 3MM MH: Brand: MIDAS REX

## (undated) DEVICE — SUT VICRYL PLUS 3-0 RB-1 CR/8 18 IN VCP713D

## (undated) DEVICE — SUTURE BOOTS YELLOW

## (undated) DEVICE — PROXIMATE PLUS MD MULTI-DIRECTIONAL RELEASE SKIN STAPLERS CONTAINS 35 STAINLESS STEEL STAPLES APPROXIMATE CLOSED DIMENSIONS: 6.9MM X 3.9MM WIDE: Brand: PROXIMATE

## (undated) DEVICE — GLOVE SRG BIOGEL 7.5

## (undated) DEVICE — INTENDED FOR TISSUE SEPARATION, AND OTHER PROCEDURES THAT REQUIRE A SHARP SURGICAL BLADE TO PUNCTURE OR CUT.: Brand: BARD-PARKER ® CARBON RIB-BACK BLADES

## (undated) DEVICE — TELFA NON-ADHERENT ABSORBENT DRESSING: Brand: TELFA

## (undated) DEVICE — GLOVE INDICATOR PI UNDERGLOVE SZ 7.5 BLUE

## (undated) DEVICE — SPONGE PVP SCRUB WING STERILE

## (undated) DEVICE — INTENDED FOR TISSUE SEPARATION, AND OTHER PROCEDURES THAT REQUIRE A SHARP SURGICAL BLADE TO PUNCTURE OR CUT.: Brand: BARD-PARKER SAFETY BLADES SIZE 15, STERILE

## (undated) DEVICE — LIGHT HANDLE COVER SLEEVE DISP BLUE STELLAR

## (undated) DEVICE — TELFA ADHESIVE ISLAND DRESSING: Brand: TELFA

## (undated) DEVICE — PACK BURR HOLE PBDS RF

## (undated) DEVICE — 3M™ IOBAN™ 2 ANTIMICROBIAL INCISE DRAPE 6651EZ: Brand: IOBAN™ 2

## (undated) DEVICE — FLOSEAL MATRIX IS INDICATED IN SURGICAL PROCEDURES (OTHER THAN IN OPHTHALMIC) AS AN ADJUNCT TO HEMOSTASIS WHEN CONTROL OF BLEEDING BY LIGATURE OR CONVENTIONAL PROCEDURES IS INEFFECTIVE OR IMPRACTICAL.: Brand: FLOSEAL HEMOSTATIC MATRIX

## (undated) DEVICE — PREP SURGICAL PURPREP 26ML

## (undated) DEVICE — SUT MONOCRYL 4-0 PS-2 27 IN Y426H

## (undated) DEVICE — SUT SILK 2-0 18 IN A185H

## (undated) DEVICE — PASSER 48409 60CM DISP. CATHETER

## (undated) DEVICE — DRESSING MEPILEX AG BORDER 4 X 4 IN

## (undated) DEVICE — DISPOSABLE EQUIPMENT COVER: Brand: SMALL TOWEL DRAPE